# Patient Record
Sex: FEMALE | Race: ASIAN | NOT HISPANIC OR LATINO | Employment: OTHER | URBAN - METROPOLITAN AREA
[De-identification: names, ages, dates, MRNs, and addresses within clinical notes are randomized per-mention and may not be internally consistent; named-entity substitution may affect disease eponyms.]

---

## 2017-03-29 ENCOUNTER — OFFICE VISIT (OUTPATIENT)
Dept: PODIATRY | Facility: CLINIC | Age: 72
End: 2017-03-29
Payer: MEDICARE

## 2017-03-29 VITALS
HEIGHT: 62 IN | WEIGHT: 150 LBS | SYSTOLIC BLOOD PRESSURE: 125 MMHG | BODY MASS INDEX: 27.6 KG/M2 | HEART RATE: 71 BPM | DIASTOLIC BLOOD PRESSURE: 71 MMHG

## 2017-03-29 DIAGNOSIS — S90.821A BLISTER OF RIGHT FOOT WITHOUT INFECTION, INITIAL ENCOUNTER: Primary | ICD-10-CM

## 2017-03-29 DIAGNOSIS — M79.671 RIGHT FOOT PAIN: ICD-10-CM

## 2017-03-29 PROCEDURE — 1157F ADVNC CARE PLAN IN RCRD: CPT | Mod: S$GLB,,, | Performed by: PODIATRIST

## 2017-03-29 PROCEDURE — 1125F AMNT PAIN NOTED PAIN PRSNT: CPT | Mod: S$GLB,,, | Performed by: PODIATRIST

## 2017-03-29 PROCEDURE — 1160F RVW MEDS BY RX/DR IN RCRD: CPT | Mod: S$GLB,,, | Performed by: PODIATRIST

## 2017-03-29 PROCEDURE — 99999 PR PBB SHADOW E&M-EST. PATIENT-LVL III: CPT | Mod: PBBFAC,,, | Performed by: PODIATRIST

## 2017-03-29 PROCEDURE — 1159F MED LIST DOCD IN RCRD: CPT | Mod: S$GLB,,, | Performed by: PODIATRIST

## 2017-03-29 PROCEDURE — 99213 OFFICE O/P EST LOW 20 MIN: CPT | Mod: S$GLB,,, | Performed by: PODIATRIST

## 2017-03-29 RX ORDER — SILVER SULFADIAZINE 10 G/1000G
CREAM TOPICAL DAILY
Qty: 20 G | Refills: 1 | Status: SHIPPED | OUTPATIENT
Start: 2017-03-29 | End: 2019-09-20

## 2017-03-29 RX ORDER — MELOXICAM 15 MG/1
TABLET ORAL
Qty: 90 TABLET | Refills: 1 | Status: SHIPPED | OUTPATIENT
Start: 2017-03-29 | End: 2018-09-28

## 2017-03-29 RX ORDER — MELOXICAM 15 MG/1
15 TABLET ORAL DAILY PRN
Qty: 30 TABLET | Refills: 1 | Status: SHIPPED | OUTPATIENT
Start: 2017-03-29 | End: 2017-03-29 | Stop reason: SDUPTHER

## 2017-03-29 NOTE — PROGRESS NOTES
"  HPI:  Celestine Diallo is a 71 y.o. female who presents with complains of right hallux pain for at least two weeks, she had developed a blister in the area.  The blister opened last night, relieving some of her pain.  She has been treating the area by using heat from an incense for pain relief, as she had done on the left hallux in the past.  She is also applying silvadene to the area and requests a prescription refill.      History of ganglion cyst excision left hallux DOS:   12-   No new changes in shoes or activities as reported by the patient.         No past medical history on file.       Current Outpatient Prescriptions on File Prior to Visit   Medication Sig Dispense Refill    diclofenac sodium (VOLTAREN) 1 % Gel Apply 2 g topically 4 (four) times daily as needed. 100 g 1    FLUZONE HIGH-DOSE 2014-15, PF, 180 mcg/0.5 mL Syrg       hydrocodone-acetaminophen 5-325mg (NORCO) 5-325 mg per tablet Take 1 tablet by mouth every 6 (six) hours as needed for Pain. 20 tablet 0    levothyroxine (SYNTHROID) 25 MCG tablet Take 25 mcg by mouth once daily.       No current facility-administered medications on file prior to visit.          Review of patient's allergies indicates:  No Known Allergies        O:  Vitals:    03/29/17 0721   BP: 125/71   Pulse: 71   Weight: 68 kg (150 lb)   Height: 5' 2" (1.575 m)      Neurovascular status intact.   No gross foot deformity  right hallux epidermal wound at the lateral aspect with raw granular base, some clear exudate but no open wounds, approx 2cm x 3cm in area extending in to the plantar sulcus.       A:  1. Blister of right foot without infection, initial encounter  silver sulfADIAZINE 1% (SILVADENE) 1 % cream   2. Right foot pain          P:  I counseled the patient on her conditions, their implications and medical management.   Blister irrigated with sterile normal saline.  Topical antibiotics applied.  The toe was wrapped with gauze.  Patient will clean and change " daily.   Monitor for worsening signs and symptoms.    Patient states that she will call for a follow up if she needs it.

## 2018-09-28 ENCOUNTER — HOSPITAL ENCOUNTER (OUTPATIENT)
Dept: RADIOLOGY | Facility: HOSPITAL | Age: 73
Discharge: HOME | End: 2018-09-28
Attending: PHYSICIAN ASSISTANT
Payer: MEDICARE

## 2018-09-28 ENCOUNTER — OFFICE VISIT (OUTPATIENT)
Dept: ORTHOPEDICS | Facility: CLINIC | Age: 73
End: 2018-09-28
Payer: MEDICARE

## 2018-09-28 VITALS
SYSTOLIC BLOOD PRESSURE: 128 MMHG | DIASTOLIC BLOOD PRESSURE: 77 MMHG | WEIGHT: 149.94 LBS | HEART RATE: 69 BPM | BODY MASS INDEX: 27.59 KG/M2 | HEIGHT: 62 IN

## 2018-09-28 DIAGNOSIS — M25.561 PAIN IN BOTH KNEES, UNSPECIFIED CHRONICITY: ICD-10-CM

## 2018-09-28 DIAGNOSIS — M17.0 PRIMARY OSTEOARTHRITIS OF BOTH KNEES: Primary | ICD-10-CM

## 2018-09-28 DIAGNOSIS — M25.561 PAIN IN BOTH KNEES, UNSPECIFIED CHRONICITY: Primary | ICD-10-CM

## 2018-09-28 DIAGNOSIS — M25.562 PAIN IN BOTH KNEES, UNSPECIFIED CHRONICITY: ICD-10-CM

## 2018-09-28 DIAGNOSIS — M25.562 PAIN IN BOTH KNEES, UNSPECIFIED CHRONICITY: Primary | ICD-10-CM

## 2018-09-28 PROCEDURE — 99203 OFFICE O/P NEW LOW 30 MIN: CPT | Mod: 25,S$PBB,, | Performed by: PHYSICIAN ASSISTANT

## 2018-09-28 PROCEDURE — 99213 OFFICE O/P EST LOW 20 MIN: CPT | Mod: PBBFAC,25 | Performed by: PHYSICIAN ASSISTANT

## 2018-09-28 PROCEDURE — 20610 DRAIN/INJ JOINT/BURSA W/O US: CPT | Mod: PBBFAC | Performed by: PHYSICIAN ASSISTANT

## 2018-09-28 PROCEDURE — 99999 PR PBB SHADOW E&M-EST. PATIENT-LVL III: CPT | Mod: PBBFAC,,, | Performed by: PHYSICIAN ASSISTANT

## 2018-09-28 PROCEDURE — 20610 DRAIN/INJ JOINT/BURSA W/O US: CPT | Mod: S$PBB,50,, | Performed by: PHYSICIAN ASSISTANT

## 2018-09-28 PROCEDURE — 1101F PT FALLS ASSESS-DOCD LE1/YR: CPT | Mod: CPTII,,, | Performed by: PHYSICIAN ASSISTANT

## 2018-09-28 PROCEDURE — 73564 X-RAY EXAM KNEE 4 OR MORE: CPT | Mod: 26,50,, | Performed by: RADIOLOGY

## 2018-09-28 PROCEDURE — 73564 X-RAY EXAM KNEE 4 OR MORE: CPT | Mod: TC,50

## 2018-09-28 RX ORDER — TRIAMCINOLONE ACETONIDE 40 MG/ML
80 INJECTION, SUSPENSION INTRA-ARTICULAR; INTRAMUSCULAR
Status: COMPLETED | OUTPATIENT
Start: 2018-09-28 | End: 2018-09-28

## 2018-09-28 RX ORDER — DICLOFENAC SODIUM 10 MG/G
2 GEL TOPICAL 2 TIMES DAILY
Qty: 100 G | Refills: 1 | Status: ON HOLD | OUTPATIENT
Start: 2018-09-28 | End: 2019-09-30 | Stop reason: HOSPADM

## 2018-09-28 RX ADMIN — TRIAMCINOLONE ACETONIDE 80 MG: 40 INJECTION, SUSPENSION INTRA-ARTICULAR; INTRAMUSCULAR at 09:09

## 2018-09-28 NOTE — PROGRESS NOTES
"  SUBJECTIVE:     Chief Complaint : bilateral knee pain    History of Present Illness:  Celestine Diallo is a 72 y.o. female seen in clinic today with a chief complaint of chronic atraumatic bilateral knee pain. Pain started about 10 years ago but has increased significantly over the past month. No change in activity. Pt has fallen several times in her home because of her knees. She has stiffness after sitting. She has difficulty getting out of a chair. Left knee is worse than right. She has lateral pain. Denies swelling. Has chronic pain in feet and is treated by podiatry. She had knee injections 7 years ago in [place] that helped.  She takes glucosamine for her knees and uses topical treatment but does not take any pills. She does stretching and exercises in bed each day before getting up. This makes her feel better.    PMH:   S/p thyroidectomy    Review of Systems:  Constitutional: no fever or chills  ENT: no nasal congestion or sore throat  Respiratory: no cough or shortness of breath  Cardiovascular: no chest pain or palpitations  Gastrointestinal: no nausea or vomiting, tolerating diet  Genitourinary: no hematuria or dysuria  Integument/Breast: no rash or pruritis  Hematologic/Lymphatic: no easy bruising or lymphadenopathy  Musculoskeletal: see HPI  Neurological: no seizures or tremors  Behavioral/Psych: no auditory or visual hallucinations    OBJECTIVE:     PHYSICAL EXAM:  Blood pressure 128/77, pulse 69, height 5' 2" (1.575 m), weight 68 kg (149 lb 14.6 oz).   General Appearance: WDWN, NAD  Gait: antalgic  Neuro/Psych: Mood & affect appropriate  Lungs: Respirations equal and unlabored.   CV: 2+ bilateral upper and lower extremity pulses.   Skin: Intact throughout LE  Extremities: No LE edema    Right Knee Exam  Range of Motion:0-120 active   Effusion:none  Condition of skin:intact  Location of tenderness:Lateral joint line   Strength:4 of 5 quadriceps strength and 4 of 5 hamstring strength  Stability:stable to " testing  Brandin: negative/negative    Left Knee Exam  Range of Motion:5-115 active   Effusion:none  Condition of skin:intact  Location of tenderness:Lateral joint line   Strength:4 of 5 quadriceps strength and 4 of 5 hamstring strength  Stability:stable to testing  Brandin: negative/negative    Alignment: Moderate valgus    Right Hip Examination: no pain with PROM     Left Hip Examination: no pain with PROM     RADIOGRAPHS: AP, lateral and merchant knee x-rays ordered and images reviewed today by me reveal advanced degenerative changes of both knees. Complete loss of joint space in lateral compartments that is worse in flexion.    ASSESSMENT/PLAN:   Primary osteoarthritis of both knees  - Continue exercises, glucosamine  - Refilled topical diclofenac  - Encouraged use of cane to prevent falls   - Discussed that TKA may be the only way to get rid of pain. She does not want surgery.  - Pt would like to have bilateral CSI today.   - Gave Euflexxa brochure. She will call if she would like to try.  - F/u prn.    Knee Injection Procedure Note  Diagnosis: bilateral knee degenerative arthritis  Indications: bilateral knee pain  Procedure Details: Verbal consent was obtained for the procedure. The injection site was identified and the skin was prepared with alcohol. The bilateral knee was injected from an anterolateral approach with 1 ml of Kenalog and 2.5 ml Lidocaine under sterile technique using a 22 gauge needle. The needle was removed and the area cleansed and dressed.  Complications:  Patient tolerated the procedure well.    she was advised to rest the knee today, using ice and elevation as needed for comfort and swelling.Immediate relief of the knee pain may be short lived and secondary to the lidocaine. she may have an increase in discomfort tonight followed by steady improvement over the next several days. It may take 1-2 weeks following the injection to get the full benefit of the medication.

## 2019-07-02 ENCOUNTER — OFFICE VISIT (OUTPATIENT)
Dept: ORTHOPEDICS | Facility: CLINIC | Age: 74
End: 2019-07-02
Payer: MEDICARE

## 2019-07-02 ENCOUNTER — HOSPITAL ENCOUNTER (OUTPATIENT)
Dept: RADIOLOGY | Facility: HOSPITAL | Age: 74
Discharge: HOME | End: 2019-07-02
Attending: PHYSICIAN ASSISTANT
Payer: MEDICARE

## 2019-07-02 VITALS — HEIGHT: 62 IN | WEIGHT: 149.94 LBS | BODY MASS INDEX: 27.59 KG/M2

## 2019-07-02 DIAGNOSIS — M25.562 PAIN IN BOTH KNEES, UNSPECIFIED CHRONICITY: Primary | ICD-10-CM

## 2019-07-02 DIAGNOSIS — M25.561 PAIN IN BOTH KNEES, UNSPECIFIED CHRONICITY: Primary | ICD-10-CM

## 2019-07-02 DIAGNOSIS — M17.0 PRIMARY OSTEOARTHRITIS OF BOTH KNEES: Primary | ICD-10-CM

## 2019-07-02 DIAGNOSIS — M25.561 PAIN IN BOTH KNEES, UNSPECIFIED CHRONICITY: ICD-10-CM

## 2019-07-02 DIAGNOSIS — M25.562 PAIN IN BOTH KNEES, UNSPECIFIED CHRONICITY: ICD-10-CM

## 2019-07-02 PROCEDURE — 99999 PR PBB SHADOW E&M-EST. PATIENT-LVL III: ICD-10-PCS | Mod: PBBFAC,,, | Performed by: PHYSICIAN ASSISTANT

## 2019-07-02 PROCEDURE — 99499 UNLISTED E&M SERVICE: CPT | Mod: S$GLB,,, | Performed by: PHYSICIAN ASSISTANT

## 2019-07-02 PROCEDURE — 20610 PR DRAIN/INJECT LARGE JOINT/BURSA: ICD-10-PCS | Mod: 50,S$GLB,, | Performed by: PHYSICIAN ASSISTANT

## 2019-07-02 PROCEDURE — 20610 DRAIN/INJ JOINT/BURSA W/O US: CPT | Mod: 50,S$GLB,, | Performed by: PHYSICIAN ASSISTANT

## 2019-07-02 PROCEDURE — 73564 X-RAY EXAM KNEE 4 OR MORE: CPT | Mod: TC,50

## 2019-07-02 PROCEDURE — 1101F PR PT FALLS ASSESS DOC 0-1 FALLS W/OUT INJ PAST YR: ICD-10-PCS | Mod: CPTII,S$GLB,, | Performed by: PHYSICIAN ASSISTANT

## 2019-07-02 PROCEDURE — 99999 PR PBB SHADOW E&M-EST. PATIENT-LVL III: CPT | Mod: PBBFAC,,, | Performed by: PHYSICIAN ASSISTANT

## 2019-07-02 PROCEDURE — 1101F PT FALLS ASSESS-DOCD LE1/YR: CPT | Mod: CPTII,S$GLB,, | Performed by: PHYSICIAN ASSISTANT

## 2019-07-02 PROCEDURE — 73564 X-RAY EXAM KNEE 4 OR MORE: CPT | Mod: 26,50,, | Performed by: RADIOLOGY

## 2019-07-02 PROCEDURE — 99499 NO LOS: ICD-10-PCS | Mod: S$GLB,,, | Performed by: PHYSICIAN ASSISTANT

## 2019-07-02 PROCEDURE — 73564 XR KNEE ORTHO BILAT WITH FLEXION: ICD-10-PCS | Mod: 26,50,, | Performed by: RADIOLOGY

## 2019-07-02 RX ORDER — ALENDRONATE SODIUM 70 MG/1
TABLET ORAL
Refills: 3 | COMMUNITY
Start: 2019-04-12 | End: 2019-09-20

## 2019-07-02 NOTE — PROGRESS NOTES
"I spoke to the patient's daughter Esa on the phone today during the visit and answered all questions.     SUBJECTIVE:     Chief Complaint : bilateral knee pain    History of Present Illness:  Celestine Diallo is a 73 y.o. female seen in clinic today with a chief complaint of chronic atraumatic bilateral knee pain. Pain started about 10 years ago but has increased significantly over the past year. No change in activity. Pt has fallen several times in her home because of her knees. She has stiffness after sitting. She has difficulty getting out of a chair. Left knee is worse than right. She has lateral pain. Denies swelling. Has chronic pain in feet and is treated by podiatry. She had bilateral knee injections in September of 2018 by me that relieved pain for 2 months.  She takes glucosamine for her knees and uses topical treatment but does not take any pills. She does stretching and exercises in bed each day before getting up. This makes her feel better.      PMH:   S/p thyroidectomy    Review of Systems:  Constitutional: no fever or chills  ENT: no nasal congestion or sore throat  Respiratory: no cough or shortness of breath  Cardiovascular: no chest pain or palpitations  Gastrointestinal: no nausea or vomiting, tolerating diet  Genitourinary: no hematuria or dysuria  Integument/Breast: no rash or pruritis  Hematologic/Lymphatic: no easy bruising or lymphadenopathy  Musculoskeletal: see HPI  Neurological: no seizures or tremors  Behavioral/Psych: no auditory or visual hallucinations    OBJECTIVE:     PHYSICAL EXAM:  Height 5' 2" (1.575 m), weight 68 kg (149 lb 14.6 oz).   General Appearance: WDWN, NAD  Gait: antalgic  Neuro/Psych: Mood & affect appropriate  Lungs: Respirations equal and unlabored.   CV: 2+ bilateral upper and lower extremity pulses.   Skin: Intact throughout LE  Extremities: No LE edema    Right Knee Exam  Range of Motion:0-120 active   Effusion:none  Condition of skin:intact  Location of " tenderness:Lateral joint line   Strength:4 of 5 quadriceps strength and 4 of 5 hamstring strength  Stability:stable to testing  Brandin: negative/negative    Left Knee Exam  Range of Motion:5-115 active   Effusion:none  Condition of skin:intact  Location of tenderness:Lateral joint line   Strength:4 of 5 quadriceps strength and 4 of 5 hamstring strength  Stability:stable to testing  Brandin: negative/negative    Alignment: Moderate valgus    Right Hip Examination: no pain with PROM     Left Hip Examination: no pain with PROM     RADIOGRAPHS: AP, lateral and merchant knee x-rays ordered and images reviewed today by me reveal advanced degenerative changes of both knees. Complete loss of joint space in lateral compartments that is worse in flexion.    ASSESSMENT/PLAN:   Primary osteoarthritis of both knees  - Continue exercises, glucosamine, topical NSAID  - Encouraged use of cane to prevent falls. Pt declined.  - Discussed that TKA may be the only way to get rid of pain. She is now willing to discuss surgical options but would like to try Synvisc first.   - Bilateral Synvisc injections given today. She will f/u next week for second injection.    Procedure Note:   Exam demonstrates the no effusion in the bilateral knee, and the skin is intact.  Diagnosis: osteoarthritis knee  After time out was performed and patient ID, side, and site were verified, the right knee and the left knee were sterilly prepped in the standard fashion.  A 22-gauge needle was introduced into the right knee and the left knee joint from an jolanta-lateral site without complication. The right knee and the left knee were then injected with 2 ml of Synvisc each. Sterile dressing was applied.  The patient was instructed to resume activities as tolerated and to call with any problems.

## 2019-07-09 ENCOUNTER — OFFICE VISIT (OUTPATIENT)
Dept: ORTHOPEDICS | Facility: CLINIC | Age: 74
End: 2019-07-09
Payer: MEDICARE

## 2019-07-09 VITALS
DIASTOLIC BLOOD PRESSURE: 64 MMHG | HEIGHT: 62 IN | SYSTOLIC BLOOD PRESSURE: 116 MMHG | WEIGHT: 149.94 LBS | TEMPERATURE: 98 F | BODY MASS INDEX: 27.59 KG/M2 | HEART RATE: 68 BPM

## 2019-07-09 DIAGNOSIS — M17.0 PRIMARY OSTEOARTHRITIS OF BOTH KNEES: Primary | ICD-10-CM

## 2019-07-09 PROCEDURE — 99499 NO LOS: ICD-10-PCS | Mod: S$GLB,,, | Performed by: PHYSICIAN ASSISTANT

## 2019-07-09 PROCEDURE — 20610 PR DRAIN/INJECT LARGE JOINT/BURSA: ICD-10-PCS | Mod: 50,S$GLB,, | Performed by: PHYSICIAN ASSISTANT

## 2019-07-09 PROCEDURE — 99999 PR PBB SHADOW E&M-EST. PATIENT-LVL III: ICD-10-PCS | Mod: PBBFAC,,, | Performed by: PHYSICIAN ASSISTANT

## 2019-07-09 PROCEDURE — 20610 DRAIN/INJ JOINT/BURSA W/O US: CPT | Mod: 50,S$GLB,, | Performed by: PHYSICIAN ASSISTANT

## 2019-07-09 PROCEDURE — 99499 UNLISTED E&M SERVICE: CPT | Mod: S$GLB,,, | Performed by: PHYSICIAN ASSISTANT

## 2019-07-09 PROCEDURE — 99999 PR PBB SHADOW E&M-EST. PATIENT-LVL III: CPT | Mod: PBBFAC,,, | Performed by: PHYSICIAN ASSISTANT

## 2019-07-10 NOTE — PROGRESS NOTES
Celestine Diallo presents to clinic today for the second bilateral knee Synvisc injection.    Exam demonstrates the no effusion in the bilateral knee, and the skin is intact.    Diagnosis: osteoarthritis knee    After time out was performed and patient ID, side, and site were verified, the right knee and the left knee were sterilly prepped in the standard fashion.  A 22-gauge needle was introduced into the right knee and the left knee joint from an jolanta-lateral site without complication. The right knee and the left knee were then injected with 2 ml of Synvisc each. Sterile dressing was applied.  The patient was instructed to resume activities as tolerated and to call with any problems.     We will see Celestine Diallo back next week for the third injection.

## 2019-07-16 ENCOUNTER — OFFICE VISIT (OUTPATIENT)
Dept: ORTHOPEDICS | Facility: CLINIC | Age: 74
End: 2019-07-16
Payer: MEDICARE

## 2019-07-16 VITALS
WEIGHT: 149.94 LBS | DIASTOLIC BLOOD PRESSURE: 70 MMHG | BODY MASS INDEX: 27.59 KG/M2 | HEIGHT: 62 IN | SYSTOLIC BLOOD PRESSURE: 117 MMHG | HEART RATE: 64 BPM

## 2019-07-16 DIAGNOSIS — M17.0 PRIMARY OSTEOARTHRITIS OF BOTH KNEES: Primary | ICD-10-CM

## 2019-07-16 PROCEDURE — 99499 UNLISTED E&M SERVICE: CPT | Mod: S$GLB,,, | Performed by: PHYSICIAN ASSISTANT

## 2019-07-16 PROCEDURE — 99999 PR PBB SHADOW E&M-EST. PATIENT-LVL III: ICD-10-PCS | Mod: PBBFAC,,, | Performed by: PHYSICIAN ASSISTANT

## 2019-07-16 PROCEDURE — 20610 PR DRAIN/INJECT LARGE JOINT/BURSA: ICD-10-PCS | Mod: 50,S$GLB,, | Performed by: PHYSICIAN ASSISTANT

## 2019-07-16 PROCEDURE — 20610 DRAIN/INJ JOINT/BURSA W/O US: CPT | Mod: 50,S$GLB,, | Performed by: PHYSICIAN ASSISTANT

## 2019-07-16 PROCEDURE — 99499 NO LOS: ICD-10-PCS | Mod: S$GLB,,, | Performed by: PHYSICIAN ASSISTANT

## 2019-07-16 PROCEDURE — 99999 PR PBB SHADOW E&M-EST. PATIENT-LVL III: CPT | Mod: PBBFAC,,, | Performed by: PHYSICIAN ASSISTANT

## 2019-07-16 NOTE — PROGRESS NOTES
Celestine Diallo presents to clinic today for the third bilateral knee Synvisc injection.    Exam demonstrates the no effusion in the bilateral knee, and the skin is intact.    Diagnosis: osteoarthritis knee    After time out was performed and patient ID, side, and site were verified, the right knee and the left knee were sterilly prepped in the standard fashion.  A 22-gauge needle was introduced into the right knee and the left knee joint from an jolanta-lateral site without complication. The right knee and the left knee were then injected with 2 ml of Synvisc each. Sterile dressing was applied.  The patient was instructed to resume activities as tolerated and to call with any problems.     F/u prn.

## 2019-08-30 ENCOUNTER — TELEPHONE (OUTPATIENT)
Dept: ORTHOPEDICS | Facility: CLINIC | Age: 74
End: 2019-08-30

## 2019-08-30 ENCOUNTER — OFFICE VISIT (OUTPATIENT)
Dept: ORTHOPEDICS | Facility: CLINIC | Age: 74
End: 2019-08-30
Payer: MEDICARE

## 2019-08-30 VITALS — BODY MASS INDEX: 25.98 KG/M2 | WEIGHT: 141.19 LBS | HEIGHT: 62 IN

## 2019-08-30 DIAGNOSIS — M17.0 PRIMARY OSTEOARTHRITIS OF BOTH KNEES: Primary | ICD-10-CM

## 2019-08-30 PROCEDURE — 99999 PR PBB SHADOW E&M-EST. PATIENT-LVL III: ICD-10-PCS | Mod: PBBFAC,,, | Performed by: PHYSICIAN ASSISTANT

## 2019-08-30 PROCEDURE — 1101F PT FALLS ASSESS-DOCD LE1/YR: CPT | Mod: CPTII,S$GLB,, | Performed by: PHYSICIAN ASSISTANT

## 2019-08-30 PROCEDURE — 99212 OFFICE O/P EST SF 10 MIN: CPT | Mod: S$GLB,,, | Performed by: PHYSICIAN ASSISTANT

## 2019-08-30 PROCEDURE — 1101F PR PT FALLS ASSESS DOC 0-1 FALLS W/OUT INJ PAST YR: ICD-10-PCS | Mod: CPTII,S$GLB,, | Performed by: PHYSICIAN ASSISTANT

## 2019-08-30 PROCEDURE — 99999 PR PBB SHADOW E&M-EST. PATIENT-LVL III: CPT | Mod: PBBFAC,,, | Performed by: PHYSICIAN ASSISTANT

## 2019-08-30 PROCEDURE — 99212 PR OFFICE/OUTPT VISIT, EST, LEVL II, 10-19 MIN: ICD-10-PCS | Mod: S$GLB,,, | Performed by: PHYSICIAN ASSISTANT

## 2019-08-30 RX ORDER — NAPROXEN SODIUM 220 MG
1 CAPSULE ORAL
COMMUNITY
End: 2022-09-02

## 2019-08-30 NOTE — TELEPHONE ENCOUNTER
Pre Ms Theresa OZUNA I called Erasmo  Son of Mrs Diallo, with a Dr [facility] appt for Sept. 12 @ 9:40 am   He took it    appt slip sent

## 2019-08-30 NOTE — PROGRESS NOTES
"  SUBJECTIVE:     Chief Complaint : bilateral knee pain    History of Present Illness:  Celestine Diallo is a 73 y.o. female seen in clinic today with a chief complaint of chronic atraumatic bilateral knee pain. Pain started about 10 years ago but has increased significantly over the past several months. No change in activity. Pt has fallen several times in her home because of her knees, but is using a cane and has not fallen recently.  She has stiffness after sitting. She has difficulty getting out of a chair. Right knee is worse than left. She has lateral pain. Denies swelling. Has chronic pain in feet and is treated by podiatry. She has tried injections. I gave her bilateral CSI 9/28/2019 that helped minimally. She also completed bilateral Synvisc injections 7/2019 that also helped slightly. She takes glucosamine for her knees and uses topical treatment but does not take any oral pain medications. She does stretching and exercises in bed each day before getting up. This makes her feel better.  Her knee pain is starting to affect her mental health and she is unable to remain independent and perform ADL. She is ready to discuss surgery. She moved from [facility] to the  in [place] and has three children here that are very helpful.     PMH:   S/p thyroidectomy    Review of Systems:  Constitutional: no fever or chills  ENT: no nasal congestion or sore throat  Respiratory: no cough or shortness of breath  Cardiovascular: no chest pain or palpitations  Gastrointestinal: no nausea or vomiting, tolerating diet  Genitourinary: no hematuria or dysuria  Integument/Breast: no rash or pruritis  Hematologic/Lymphatic: no easy bruising or lymphadenopathy  Musculoskeletal: see HPI  Neurological: no seizures or tremors  Behavioral/Psych: no auditory or visual hallucinations    OBJECTIVE:     PHYSICAL EXAM:  Height 5' 2" (1.575 m), weight 64 kg (141 lb 3.3 oz).   General Appearance: WDWN, NAD  Gait: antalgic  Neuro/Psych: Mood & affect " appropriate  Lungs: Respirations equal and unlabored.   CV: 2+ bilateral upper and lower extremity pulses.   Skin: Intact throughout LE  Extremities: No LE edema    Right Knee Exam  Range of Motion:0-120 active   Effusion:none  Condition of skin:intact  Location of tenderness:Lateral joint line   Strength:4 of 5 quadriceps strength and 4 of 5 hamstring strength  Stability:stable to testing  Brandin: negative/negative    Left Knee Exam  Range of Motion:5-115 active   Effusion:none  Condition of skin:intact  Location of tenderness:Lateral joint line   Strength:4 of 5 quadriceps strength and 4 of 5 hamstring strength  Stability:stable to testing  Brandin: negative/negative    Alignment: Moderate valgus    Right Hip Examination: no pain with PROM     Left Hip Examination: no pain with PROM     RADIOGRAPHS: AP, lateral and merchant knee x-rays reviewed today by me reveal advanced degenerative changes of both knees. Complete loss of joint space in lateral compartments that is worse in flexion.    ASSESSMENT/PLAN:   Primary osteoarthritis of both knees  - Continue stretching  - Encouraged use of cane to prevent falls   - Discussed TKA at length with patient and her son Erasmo. She has decided to proceed and would like to do so ASAP. Will discuss with surgeons and get her an appt. If she had to choose one knee at this point it would be her right.

## 2019-09-12 ENCOUNTER — HOSPITAL ENCOUNTER (OUTPATIENT)
Dept: RADIOLOGY | Facility: HOSPITAL | Age: 74
Discharge: HOME | End: 2019-09-12
Attending: ORTHOPAEDIC SURGERY
Payer: MEDICARE

## 2019-09-12 ENCOUNTER — OFFICE VISIT (OUTPATIENT)
Dept: ORTHOPEDICS | Facility: CLINIC | Age: 74
End: 2019-09-12
Payer: MEDICARE

## 2019-09-12 VITALS — BODY MASS INDEX: 25.98 KG/M2 | WEIGHT: 141.19 LBS | HEIGHT: 62 IN

## 2019-09-12 DIAGNOSIS — M17.9 OSTEOARTHRITIS OF KNEE, UNSPECIFIED (CODE): Primary | ICD-10-CM

## 2019-09-12 DIAGNOSIS — M17.9 OSTEOARTHRITIS OF KNEE, UNSPECIFIED (CODE): ICD-10-CM

## 2019-09-12 PROCEDURE — 99214 OFFICE O/P EST MOD 30 MIN: CPT | Mod: S$GLB,,, | Performed by: ORTHOPAEDIC SURGERY

## 2019-09-12 PROCEDURE — 73560 X-RAY EXAM OF KNEE 1 OR 2: CPT | Mod: TC,RT

## 2019-09-12 PROCEDURE — 1101F PR PT FALLS ASSESS DOC 0-1 FALLS W/OUT INJ PAST YR: ICD-10-PCS | Mod: CPTII,S$GLB,, | Performed by: ORTHOPAEDIC SURGERY

## 2019-09-12 PROCEDURE — 73560 XR KNEE 1 OR 2 VIEW RIGHT: ICD-10-PCS | Mod: 26,RT,, | Performed by: RADIOLOGY

## 2019-09-12 PROCEDURE — 73560 X-RAY EXAM OF KNEE 1 OR 2: CPT | Mod: 26,RT,, | Performed by: RADIOLOGY

## 2019-09-12 PROCEDURE — 1101F PT FALLS ASSESS-DOCD LE1/YR: CPT | Mod: CPTII,S$GLB,, | Performed by: ORTHOPAEDIC SURGERY

## 2019-09-12 PROCEDURE — 99999 PR PBB SHADOW E&M-EST. PATIENT-LVL III: ICD-10-PCS | Mod: PBBFAC,,, | Performed by: ORTHOPAEDIC SURGERY

## 2019-09-12 PROCEDURE — 99214 PR OFFICE/OUTPT VISIT, EST, LEVL IV, 30-39 MIN: ICD-10-PCS | Mod: S$GLB,,, | Performed by: ORTHOPAEDIC SURGERY

## 2019-09-12 PROCEDURE — 99999 PR PBB SHADOW E&M-EST. PATIENT-LVL III: CPT | Mod: PBBFAC,,, | Performed by: ORTHOPAEDIC SURGERY

## 2019-09-12 NOTE — LETTER
September 12, 2019      Carolyn Cardenas PA-C  [address]  [facility] [address]           [facility]oped[facility]  [address]  [facility] [address]  Phone: [phone]          Patient: Celestine Diallo   MR Number: [id number]   YOB: 1945   Date of Visit: 9/12/2019       Dear Carolyn Cardenas:    Thank you for referring Celestine Diallo to me for evaluation. Attached you will find relevant portions of my assessment and plan of care.    If you have questions, please do not hesitate to call me. I look forward to following Celestine Diallo along with you.    Sincerely,    [facility] MD    Enclosure  CC:  No Recipients    If you would like to receive this communication electronically, please contact [email or web address] or [phone] to request more information on [employer] access.    For providers and/or their staff who would like to refer a patient to [facility], please contact us through our one-stop-shop provider referral line, [facility], at [phone].    If you feel you have received this communication in error or would no longer like to receive these types of communications, please e-mail [email or web address]

## 2019-09-12 NOTE — PROGRESS NOTES
HPI:    Celestine Diallo is a 73 y.o. female who is here today for evaluation of bilateral knee pain, right knee pain worse than the left. She reportedly has had pain in her right knee for about 10 years ago, it has dramatically worsened over the last few months. She had bilateral steroid injections recently, July 16. She has tried everything from ibuprofen to ice without improvement. The injections only brought relief for a few days. Patient is healthy, no previous medical problems.   Chief Complaint   Patient presents with    Left Knee - Pain    Right Knee - Pain   .     Duration: 10 years  Intensity: moderate  Character of pain: sharp  Location: She reports that the pain is predominately  lateral  Patient's pain increases with activity.  Pain is increased with weightbearing and interferes with activities of daily living.    She has tried conservative management including NSAIDS, injections, and activity modification without relief.    She has discussed options with her family and wishes to schedule TKA.     PMSFSH reviewed per clinic record       History reviewed. No pertinent past medical history.       Current Outpatient Medications:     alendronate (FOSAMAX) 70 MG tablet, , Disp: , Rfl: 3    multivitamin with minerals tablet, Take 1 tablet by mouth once daily., Disp: , Rfl:     diclofenac sodium (VOLTAREN) 1 % Gel, Apply 2 g topically 2 (two) times daily., Disp: 100 g, Rfl: 1    FLUZONE HIGH-DOSE 2014-15, PF, 180 mcg/0.5 mL Syrg, , Disp: , Rfl:     naproxen sodium (ALEVE) 220 mg Cap, Take 1 tablet by mouth., Disp: , Rfl:     silver sulfADIAZINE 1% (SILVADENE) 1 % cream, Apply topically once daily., Disp: 20 g, Rfl: 1     Review of patient's allergies indicates:  No Known Allergies     ROS  Constitutional: Negative for fever, Negative for weight loss  HENT Negative for congestion  Cardiovascular: Negative chest pain  Respiratory: Negative Shortness of breath  Heme: Negative excessive  "bleeding  Skin:NegativeItching, Negative breakdown  Musculoskeletal:Negative for back pain, Positive for joint pain, Negative muscle pain, Negative muscle weakness  Neurological: Negative for numbness and paresthesias   Psychiatric/Behavioral: Negative altered mental status, Negative for depression    Physical Exam:   Ht 5' 2" (1.575 m)   Wt 64 kg (141 lb 3.3 oz)   BMI 25.83 kg/m²   General appearance: This is a well-developed, Well nourished female No obvious acute distress.  Psychiatric: normal mood and affect;  pleasant and conversant; judgment and thought content normal  Gait is coordinated. Patient has antalgic gait to the right  Cardiovascular: There are no swelling or varicosities present.   Respiratory: normal respiratory effort   Lymphatic: no adenopathy   Neurologic: alert and oriented to person, place, and time   Deep Tendon Reflexes are normal;  Coordination and Balance: Normal   Musculoskeletal   Neck    ROM shows normal flexion and extension and lateral rotation    Palpation: Non-tender    Stability is normal    Strength is normal    Skin is normal without masses and lesions    Sensation is intact to light touch   Back    ROM showsnot examined flexion, extension and     rotation    Palpation shows no masses    Stability is normal    Strength to flexion and extension well maintained    Core strength is diminished    Skin shows no rashes or cafe au lait spots;     Sensation is intact to light touch  Right hip   Range of motion normal    Left Hip  Range of motionnormal    Right Knee  Swelling Mild  TendernessLateral joint line  Range of Motion: 3-110  Motion is painfulYes  Crepitance presentYes    Right Leg  Neurologic Intact  Pulses Intact    Left Knee: Swelling None  TendernessNone  Range of Motion: 0-110   Motion is painful Yes    Left Leg   Neurologic Intact  Pulses Intact    Radiograph: Show severe degenerative arthritis with subchondral sclerosis, periarticular osteophytes and narrowing of joint " space.  Angle: mild valgus    Physical therapy is contraindicated due to potential bone loss on this severe arthritic joint.    Assessment:  Knee arthritis right. will plan on Right TKA September 30. Patient is overall healthy. Discussed R/b/A of management and patient and son agree to proceed.      She will need to be cleared in our PreOp center.         .    She  has no past medical history on file. . We will have to take this into account. She  will be followed by the hospitalist service while in the hospital.       We have gone over the hospitalization and recovery with her as well.  This is typically around 2 weeks on a walker and transition to a cane after that.  She will have home health likely for 3-4 weeks and then transition to outpatient if necessary.  She is agreement with this plan of care and is ready to proceed.  I will see her back for clinical recheck at the 2-week postop shelly.  I will see her back for clinical recheck for any other questions or problems as needed and certainly for any other issues in the interim.    We have discussed risks of total knee replacement which include but are not limited to blood clots in the legs that can travel to the lungs (pulmonary embolism). Pulmonary embolism can cause shortness of breath, chest pain, and even shock. Other risks include urinary tract infection, nausea and vomiting (usually related to pain medication), chronic knee pain and stiffness, bleeding into the knee joint, nerve damage, blood vessel injury, and infection of the knee which can require re-operation. Furthermore, the risks of anesthesia include potential heart, lung, kidney, and liver damage.

## 2019-09-17 ENCOUNTER — TELEPHONE (OUTPATIENT)
Dept: PREADMISSION TESTING | Facility: HOSPITAL | Age: 74
End: 2019-09-17

## 2019-09-17 ENCOUNTER — ANESTHESIA EVENT (OUTPATIENT)
Dept: SURGERY | Facility: HOSPITAL | Age: 74
DRG: 470 | End: 2019-09-17
Payer: MEDICARE

## 2019-09-17 ENCOUNTER — LAB VISIT (OUTPATIENT)
Dept: LAB | Facility: HOSPITAL | Age: 74
End: 2019-09-17
Attending: ORTHOPAEDIC SURGERY
Payer: MEDICARE

## 2019-09-17 ENCOUNTER — OFFICE VISIT (OUTPATIENT)
Dept: ORTHOPEDICS | Facility: CLINIC | Age: 74
End: 2019-09-17
Payer: MEDICARE

## 2019-09-17 VITALS
SYSTOLIC BLOOD PRESSURE: 117 MMHG | DIASTOLIC BLOOD PRESSURE: 72 MMHG | BODY MASS INDEX: 26.33 KG/M2 | HEART RATE: 74 BPM | HEIGHT: 62 IN | WEIGHT: 143.06 LBS

## 2019-09-17 DIAGNOSIS — Z01.818 PRE-OP TESTING: ICD-10-CM

## 2019-09-17 DIAGNOSIS — M17.9 OSTEOARTHRITIS OF KNEE, UNSPECIFIED (CODE): ICD-10-CM

## 2019-09-17 DIAGNOSIS — M17.11 PRIMARY OSTEOARTHRITIS OF RIGHT KNEE: Primary | ICD-10-CM

## 2019-09-17 DIAGNOSIS — M79.606 PAIN OF LOWER EXTREMITY, UNSPECIFIED LATERALITY: Primary | ICD-10-CM

## 2019-09-17 LAB
CRP SERPL-MCNC: 0.7 MG/L (ref 0–8.2)
ERYTHROCYTE [SEDIMENTATION RATE] IN BLOOD BY WESTERGREN METHOD: 23 MM/HR (ref 0–36)

## 2019-09-17 PROCEDURE — 99499 UNLISTED E&M SERVICE: CPT | Mod: S$GLB,,, | Performed by: PHYSICIAN ASSISTANT

## 2019-09-17 PROCEDURE — 86140 C-REACTIVE PROTEIN: CPT

## 2019-09-17 PROCEDURE — 99499 NO LOS: ICD-10-PCS | Mod: S$GLB,,, | Performed by: PHYSICIAN ASSISTANT

## 2019-09-17 PROCEDURE — 99999 PR PBB SHADOW E&M-EST. PATIENT-LVL III: ICD-10-PCS | Mod: PBBFAC,,, | Performed by: PHYSICIAN ASSISTANT

## 2019-09-17 PROCEDURE — 85652 RBC SED RATE AUTOMATED: CPT

## 2019-09-17 PROCEDURE — 99999 PR PBB SHADOW E&M-EST. PATIENT-LVL III: CPT | Mod: PBBFAC,,, | Performed by: PHYSICIAN ASSISTANT

## 2019-09-17 PROCEDURE — 36415 COLL VENOUS BLD VENIPUNCTURE: CPT

## 2019-09-17 NOTE — TELEPHONE ENCOUNTER
----- Message from Irina Anthony LPN sent at 9/17/2019  2:17 PM CDT -----  Surgery-9/30/19  Pre-op-9/17/19    Patient needs cbc,bmp,pt/inr,poc and Opoc appt

## 2019-09-17 NOTE — PRE ADMISSION SCREENING
Anesthesia Assessment: Preoperative EQUATION    Planned Procedure: Procedure(s) (LRB):  ARTHROPLASTY, KNEE, TOTAL (Right)  Requested Anesthesia Type:Femoral Block  Surgeon: [facility]., MD  Service: Orthopedics  Known or anticipated Date of Surgery:9/30/2019    Surgeon notes: reviewed    Previous anesthesia records: EXCISION MASS LEFT FOOT 12/2014 MAC    Other important co-morbidities: (HYPOTHYROIDISM h/o THYROIDECTOMY)     Tests already available:  ECHO 2012, STRESS TEST 2012 IN CARE EVERYWHERE              Plan:    Testing:  Hematology Profile, BMP, PT/INR and EKG (age >65 yrs)   Pre-anesthesia  visit       Visit focus: possible regional anesthesia and/or nerve block      Consultation:IM Perioperative Hospitalist       Navigation: Tests Scheduled.              Consults scheduled.             Results will be tracked by Preop Clinic.

## 2019-09-17 NOTE — ANESTHESIA PREPROCEDURE EVALUATION
Anesthesia Assessment: Preoperative EQUATION     Planned Procedure: Procedure(s) (LRB):  ARTHROPLASTY, KNEE, TOTAL (Right)  Requested Anesthesia Type:Femoral Block  Surgeon: John L. [facility] Jr., MD  Service: Orthopedics  Known or anticipated Date of Surgery:9/30/2019     Surgeon notes: reviewed     Previous anesthesia records: EXCISION MASS LEFT FOOT 12/2014 MAC     Other important co-morbidities: (HYPOTHYROIDISM h/o THYROIDECTOMY)     Tests already available:  ECHO 2012, STRESS TEST 2012 IN CARE EVERYWHERE                                        Plan:    Testing:  Hematology Profile, BMP, PT/INR and EKG (age >65 yrs)   Pre-anesthesia  visit                                        Visit focus: possible regional anesthesia and/or nerve block                            Consultation:IM Perioperative Hospitalist                             Navigation: Tests Scheduled.                         Consults scheduled.                        Results will be tracked by Preop Clinic.                                 Electronically signed by Shanika Bynum RN at 9/17/2019  5:20 PM          Addendum 9/18/19 0928: phone contact-informed pt of EKG scheduled on 9/20/19. Questioned pt regarding h/o thyroidectomy-states 30 yrs ago, denies for reason of cancer and reports has been off of thyroid medication for last 2 yrs per PCP. (last TSH 2012 in Care Everywhere)/Shanika Bynum RN                                                                                                     09/17/2019  Celestine Diallo is a 73 y.o., female.    Anesthesia Evaluation    I have reviewed the Patient Summary Reports.     I have reviewed the Medications.     Review of Systems  Anesthesia Hx:  No problems with previous Anesthesia Denies Hx of Anesthetic complications  History of prior surgery of interest to airway management or planning: (thyroid) Denies Family Hx of Anesthesia complications.   Denies Personal Hx of Anesthesia complications.    Social:  No Alcohol Use, Non-Smoker    Hematology/Oncology:        Denies Current/Recent Cancer   EENT/Dental:   denies chronic allergic rhinitis  Denies Otitis Media   Cardiovascular:   Denies Hypertension.  Denies CAD.           Pulmonary:   Denies COPD.  Denies Asthma.  Denies Shortness of breath.    Renal/:   Denies Chronic Renal Disease.     Hepatic/GI:   Denies PUD. Denies GERD.    Musculoskeletal:   Arthritis (R knee)     Neurological:  Neurology Normal    Endocrine:  Endocrine Normal    Dermatological:  Skin Normal    Psych:  Psychiatric Normal           Physical Exam  General:  Well nourished    Airway/Jaw/Neck:  Airway Findings: Mouth Opening: Normal Tongue: Normal  General Airway Assessment: Adult  Mallampati: III  Improves to III with phonation.  TM Distance: Normal, at least 6 cm        Eyes/Ears/Nose:  EYES/EARS/NOSE FINDINGS: Normal   Dental:  Dental Findings: (partial dentures top L. )    Chest/Lungs:  Chest/Lungs Clear    Heart/Vascular:  Heart Findings: Normal      Skin:  Skin Findings: Normal    Mental Status:  Mental Status Findings:  Cooperative, Alert and Oriented       The patient was seen by Perioperative Internal Medicine physician Dr. Burnett on 9/20/19, please see recommendations.    Discharge Plan: To home with son (Erasmo: [phone])/    Anesthesia Plan  Type of Anesthesia, risks & benefits discussed:  Anesthesia Type:  general, regional  Patient's Preference: Neuraxial vs GA  Intra-op Monitoring Plan: standard ASA monitors  Intra-op Monitoring Plan Comments:   Post Op Pain Control Plan: per primary service following discharge from PACU, IV/PO Opioids PRN and multimodal analgesia  Post Op Pain Control Plan Comments:   Induction:   IV  Beta Blocker:  Patient is not currently on a Beta-Blocker (No further documentation required).       Informed Consent: Patient understands risks and agrees with Anesthesia plan.  Questions answered. Anesthesia consent signed with patient.  ASA  Score: 2     Day of Surgery Review of History & Physical:    H&P update referred to the surgeon.     Anesthesia Plan Notes: Discussed Risks/Benefits of anesthetic plan  PMH was reviewed and PE was performed  Will plan for neuraxial technique and convert to GA if needed        Ready For Surgery From Anesthesia Perspective.

## 2019-09-17 NOTE — PRE-PROCEDURE INSTRUCTIONS
Pt informed to hold multivitamins, diclofenac gel, and naproxen (Aleve) for one week prior to surgery. Pt verbalized understanding.  Chart review, triage plan initiated, appointments scheduled.

## 2019-09-18 ENCOUNTER — TELEPHONE (OUTPATIENT)
Dept: PREADMISSION TESTING | Facility: HOSPITAL | Age: 74
End: 2019-09-18

## 2019-09-18 DIAGNOSIS — M17.9 OSTEOARTHRITIS OF KNEE, UNSPECIFIED (CODE): Primary | ICD-10-CM

## 2019-09-18 DIAGNOSIS — Z01.818 PRE-OP TESTING: Primary | ICD-10-CM

## 2019-09-18 RX ORDER — OXYCODONE HYDROCHLORIDE 5 MG/1
15 TABLET ORAL
Status: CANCELLED | OUTPATIENT
Start: 2019-09-18

## 2019-09-18 RX ORDER — SODIUM CHLORIDE 9 MG/ML
INJECTION, SOLUTION INTRAVENOUS
Status: CANCELLED | OUTPATIENT
Start: 2019-09-18

## 2019-09-18 RX ORDER — MORPHINE SULFATE 10 MG/ML
2 INJECTION, SOLUTION INTRAMUSCULAR; INTRAVENOUS
Status: CANCELLED | OUTPATIENT
Start: 2019-09-18

## 2019-09-18 RX ORDER — LIDOCAINE HYDROCHLORIDE 10 MG/ML
1 INJECTION, SOLUTION EPIDURAL; INFILTRATION; INTRACAUDAL; PERINEURAL
Status: CANCELLED | OUTPATIENT
Start: 2019-09-18

## 2019-09-18 RX ORDER — ACETAMINOPHEN 10 MG/ML
1000 INJECTION, SOLUTION INTRAVENOUS ONCE
Status: CANCELLED | OUTPATIENT
Start: 2019-09-18 | End: 2019-09-18

## 2019-09-18 RX ORDER — PREGABALIN 25 MG/1
75 CAPSULE ORAL
Status: CANCELLED | OUTPATIENT
Start: 2019-09-18

## 2019-09-18 RX ORDER — OXYCODONE HYDROCHLORIDE 5 MG/1
5 TABLET ORAL
Status: CANCELLED | OUTPATIENT
Start: 2019-09-18

## 2019-09-18 RX ORDER — FAMOTIDINE 20 MG/1
20 TABLET, FILM COATED ORAL 2 TIMES DAILY
Status: CANCELLED | OUTPATIENT
Start: 2019-09-18

## 2019-09-18 RX ORDER — PREGABALIN 25 MG/1
75 CAPSULE ORAL NIGHTLY
Status: CANCELLED | OUTPATIENT
Start: 2019-09-18

## 2019-09-18 RX ORDER — ONDANSETRON 2 MG/ML
4 INJECTION INTRAMUSCULAR; INTRAVENOUS EVERY 8 HOURS PRN
Status: CANCELLED | OUTPATIENT
Start: 2019-09-18

## 2019-09-18 RX ORDER — CELECOXIB 100 MG/1
400 CAPSULE ORAL
Status: CANCELLED | OUTPATIENT
Start: 2019-09-18

## 2019-09-18 RX ORDER — FENTANYL CITRATE 50 UG/ML
25 INJECTION, SOLUTION INTRAMUSCULAR; INTRAVENOUS EVERY 5 MIN PRN
Status: CANCELLED | OUTPATIENT
Start: 2019-09-18

## 2019-09-18 RX ORDER — NALOXONE HCL 0.4 MG/ML
0.02 VIAL (ML) INJECTION
Status: CANCELLED | OUTPATIENT
Start: 2019-09-18

## 2019-09-18 RX ORDER — MIDAZOLAM HYDROCHLORIDE 1 MG/ML
1 INJECTION INTRAMUSCULAR; INTRAVENOUS EVERY 5 MIN PRN
Status: CANCELLED | OUTPATIENT
Start: 2019-09-18

## 2019-09-18 RX ORDER — ROPIVACAINE HYDROCHLORIDE 2 MG/ML
8 INJECTION, SOLUTION EPIDURAL; INFILTRATION; PERINEURAL CONTINUOUS
Status: CANCELLED | OUTPATIENT
Start: 2019-09-18

## 2019-09-18 RX ORDER — ACETAMINOPHEN 500 MG
1000 TABLET ORAL EVERY 6 HOURS
Status: CANCELLED | OUTPATIENT
Start: 2019-09-18 | End: 2019-09-20

## 2019-09-18 RX ORDER — SODIUM CHLORIDE 0.9 % (FLUSH) 0.9 %
10 SYRINGE (ML) INJECTION
Status: CANCELLED | OUTPATIENT
Start: 2019-09-18

## 2019-09-18 RX ORDER — RAMELTEON 8 MG/1
8 TABLET ORAL NIGHTLY PRN
Status: CANCELLED | OUTPATIENT
Start: 2019-09-18

## 2019-09-18 RX ORDER — MUPIROCIN 20 MG/G
1 OINTMENT TOPICAL 2 TIMES DAILY
Status: CANCELLED | OUTPATIENT
Start: 2019-09-18 | End: 2019-09-23

## 2019-09-18 RX ORDER — CELECOXIB 100 MG/1
200 CAPSULE ORAL DAILY
Status: CANCELLED | OUTPATIENT
Start: 2019-09-18

## 2019-09-18 RX ORDER — ASPIRIN 81 MG/1
81 TABLET ORAL 2 TIMES DAILY
Status: CANCELLED | OUTPATIENT
Start: 2019-09-18

## 2019-09-18 RX ORDER — POLYETHYLENE GLYCOL 3350 17 G/17G
17 POWDER, FOR SOLUTION ORAL DAILY
Status: CANCELLED | OUTPATIENT
Start: 2019-09-18

## 2019-09-18 RX ORDER — SODIUM CHLORIDE 9 MG/ML
INJECTION, SOLUTION INTRAVENOUS CONTINUOUS
Status: CANCELLED | OUTPATIENT
Start: 2019-09-18 | End: 2019-09-19

## 2019-09-18 RX ORDER — BISACODYL 10 MG
10 SUPPOSITORY, RECTAL RECTAL EVERY 12 HOURS PRN
Status: CANCELLED | OUTPATIENT
Start: 2019-09-18

## 2019-09-18 RX ORDER — AMOXICILLIN 250 MG
1 CAPSULE ORAL 2 TIMES DAILY
Status: CANCELLED | OUTPATIENT
Start: 2019-09-18

## 2019-09-18 RX ORDER — MUPIROCIN 20 MG/G
1 OINTMENT TOPICAL
Status: CANCELLED | OUTPATIENT
Start: 2019-09-18

## 2019-09-18 RX ORDER — OXYCODONE HYDROCHLORIDE 5 MG/1
10 TABLET ORAL
Status: CANCELLED | OUTPATIENT
Start: 2019-09-18

## 2019-09-18 NOTE — H&P
CC: Right knee pain    Celestine Diallo is a 73 y.o. female with 5 year history of Bilateral knee pain. Right is currently worse than left. Pain is worse with activity and weight bearing.  Patient has experienced interference of activities of daily living due to decreased range of motion and an increase in joint pain and swelling.  Patient has failed non-operative treatment including NSAIDs, corticosteroid injections, viscosupplement injections, and activity modification.  Celestine Diallo currently ambulates using a cane.    Relevant medical conditions of significance in perioperative period:  S/p partial thyroidectomy    History reviewed. No pertinent past medical history.    Past Surgical History:   Procedure Laterality Date    CYST REMOVAL Left 2012    foot    EXCISION-MASS LEFT FOOT Left 12/19/2014    [facility] F[facility]    THYROIDECTOMY  2005    THYROIDECTOMY, PARTIAL  1980       History reviewed. No pertinent family history.    Review of patient's allergies indicates:  No Known Allergies      Current Outpatient Medications:     multivitamin with minerals tablet, Take 1 tablet by mouth once daily., Disp: , Rfl:     naproxen sodium (ALEVE) 220 mg Cap, Take 1 tablet by mouth., Disp: , Rfl:     alendronate (FOSAMAX) 70 MG tablet, , Disp: , Rfl: 3    diclofenac sodium (VOLTAREN) 1 % Gel, Apply 2 g topically 2 (two) times daily., Disp: 100 g, Rfl: 1    FLUZONE HIGH-DOSE 2014-15, PF, 180 mcg/0.5 mL Syrg, , Disp: , Rfl:     silver sulfADIAZINE 1% (SILVADENE) 1 % cream, Apply topically once daily., Disp: 20 g, Rfl: 1    Review of Systems:  Constitutional: no fever or chills  Eyes: no visual changes  ENT: no nasal congestion or sore throat  Respiratory: no cough or shortness of breath  Cardiovascular: no chest pain or palpitations  Gastrointestinal: no nausea or vomiting, tolerating diet  Genitourinary: no hematuria or dysuria  Integument/Breast: no rash or  "pruritis  Hematologic/Lymphatic: no easy bruising or lymphadenopathy  Musculoskeletal: positive for knee pain  Neurological: no seizures or tremors  Behavioral/Psych: no auditory or visual hallucinations  Endocrine: no heat or cold intolerance    PE:  /72 (BP Location: Left arm, Patient Position: Sitting, BP Method: Medium (Automatic))   Pulse 74   Ht 5' 2" (1.575 m)   Wt 64.9 kg (143 lb 1.3 oz)   BMI 26.17 kg/m²   General: Pleasant, cooperative, NAD   Gait: antalgic  HEENT: NCAT, sclera nonicteric   Lungs: Respirations clear bilaterally; equal and unlabored.   CV: S1S2; 2+ bilateral upper and lower extremity pulses.   Skin: Intact throughout with no rashes, erythema, or lesions  Extremities: No LE edema,  no erythema or warmth of the skin in either lower extremity.    Right knee exam:  Knee Range of Motion:3-110 active, pain with passive range of motion  Effusion:none  Condition of skin:intact  Location of tenderness:Medial joint line and Lateral joint line   Strength:4 of 5 quadriceps strength and 5 of 5 hamstring strength  Stability: stable to testing    Hip Examination:full painless range of motion, without tenderness    Radiographs: Radiographs reveal advanced degenerative changes including subchondral cyst formation, subchondral sclerosis, osteophyte formation, joint space narrowing.     Knee Alignment:  Mild valgus     Diagnosis: osteoarthritis Right knee    Plan: Right total knee arthroplasty    Due to the serious nature of total joint infection and the high prevalence of community acquired MRSA, vancomycin will be used perioperatively.            "

## 2019-09-18 NOTE — PROGRESS NOTES
Celestine Diallo is a 73 y.o. year old here today for a pre-operative visit in preparation for a Right total knee arthroplasty to be performed by  Dr. [facility] on 9/30/2019.  she was last seen and treated in the clinic on 9/12/2019. she will be medically optimized by the pre op center. There has been no significant change in medical status since last visit. No fever, chills, malaise, or unexplained weight change.      Allergies, Medications, past medical and surgical history reviewed.    Focused examination performed.    Patient declined to see Dr. [facility] today in clinic. All questions answered. Patient encouraged to call with questions. Contact information given.     Pre, tripp, and post operative procedures and expectations discussed. Questions were answered. Celestine Diallo has been educated and is ready to proceed with surgery. Approximately 30 minutes was spent discussing surgical outcomes, plans, procedures pre, tripp, and post operative expections and care.  Surgical consent signed.    Celestine Diallo will contact us if there are any questions, concerns, or changes in medical status prior to surgery.

## 2019-09-18 NOTE — H&P (VIEW-ONLY)
CC: Right knee pain    Celestine Diallo is a 73 y.o. female with 5 year history of Bilateral knee pain. Right is currently worse than left. Pain is worse with activity and weight bearing.  Patient has experienced interference of activities of daily living due to decreased range of motion and an increase in joint pain and swelling.  Patient has failed non-operative treatment including NSAIDs, corticosteroid injections, viscosupplement injections, and activity modification.  Celestine Diallo currently ambulates using a cane.    Relevant medical conditions of significance in perioperative period:  S/p partial thyroidectomy    History reviewed. No pertinent past medical history.    Past Surgical History:   Procedure Laterality Date    CYST REMOVAL Left 2012    foot    EXCISION-MASS LEFT FOOT Left 12/19/2014    [facility]formed [facility] at [facility]ST FLR    THYROIDECTOMY  2005    THYROIDECTOMY, PARTIAL  1980       History reviewed. No pertinent family history.    Review of patient's allergies indicates:  No Known Allergies      Current Outpatient Medications:     multivitamin with minerals tablet, Take 1 tablet by mouth once daily., Disp: , Rfl:     naproxen sodium (ALEVE) 220 mg Cap, Take 1 tablet by mouth., Disp: , Rfl:     alendronate (FOSAMAX) 70 MG tablet, , Disp: , Rfl: 3    diclofenac sodium (VOLTAREN) 1 % Gel, Apply 2 g topically 2 (two) times daily., Disp: 100 g, Rfl: 1    FLUZONE HIGH-DOSE 2014-15, PF, 180 mcg/0.5 mL Syrg, , Disp: , Rfl:     silver sulfADIAZINE 1% (SILVADENE) 1 % cream, Apply topically once daily., Disp: 20 g, Rfl: 1    Review of Systems:  Constitutional: no fever or chills  Eyes: no visual changes  ENT: no nasal congestion or sore throat  Respiratory: no cough or shortness of breath  Cardiovascular: no chest pain or palpitations  Gastrointestinal: no nausea or vomiting, tolerating diet  Genitourinary: no hematuria or dysuria  Integument/Breast: no rash or  "pruritis  Hematologic/Lymphatic: no easy bruising or lymphadenopathy  Musculoskeletal: positive for knee pain  Neurological: no seizures or tremors  Behavioral/Psych: no auditory or visual hallucinations  Endocrine: no heat or cold intolerance    PE:  /72 (BP Location: Left arm, Patient Position: Sitting, BP Method: Medium (Automatic))   Pulse 74   Ht 5' 2" (1.575 m)   Wt 64.9 kg (143 lb 1.3 oz)   BMI 26.17 kg/m²   General: Pleasant, cooperative, NAD   Gait: antalgic  HEENT: NCAT, sclera nonicteric   Lungs: Respirations clear bilaterally; equal and unlabored.   CV: S1S2; 2+ bilateral upper and lower extremity pulses.   Skin: Intact throughout with no rashes, erythema, or lesions  Extremities: No LE edema,  no erythema or warmth of the skin in either lower extremity.    Right knee exam:  Knee Range of Motion:3-110 active, pain with passive range of motion  Effusion:none  Condition of skin:intact  Location of tenderness:Medial joint line and Lateral joint line   Strength:4 of 5 quadriceps strength and 5 of 5 hamstring strength  Stability: stable to testing    Hip Examination:full painless range of motion, without tenderness    Radiographs: Radiographs reveal advanced degenerative changes including subchondral cyst formation, subchondral sclerosis, osteophyte formation, joint space narrowing.     Knee Alignment:  Mild valgus     Diagnosis: osteoarthritis Right knee    Plan: Right total knee arthroplasty    Due to the serious nature of total joint infection and the high prevalence of community acquired MRSA, vancomycin will be used perioperatively.            "

## 2019-09-19 DIAGNOSIS — E03.9 HYPOTHYROIDISM (ACQUIRED): Primary | ICD-10-CM

## 2019-09-19 PROBLEM — Z90.89 H/O THYROIDECTOMY: Status: ACTIVE | Noted: 2019-09-19

## 2019-09-19 PROBLEM — E89.0 H/O THYROIDECTOMY: Status: ACTIVE | Noted: 2019-09-19

## 2019-09-19 PROBLEM — Z98.890 H/O THYROIDECTOMY: Status: ACTIVE | Noted: 2019-09-19

## 2019-09-19 NOTE — TELEPHONE ENCOUNTER
Spoke to patient to instruct her to come straight to the [facility] to see Dr. Burnett.  Labs can be done after.   Donell

## 2019-09-20 ENCOUNTER — HOSPITAL ENCOUNTER (OUTPATIENT)
Dept: CARDIOLOGY | Facility: CLINIC | Age: 74
Discharge: HOME | End: 2019-09-20
Payer: MEDICARE

## 2019-09-20 ENCOUNTER — HOSPITAL ENCOUNTER (OUTPATIENT)
Dept: PREADMISSION TESTING | Facility: HOSPITAL | Age: 74
Discharge: HOME | End: 2019-09-20
Attending: ANESTHESIOLOGY
Payer: MEDICARE

## 2019-09-20 ENCOUNTER — INITIAL CONSULT (OUTPATIENT)
Dept: INTERNAL MEDICINE | Facility: CLINIC | Age: 74
End: 2019-09-20
Payer: MEDICARE

## 2019-09-20 VITALS
RESPIRATION RATE: 16 BRPM | OXYGEN SATURATION: 98 % | WEIGHT: 140 LBS | BODY MASS INDEX: 25.61 KG/M2 | TEMPERATURE: 98 F | DIASTOLIC BLOOD PRESSURE: 57 MMHG | SYSTOLIC BLOOD PRESSURE: 109 MMHG | HEART RATE: 68 BPM

## 2019-09-20 DIAGNOSIS — E89.0 H/O THYROIDECTOMY: ICD-10-CM

## 2019-09-20 DIAGNOSIS — Z01.818 PREOP EXAMINATION: Primary | ICD-10-CM

## 2019-09-20 DIAGNOSIS — Z01.818 PRE-OP TESTING: ICD-10-CM

## 2019-09-20 DIAGNOSIS — I83.891 VARICOSE VEINS OF LEG WITH SWELLING, RIGHT: ICD-10-CM

## 2019-09-20 PROCEDURE — 1101F PT FALLS ASSESS-DOCD LE1/YR: CPT | Mod: CPTII,S$GLB,, | Performed by: HOSPITALIST

## 2019-09-20 PROCEDURE — 1101F PR PT FALLS ASSESS DOC 0-1 FALLS W/OUT INJ PAST YR: ICD-10-PCS | Mod: CPTII,S$GLB,, | Performed by: HOSPITALIST

## 2019-09-20 PROCEDURE — 99204 PR OFFICE/OUTPT VISIT, NEW, LEVL IV, 45-59 MIN: ICD-10-PCS | Mod: S$GLB,,, | Performed by: HOSPITALIST

## 2019-09-20 PROCEDURE — 93010 EKG 12-LEAD: ICD-10-PCS | Mod: S$GLB,,, | Performed by: INTERNAL MEDICINE

## 2019-09-20 PROCEDURE — 99204 OFFICE O/P NEW MOD 45 MIN: CPT | Mod: S$GLB,,, | Performed by: HOSPITALIST

## 2019-09-20 PROCEDURE — 93005 ELECTROCARDIOGRAM TRACING: CPT | Mod: S$GLB,,, | Performed by: ANESTHESIOLOGY

## 2019-09-20 PROCEDURE — 99999 PR PBB SHADOW E&M-EST. PATIENT-LVL III: CPT | Mod: PBBFAC,,, | Performed by: HOSPITALIST

## 2019-09-20 PROCEDURE — 99999 PR PBB SHADOW E&M-EST. PATIENT-LVL III: ICD-10-PCS | Mod: PBBFAC,,, | Performed by: HOSPITALIST

## 2019-09-20 PROCEDURE — 93005 EKG 12-LEAD: ICD-10-PCS | Mod: S$GLB,,, | Performed by: ANESTHESIOLOGY

## 2019-09-20 PROCEDURE — 93010 ELECTROCARDIOGRAM REPORT: CPT | Mod: S$GLB,,, | Performed by: INTERNAL MEDICINE

## 2019-09-20 NOTE — LETTER
September 20, 2019      Son EDDY Correa MD  [address]  [place] [address]           [address] - Pre Op Consult  [address] [facility] [address]  Phone: [phone]          Patient: Celestine Diallo   MR Number: [id number]   YOB: 1945   Date of Visit: 9/20/2019       Dear Elizabeth Consultation:    Thank you for referring Celestine Diallo to me for evaluation. Attached you will find relevant portions of my assessment and plan of care.    If you have questions, please do not hesitate to call me. I look forward to following Celestine Diallo along with you.    Sincerely,    Bella Burnett MD    Enclosure  CC:  [facility]., MD    If you would like to receive this communication electronically, please contact [email or web address] or [phone] to request more information on [employer] Link access.    For providers and/or their staff who would like to refer a patient to [facility], please contact us through our one-stop-shop provider referral line, [facility], at [phone].    If you feel you have received this communication in error or would no longer like to receive these types of communications, please e-mail [email or web address]

## 2019-09-20 NOTE — DISCHARGE INSTRUCTIONS
Your surgery has been scheduled for:__________________________________________    You should report to:  ____[facility], located on the [place] side of the first floor of the           [facility] ([phone])  ____[facility], located on the [facility] side of the            Second floor of the [facility] ([phone])  ____3rd Floor [facility] located on the [facility] side of the [facility] [phone]  Please Note   - Tell your doctor if you take Aspirin, products containing Aspirin, herbal medications  or blood thinners, such as Coumadin, Ticlid, or Plavix.  (Consult your provider regarding holding or stopping before surgery).  - Arrange for someone to drive you home following surgery.  You will not be allowed to leave the surgical facility alone or drive yourself home following sedation and anesthesia.  Before Surgery  - Stop taking all herbal medications 14days prior to surgery  - No Motrin/Advil (Ibuprofen) 7 days before surgery  - No Aleve (Naproxen) 7 days before surgery  - Stop Taking Asprin, products containing Asprin _____days before surgery  - Stop taking blood thinners_______days before surgery  - No Goody's/BC  Powder 7 days before surgery  - Refrain from drinking alcoholic beverages for 24hours before and after surgery  - Stop or limit smoking _________days before surgery  - You may take Tylenol for pain  Night before Surgery  - Do not eat or drink after midnight  - Take a shower or bath (shower is recommended).  Bathe with Hibiclens soap or an antibacterial soap from the neck down.  If not supplied by your surgeon, hibiclens soap will need to be purchased over the counter in pharmacy.  Rinse soap off thoroughly.  - Shampoo your hair with your regular shampoo  The Day of Surgery  - Take another bath or shower with hibiclens or any antibacterial soap, to reduce the chance of infection.  - Take heart  and blood pressure medications with a small sip of water, as advised by the perioperative team.  - Do not take fluid pills  - You may brush your teeth and rinse your mouth, but do not swall any additional water.   - Do not apply perfumes, powder, body lotions or deodorant on the day of surgery.  - Nail polish should be removed.  - Do not wear makeup or moisturizer  - Wear comfortable clothes, such as a button front shirt and loose fitting pants.  - Leave all jewelry, including body piercings, and valuables at home.    - Bring any devices you will neeed after surgery such as crutches or canes.  - If you have sleep apnea, please bring your CPAP machine  In the event that your physical condition changes including the onset of a cold or respiratory illness, or if you have to delay or cancel your surgery, please notify your surgeon.   Anesthesia: Regional Anesthesia    Youre scheduled for surgery. During surgery, youll receive medicine called anesthesia to keep you comfortable and pain-free. Your surgeon has decided that youll receive regional anesthesia. This sheet tells you what to expect with this type of anesthesia.  What is regional anesthesia?  Regional anesthesia numbs one region of your body. The anesthesia may be given around nerves or into veins in your arms, neck, or legs (nerve block or Missy block). Or it may be sent into the spinal fluid (spinal anesthesia) or into the space just outside the spinal fluid (epidural anesthesia). You may also be given sedatives to help you relax.  Nerve block or Lluveras block  A small area of the body, such as an arm or leg, can be numbed using a nerve block or Lluveras block.  · Nerve block. During a nerve block, your skin is numbed. A needle is then inserted near nerves that serve the area to be numbed. Anesthetic is sent through the needle.  · IV regional or Lluveras block. For this type of block, an IV line is put into a vein. The blood flow to the area to be numbed is blocked for  a short time. Anesthetic is sent through the IV.  Spinal anesthesia  Spinal anesthesia numbs your body from about the waist down.  · Anesthetic is injected into the spinal fluid. This is a substance that surrounds the spinal cord in your spinal column. The anesthetic blocks pain traveling from the body to the brain.  · To receive the anesthetic, your skin is numbed at the injection site on your back.  · A needle is then inserted into the spinal space. Anesthetic is sent into the spinal fluid through the needle.  Epidural anesthesia  Epidural anesthesia is most commonly used during childbirth and may also be used after surgical procedures of the chest, belly, and legs.  · Anesthetic is injected into the epidural space. This is just outside the dural sac which contains the spinal fluid.  · To receive the anesthetic, your skin is numbed at the injection site on your back.  · A needle is then inserted into the epidural space. Anesthetic is sent into the epidural space through the needle.  · A small flexible catheter may be attached to the needle and left in place. This allows for continuous injections or infusions of anesthetic.  Anesthesia tools and medicines that might be near you during your procedure  · Local anesthetic. This medicine is given through a needle numbs one region of your body.  · Electrocardiography leads (electrodes). These are used to record your heart rate and rhythm.  · Blood pressure cuff. A cuff is placed on your arm to keep track of your blood pressure.  · Pulse oximeter. This small clip is placed on the end of the finger. It measures your blood oxygen level.  · Sedatives. These medicines may be given through an IV. They help to relax you and keep you comfortable. You may stay awake or sleep lightly.  · Oxygen. You may be given oxygen through a facemask.  Risks and possible complications  Regional anesthesia carries some risks. These include:  · Nausea and  vomiting  · Headache  · Backache  · Decreased blood pressure  · Allergic reaction to the anesthetic  · Ongoing numbness (rare)  · Irregular heartbeat (rare)  · Cardiac arrest (rare)   Date Last Reviewed: 12/1/2016  © 8869-7671 [employer]. [address] [place] [address]. All rights reserved. This information is not intended as a substitute for professional medical care. Always follow your healthcare professional's instructions.

## 2019-09-20 NOTE — ASSESSMENT & PLAN NOTE
Had Thyroid surgery twice   Early 80's  Part of thyroid removed in [place]- Had goiter and was having problem with speaking , swallowing   Around 2004/2005 had second thyroid surgery -More of thyroid removal - for burping , for dysphagia , difficulty speaking   No longer having  difficulty speaking , swallowing   Seems to have had been on Thyroid replacement until 2 years ago   No longer taking Thyroid replacement   Under lab monitoring every 6 months  No overt hypo/ hyper thyroid symptoms     Older records indicate- Sub sternal Thyroid   -  9/20- 18 59     TSH mildly abnormal at 0.387   Free T4 - N  No suggestion of Hypothyroidism   Sub clinical hyperthyroidism

## 2019-09-20 NOTE — HPI
History of present illness- I had the pleasure of meeting this pleasant 73 y.o. lady in the pre op clinic prior to her elective Orthopedic surgery. The patient is new to me . Celestine was accompanied by son Erasmo .    I have obtained the history by speaking to the patient and by reviewing the electronic health records.    Events leading up to surgery / History of presenting illness -    She has been troubled with moderate-severe  Rt kne3 pain for 1 year  . Pain increases with Bending  and activity and decreases with resting.     Relevant health conditions of significance for the perioperative period/ History of presenting illness -    Subjectively describes health as good    Lives alone , independent   Son lives next door  Single level house- 2 steps to get in   Drives  Son going to help post op    Not known to have heart disease , Diabetes Mellitus, HTN, Lung disease

## 2019-09-20 NOTE — OUTPATIENT SUBJECTIVE & OBJECTIVE
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       Review of Systems   Constitutional: Negative for chills and fever.        No unusual weight changes       HENT:        STOPBANG score  1/ 8        Age over 50        Eyes:        No new visual changes   Respiratory:        No cough , phlegm    No Hemoptysis   Cardiovascular:        As noted   Gastrointestinal:        No overt GI/ blood losses  Bowel movements- Regular    Endocrine:        Prednisone use > 20 mg daily for 3 weeks- none    Genitourinary: Negative for dysuria.        No urinary hesitancy    Musculoskeletal:        As above   Occasional Rt great toe pain- None recently    Skin: Negative for rash.   Neurological: Negative for syncope.        No unilateral weakness   Hematological:        Current use of Anticoagulants  None    Psychiatric/Behavioral:        No Depression,Anxiety       No vascular stenting     No past medical history pertinent negatives.  No family history on file.      No anesthesia, bleeding, cardiac problems. PONV with previous surgeries/procedures.  Medications and Allergies reviewed in epic.   FH- No anesthesia,bleeding / venous thrombosis , early onset heart disease in family     Physical Exam  Blood pressure (!) 109/57, pulse 68, temperature 97.9 °F (36.6 °C), temperature source Oral, resp. rate 16, weight 63.5 kg (140 lb), SpO2 98 %.      Physical Exam  Constitutional- Vitals - Body mass index  is 25.61 kg/m².,   Vitals:    09/20/19 0747   BP: (!) 109/57   Pulse: 68   Resp:    Temp:      General appearance-Conscious,Coherent  Eyes- No conjunctival icterus,pupils  round , reactive to light  and  bilateral intra ocular lenses  ENT-Oral cavity- moist  , Hearing grossly normal   Neck- No thyromegaly ,Trachea -central, No jugular venous distension,   No Carotid Bruit   Cardiovascular -Heart Sounds- Normal  and  no murmur   , No gallop rhythm   Respiratory - Normal Respiratory Effort, Normal breath sounds,  no wheeze  and  no forced expiratory wheeze    Peripheral pitting pedal edema-- none  and  varicose veins right lower extremity , no calf pain   Gastrointestinal -Soft abdomen, No palpable masses, Non Tender,Liver,Spleen not palpable. No-- free fluid and shifting dullness  Musculoskeletal- No finger Clubbing. Strength grossly normal   Lymphatic-No Palpable cervical, axillary,Inguinal lymphadenopathy   Psychiatric - normal effect,Orientation  Rt Dorsalis pedis pulses-palpable    Lt Dorsalis pedis pulses- palpable   Rt Posterior tibial pulses -palpable   Left posterior tibial pulses -palpable   Miscellaneous -  no renal bruit  Investigations  Lab and Imaging have been reviewed in epic.    Review of Medicine tests    2012    GOOD LV FUNCTION      Review of old records- Was done and information gathered regards to events leading to surgery and health conditions of significance in the perioperative period.    Outpatient Subjective & Objective

## 2019-09-20 NOTE — PROGRESS NOTES
[facility] - Pre Op Consult  Progress Note    Patient Name: Celestine Diallo  MRN: [id number]  Date of Evaluation- 09/20/2019  PCP- Son SHAR Correa MD    Future cases for Celestine Diallo [[id number]]     Case ID Status Date Time Darrin Procedure Provider Location    [id number] [facility] 9/30/2019  1:00  ARTHROPLASTY, KNEE, TOTAL [facility] [[id number]] [facility] OR      Rt     HPI:  History of present illness- I had the pleasure of meeting this pleasant 73 y.o. lady in the pre op clinic prior to her elective Orthopedic surgery. The patient is new to me . Celestine was accompanied by reed Zimmerman .    I have obtained the history by speaking to the patient and by reviewing the electronic health records.    Events leading up to surgery / History of presenting illness -    She has been troubled with moderate-severe  Rt kne3 pain for 1 year  . Pain increases with Bending  and activity and decreases with resting.     Relevant health conditions of significance for the perioperative period/ History of presenting illness -    Subjectively describes health as good    Lives alone , independent   Son lives next door  Single level house- 2 steps to get in   Drives  Son going to help post op    Not known to have heart disease , Diabetes Mellitus, HTN, Lung disease         Subjective/ Objective:          Chief complaint-Preoperative evaluation, Perioperative Medical management, complication reduction plan     Active cardiac conditions- none    Revised cardiac risk index predictors- none    Functional capacity -Examples of physical activity , active person,goes to [employer], grocery,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       Review of Systems   Constitutional: Negative for chills and fever.        No unusual weight changes        HENT:        STOPBANG score  1/ 8        Age over 50        Eyes:        No new visual changes   Respiratory:        No cough , phlegm    No Hemoptysis   Cardiovascular:        As noted   Gastrointestinal:        No overt GI/ blood losses  Bowel movements- Regular    Endocrine:        Prednisone use > 20 mg daily for 3 weeks- none    Genitourinary: Negative for dysuria.        No urinary hesitancy    Musculoskeletal:        As above   Occasional Rt great toe pain- None recently    Skin: Negative for rash.   Neurological: Negative for syncope.        No unilateral weakness   Hematological:        Current use of Anticoagulants  None    Psychiatric/Behavioral:        No Depression,Anxiety       No vascular stenting     No past medical history pertinent negatives.  No family history on file.      No anesthesia, bleeding, cardiac problems. PONV with previous surgeries/procedures.  Medications and Allergies reviewed in epic.   FH- No anesthesia,bleeding / venous thrombosis , early onset heart disease in family     Physical Exam  Blood pressure (!) 109/57, pulse 68, temperature 97.9 °F (36.6 °C), temperature source Oral, resp. rate 16, weight 63.5 kg (140 lb), SpO2 98 %.      Physical Exam  Constitutional- Vitals - Body mass index is 25.61 kg/m².,   Vitals:    09/20/19 0747   BP: (!) 109/57   Pulse: 68   Resp:    Temp:      General appearance-Conscious,Coherent  Eyes- No conjunctival icterus,pupils  round , reactive to light  and  bilateral intra ocular lenses  ENT-Oral cavity- moist  , Hearing grossly normal   Neck- No thyromegaly ,Trachea -central, No jugular venous distension,   No Carotid Bruit   Cardiovascular -Heart Sounds- Normal  and  no murmur   , No gallop rhythm   Respiratory - Normal Respiratory Effort, Normal breath sounds,  no wheeze  and  no forced expiratory wheeze    Peripheral pitting pedal edema-- none  and  varicose veins right lower extremity , no calf pain   Gastrointestinal -Soft abdomen, No  palpable masses, Non Tender,Liver,Spleen not palpable. No-- free fluid and shifting dullness  Musculoskeletal- No finger Clubbing. Strength grossly normal   Lymphatic-No Palpable cervical, axillary,Inguinal lymphadenopathy   Psychiatric - normal effect,Orientation  Rt Dorsalis pedis pulses-palpable    Lt Dorsalis pedis pulses- palpable   Rt Posterior tibial pulses -palpable   Left posterior tibial pulses -palpable   Miscellaneous -  no renal bruit  Investigations  Lab and Imaging have been reviewed in [facility].    Review of Medicine tests    2012    GOOD LV FUNCTION      Review of old records- Was done and information gathered regards to events leading to surgery and health conditions of significance in the perioperative period.        Preoperative cardiac risk assessment-  The patient does not have any active cardiac conditions . Revised cardiac risk index predictors- -0--.Functional capacity is more than 4 Mets. She will be undergoing a Orthopedic procedure that carries a intermediate risk     Risk of a major Cardiac event ( Defined as death, myocardial infarction, or cardiac arrest at 30 days after noncardiac surgery), based on RCRI score     3.9%       No further cardiac work up is indicated prior to proceeding with the surgery          American Society of Anesthesiologists Physical status classification ( ASA ) class: 2     Postoperative pulmonary complication risk assessment:      ARISCAT ( [place]et) risk index- risk class -  Low, if duration of surgery is under 3 hours, intermediate, if duration of surgery is over 3 hours          Assessment/Plan:     H/O thyroidectomy  Had Thyroid surgery twice   Early 80's  Part of thyroid removed in [place]- Had goiter and was having problem with speaking , swallowing   Around 2004/2005 had second thyroid surgery -More of thyroid removal - for burping , for dysphagia , difficulty speaking   No longer having  difficulty speaking , swallowing   Seems to have had been on Thyroid  replacement until 2 years ago   No longer taking Thyroid replacement   Under lab monitoring every 6 months  No overt hypo/ hyper thyroid symptoms     Older records indicate- Sub sternal Thyroid   -  9/20- 18 57     TSH mildly abnormal at 0.387   Free T4 - N  No suggestion of Hypothyroidism   Sub clinical hyperthyroidism     Varicose veins of leg with swelling, right  Increased risk of thrombosis in the tripp operative period , compression stocking use discussed        Preventive perioperative care    Thromboembolic prophylaxis:  Her risk factors for thrombosis include surgical procedure and age.I suggest  thromboembolic prophylaxis ( mechanical/pharmacological, weighing the risk benefits of pharmacological agent use considering tripp procedural bleeding )  during the perioperative period.I suggested being active in the post operative period.   The patient is a candidate for extended DVT prophylaxis     Postoperative pulmonary complication prophylaxis-Risk factors for post operative pulmonary complications include age over 65 years and ASA class >2- I suggest incentive spirometry use, early ambulation and end tidal carbon dioxide monitoring  , oaall care, head end of bed elevation     Renal complication prophylaxis- I suggest keeping her well hydrated  in the perioperative period      Surgical site Infection Prophylaxis-I  suggest appropriate antibiotic for Prophylaxis against Surgical site infections      In view of Regional anesthesia the patient  is at risk of postoperative urinary retention.  I suggest avoidance / minimizing the of  Benzodiazepines,Anticholinergic medication,antihistamines ( Benadryl) , if possible in the perioperative period. I suggest using the minimum possible use of opioids for the minimum period of time in the perioperative period. Benadryl avoidance suggested      This visit was focused on Preoperative evaluation, Perioperative Medical management, complication reduction plans. I suggest that  the patient follows up with primary care or relevant sub specialists for ongoing health care.    I appreciate the opportunity to be involved in this patients care. Please feel free to contact me if there were any questions about this consultation.    Patient is optimized     Patient was instructed to call and update me about any changes to health,  medication, office visits ,testing out side of the tripp operative care center , hospitalizations between now and surgery     Bella Burnett MD  Perioperative Medicine  [facility]   Pager [phone]    [place]  --  9/20- 18 53    TSH mildly abnormal at 0.387   Free T4 - N  No suggestion of Hypothyroidism   Sub clinical hyperthyroidism       BMP,INR, Hb,HCT, PLT - N  EKG from 9/30/2019 - Personally reviewed reportedly showed   Normal sinus rhythm  Nonspecific T wave abnormality  Abnormal ECG    Suggest follow up in the Primary care setting regarding Sub clinical hyperthyroidism   Called to discuss Thyroid function - suggested follow up   Spoke to son - suggested follow up   No dysphagia, Breathing , speaking     No previous ECGs available  -  9/27- 2104     Called to follow up , spoke to son Erasmo to address any concerns with the up coming surgery or any questions on Medication instructions -  Doing well ,No changes to Medication, Health -  suggested follow up with PCP regarding thyroid function

## 2019-09-27 DIAGNOSIS — Z96.651 STATUS POST TOTAL RIGHT KNEE REPLACEMENT: Primary | ICD-10-CM

## 2019-09-27 RX ORDER — DEXTROMETHORPHAN HYDROBROMIDE, GUAIFENESIN 5; 100 MG/5ML; MG/5ML
650 LIQUID ORAL EVERY 8 HOURS PRN
Qty: 120 TABLET | Refills: 0 | Status: SHIPPED | OUTPATIENT
Start: 2019-09-27 | End: 2020-02-28

## 2019-09-27 RX ORDER — DOCUSATE SODIUM 100 MG/1
100 CAPSULE, LIQUID FILLED ORAL 2 TIMES DAILY PRN
Qty: 60 CAPSULE | Refills: 0 | Status: SHIPPED | OUTPATIENT
Start: 2019-09-27 | End: 2020-02-28

## 2019-09-27 RX ORDER — OXYCODONE HYDROCHLORIDE 5 MG/1
5 TABLET ORAL EVERY 6 HOURS PRN
Qty: 30 TABLET | Refills: 0 | Status: SHIPPED | OUTPATIENT
Start: 2019-09-27 | End: 2019-10-15 | Stop reason: SDUPTHER

## 2019-09-27 RX ORDER — ASPIRIN 81 MG/1
81 TABLET ORAL 2 TIMES DAILY
Qty: 60 TABLET | Refills: 0 | Status: SHIPPED | OUTPATIENT
Start: 2019-09-27 | End: 2020-02-28

## 2019-09-30 ENCOUNTER — ANESTHESIA (OUTPATIENT)
Dept: SURGERY | Facility: HOSPITAL | Age: 74
DRG: 470 | End: 2019-09-30
Payer: MEDICARE

## 2019-09-30 ENCOUNTER — HOSPITAL ENCOUNTER (OUTPATIENT)
Facility: HOSPITAL | Age: 74
Discharge: HOME HEALTH | DRG: 470 | End: 2019-10-01
Attending: ORTHOPAEDIC SURGERY | Admitting: ORTHOPAEDIC SURGERY
Payer: MEDICARE

## 2019-09-30 DIAGNOSIS — M17.11 PRIMARY OSTEOARTHRITIS OF RIGHT KNEE: ICD-10-CM

## 2019-09-30 PROCEDURE — 64448 NJX AA&/STRD FEM NRV NFS IMG: CPT | Mod: 59,RT,, | Performed by: ANESTHESIOLOGY

## 2019-09-30 PROCEDURE — 27447 TOTAL KNEE ARTHROPLASTY: CPT | Mod: RT,,, | Performed by: ORTHOPAEDIC SURGERY

## 2019-09-30 PROCEDURE — 25000003 PHARM REV CODE 250: Performed by: NURSE ANESTHETIST, CERTIFIED REGISTERED

## 2019-09-30 PROCEDURE — C1713 ANCHOR/SCREW BN/BN,TIS/BN: HCPCS | Performed by: ORTHOPAEDIC SURGERY

## 2019-09-30 PROCEDURE — 25000003 PHARM REV CODE 250: Performed by: ANESTHESIOLOGY

## 2019-09-30 PROCEDURE — D9220A PRA ANESTHESIA: ICD-10-PCS | Mod: CRNA,,, | Performed by: NURSE ANESTHETIST, CERTIFIED REGISTERED

## 2019-09-30 PROCEDURE — 99900035 HC TECH TIME PER 15 MIN (STAT)

## 2019-09-30 PROCEDURE — D9220A PRA ANESTHESIA: Mod: CRNA,,, | Performed by: NURSE ANESTHETIST, CERTIFIED REGISTERED

## 2019-09-30 PROCEDURE — 63600175 PHARM REV CODE 636 W HCPCS: Performed by: PHYSICIAN ASSISTANT

## 2019-09-30 PROCEDURE — 94770 HC EXHALED C02 TEST: CPT

## 2019-09-30 PROCEDURE — C1751 CATH, INF, PER/CENT/MIDLINE: HCPCS | Performed by: ANESTHESIOLOGY

## 2019-09-30 PROCEDURE — 37000009 HC ANESTHESIA EA ADD 15 MINS: Performed by: ORTHOPAEDIC SURGERY

## 2019-09-30 PROCEDURE — 97161 PT EVAL LOW COMPLEX 20 MIN: CPT

## 2019-09-30 PROCEDURE — 63600175 PHARM REV CODE 636 W HCPCS: Performed by: NURSE ANESTHETIST, CERTIFIED REGISTERED

## 2019-09-30 PROCEDURE — 97530 THERAPEUTIC ACTIVITIES: CPT

## 2019-09-30 PROCEDURE — 11000001 HC ACUTE MED/SURG PRIVATE ROOM

## 2019-09-30 PROCEDURE — 88305 TISSUE EXAM BY PATHOLOGIST: CPT | Mod: 26,,, | Performed by: PATHOLOGY

## 2019-09-30 PROCEDURE — D9220A PRA ANESTHESIA: Mod: ANES,,, | Performed by: ANESTHESIOLOGY

## 2019-09-30 PROCEDURE — 27201423 OPTIME MED/SURG SUP & DEVICES STERILE SUPPLY: Performed by: ORTHOPAEDIC SURGERY

## 2019-09-30 PROCEDURE — 37000008 HC ANESTHESIA 1ST 15 MINUTES: Performed by: ORTHOPAEDIC SURGERY

## 2019-09-30 PROCEDURE — 71000015 HC POSTOP RECOV 1ST HR: Performed by: ORTHOPAEDIC SURGERY

## 2019-09-30 PROCEDURE — 27447 PR TOTAL KNEE ARTHROPLASTY: ICD-10-PCS | Mod: RT,,, | Performed by: ORTHOPAEDIC SURGERY

## 2019-09-30 PROCEDURE — 27200688 HC TRAY, SPINAL-HYPER/ ISOBARIC: Performed by: NURSE ANESTHETIST, CERTIFIED REGISTERED

## 2019-09-30 PROCEDURE — 76942 ADDUCTOR CANAL CATHETER: ICD-10-PCS | Mod: 26,,, | Performed by: ANESTHESIOLOGY

## 2019-09-30 PROCEDURE — 88311 TISSUE SPECIMEN TO PATHOLOGY - SURGERY: ICD-10-PCS | Mod: 26,,, | Performed by: PATHOLOGY

## 2019-09-30 PROCEDURE — 97165 OT EVAL LOW COMPLEX 30 MIN: CPT

## 2019-09-30 PROCEDURE — 25000003 PHARM REV CODE 250: Performed by: ORTHOPAEDIC SURGERY

## 2019-09-30 PROCEDURE — 36000710: Performed by: ORTHOPAEDIC SURGERY

## 2019-09-30 PROCEDURE — 88305 TISSUE EXAM BY PATHOLOGIST: CPT | Performed by: PATHOLOGY

## 2019-09-30 PROCEDURE — 94799 UNLISTED PULMONARY SVC/PX: CPT

## 2019-09-30 PROCEDURE — D9220A PRA ANESTHESIA: ICD-10-PCS | Mod: ANES,,, | Performed by: ANESTHESIOLOGY

## 2019-09-30 PROCEDURE — 97116 GAIT TRAINING THERAPY: CPT | Mod: 59

## 2019-09-30 PROCEDURE — 25000003 PHARM REV CODE 250: Performed by: PHYSICIAN ASSISTANT

## 2019-09-30 PROCEDURE — C1776 JOINT DEVICE (IMPLANTABLE): HCPCS | Performed by: ORTHOPAEDIC SURGERY

## 2019-09-30 PROCEDURE — 36000711: Performed by: ORTHOPAEDIC SURGERY

## 2019-09-30 PROCEDURE — 64448 ADDUCTOR CANAL CATHETER: ICD-10-PCS | Mod: 59,RT,, | Performed by: ANESTHESIOLOGY

## 2019-09-30 PROCEDURE — 01402 ANES OPN/ARTH TOT KNE ARTHRP: CPT | Performed by: ORTHOPAEDIC SURGERY

## 2019-09-30 PROCEDURE — 76942 ECHO GUIDE FOR BIOPSY: CPT | Performed by: ANESTHESIOLOGY

## 2019-09-30 PROCEDURE — 94761 N-INVAS EAR/PLS OXIMETRY MLT: CPT

## 2019-09-30 PROCEDURE — 63600175 PHARM REV CODE 636 W HCPCS: Performed by: ANESTHESIOLOGY

## 2019-09-30 PROCEDURE — 63600175 PHARM REV CODE 636 W HCPCS: Performed by: ORTHOPAEDIC SURGERY

## 2019-09-30 PROCEDURE — 88305 TISSUE SPECIMEN TO PATHOLOGY - SURGERY: ICD-10-PCS | Mod: 26,,, | Performed by: PATHOLOGY

## 2019-09-30 PROCEDURE — 71000033 HC RECOVERY, INTIAL HOUR: Performed by: ORTHOPAEDIC SURGERY

## 2019-09-30 PROCEDURE — 88311 DECALCIFY TISSUE: CPT | Mod: 26,,, | Performed by: PATHOLOGY

## 2019-09-30 DEVICE — PLUG BONE #5: Type: IMPLANTABLE DEVICE | Site: KNEE | Status: FUNCTIONAL

## 2019-09-30 DEVICE — PSN ALL POLY PAT 32MM: Type: IMPLANTABLE DEVICE | Site: KNEE | Status: FUNCTIONAL

## 2019-09-30 DEVICE — IMPLANTABLE DEVICE: Type: IMPLANTABLE DEVICE | Site: KNEE | Status: FUNCTIONAL

## 2019-09-30 DEVICE — INSERT TIBIAL PSN 3.5CD 10MM R: Type: IMPLANTABLE DEVICE | Site: KNEE | Status: FUNCTIONAL

## 2019-09-30 DEVICE — CEMENT BONE WHOLE BATCH: Type: IMPLANTABLE DEVICE | Site: KNEE | Status: FUNCTIONAL

## 2019-09-30 DEVICE — BASEPLATE TIB PSN CEM SZ C 5 R: Type: IMPLANTABLE DEVICE | Site: KNEE | Status: FUNCTIONAL

## 2019-09-30 RX ORDER — LIDOCAINE HYDROCHLORIDE 10 MG/ML
1 INJECTION, SOLUTION EPIDURAL; INFILTRATION; INTRACAUDAL; PERINEURAL
Status: DISCONTINUED | OUTPATIENT
Start: 2019-09-30 | End: 2019-09-30 | Stop reason: HOSPADM

## 2019-09-30 RX ORDER — OXYCODONE HYDROCHLORIDE 10 MG/1
10 TABLET ORAL
Status: DISCONTINUED | OUTPATIENT
Start: 2019-09-30 | End: 2019-10-01 | Stop reason: HOSPADM

## 2019-09-30 RX ORDER — OXYCODONE HYDROCHLORIDE 5 MG/1
10 TABLET ORAL
Status: DISCONTINUED | OUTPATIENT
Start: 2019-09-30 | End: 2019-09-30

## 2019-09-30 RX ORDER — VANCOMYCIN HCL IN 5 % DEXTROSE 1G/250ML
1000 PLASTIC BAG, INJECTION (ML) INTRAVENOUS
Status: COMPLETED | OUTPATIENT
Start: 2019-09-30 | End: 2019-09-30

## 2019-09-30 RX ORDER — ROPIVACAINE HYDROCHLORIDE 2 MG/ML
8 INJECTION, SOLUTION EPIDURAL; INFILTRATION; PERINEURAL CONTINUOUS
Status: DISCONTINUED | OUTPATIENT
Start: 2019-09-30 | End: 2019-10-01 | Stop reason: HOSPADM

## 2019-09-30 RX ORDER — DEXMEDETOMIDINE HYDROCHLORIDE 100 UG/ML
INJECTION, SOLUTION INTRAVENOUS
Status: DISCONTINUED | OUTPATIENT
Start: 2019-09-30 | End: 2019-09-30

## 2019-09-30 RX ORDER — ONDANSETRON 2 MG/ML
4 INJECTION INTRAMUSCULAR; INTRAVENOUS EVERY 8 HOURS PRN
Status: DISCONTINUED | OUTPATIENT
Start: 2019-09-30 | End: 2019-10-01 | Stop reason: HOSPADM

## 2019-09-30 RX ORDER — PROPOFOL 10 MG/ML
VIAL (ML) INTRAVENOUS CONTINUOUS PRN
Status: DISCONTINUED | OUTPATIENT
Start: 2019-09-30 | End: 2019-09-30

## 2019-09-30 RX ORDER — FENTANYL CITRATE 50 UG/ML
25 INJECTION, SOLUTION INTRAMUSCULAR; INTRAVENOUS EVERY 5 MIN PRN
Status: DISCONTINUED | OUTPATIENT
Start: 2019-09-30 | End: 2019-09-30 | Stop reason: HOSPADM

## 2019-09-30 RX ORDER — BUPIVACAINE HYDROCHLORIDE AND EPINEPHRINE 2.5; 5 MG/ML; UG/ML
INJECTION, SOLUTION EPIDURAL; INFILTRATION; INTRACAUDAL; PERINEURAL
Status: COMPLETED | OUTPATIENT
Start: 2019-09-30 | End: 2019-09-30

## 2019-09-30 RX ORDER — SODIUM CHLORIDE 9 MG/ML
INJECTION, SOLUTION INTRAVENOUS CONTINUOUS
Status: DISCONTINUED | OUTPATIENT
Start: 2019-09-30 | End: 2019-10-01 | Stop reason: HOSPADM

## 2019-09-30 RX ORDER — MIDAZOLAM HYDROCHLORIDE 1 MG/ML
1 INJECTION INTRAMUSCULAR; INTRAVENOUS EVERY 5 MIN PRN
Status: DISCONTINUED | OUTPATIENT
Start: 2019-09-30 | End: 2019-09-30 | Stop reason: HOSPADM

## 2019-09-30 RX ORDER — ACETAMINOPHEN 500 MG
1000 TABLET ORAL EVERY 6 HOURS
Status: DISCONTINUED | OUTPATIENT
Start: 2019-09-30 | End: 2019-09-30 | Stop reason: HOSPADM

## 2019-09-30 RX ORDER — MORPHINE SULFATE 2 MG/ML
2 INJECTION, SOLUTION INTRAMUSCULAR; INTRAVENOUS
Status: DISCONTINUED | OUTPATIENT
Start: 2019-09-30 | End: 2019-10-01 | Stop reason: HOSPADM

## 2019-09-30 RX ORDER — MORPHINE SULFATE 2 MG/ML
2 INJECTION, SOLUTION INTRAMUSCULAR; INTRAVENOUS
Status: DISCONTINUED | OUTPATIENT
Start: 2019-09-30 | End: 2019-09-30

## 2019-09-30 RX ORDER — CEFAZOLIN SODIUM 1 G/3ML
2 INJECTION, POWDER, FOR SOLUTION INTRAMUSCULAR; INTRAVENOUS
Status: COMPLETED | OUTPATIENT
Start: 2019-09-30 | End: 2019-09-30

## 2019-09-30 RX ORDER — PREGABALIN 75 MG/1
75 CAPSULE ORAL NIGHTLY
Status: DISCONTINUED | OUTPATIENT
Start: 2019-09-30 | End: 2019-10-01 | Stop reason: HOSPADM

## 2019-09-30 RX ORDER — OXYCODONE HYDROCHLORIDE 5 MG/1
15 TABLET ORAL
Status: DISCONTINUED | OUTPATIENT
Start: 2019-09-30 | End: 2019-09-30

## 2019-09-30 RX ORDER — POLYETHYLENE GLYCOL 3350 17 G/17G
17 POWDER, FOR SOLUTION ORAL DAILY
Status: DISCONTINUED | OUTPATIENT
Start: 2019-10-01 | End: 2019-10-01 | Stop reason: HOSPADM

## 2019-09-30 RX ORDER — ACETAMINOPHEN 10 MG/ML
1000 INJECTION, SOLUTION INTRAVENOUS ONCE
Status: DISCONTINUED | OUTPATIENT
Start: 2019-09-30 | End: 2019-09-30

## 2019-09-30 RX ORDER — MUPIROCIN 20 MG/G
1 OINTMENT TOPICAL
Status: COMPLETED | OUTPATIENT
Start: 2019-09-30 | End: 2019-09-30

## 2019-09-30 RX ORDER — OXYCODONE HYDROCHLORIDE 5 MG/1
5 TABLET ORAL
Status: DISCONTINUED | OUTPATIENT
Start: 2019-09-30 | End: 2019-09-30

## 2019-09-30 RX ORDER — CEFAZOLIN SODIUM 1 G/3ML
2 INJECTION, POWDER, FOR SOLUTION INTRAMUSCULAR; INTRAVENOUS
Status: COMPLETED | OUTPATIENT
Start: 2019-09-30 | End: 2019-10-01

## 2019-09-30 RX ORDER — KETAMINE HYDROCHLORIDE 10 MG/ML
INJECTION, SOLUTION INTRAMUSCULAR; INTRAVENOUS
Status: DISCONTINUED | OUTPATIENT
Start: 2019-09-30 | End: 2019-09-30

## 2019-09-30 RX ORDER — PREGABALIN 75 MG/1
75 CAPSULE ORAL
Status: COMPLETED | OUTPATIENT
Start: 2019-09-30 | End: 2019-09-30

## 2019-09-30 RX ORDER — ACETAMINOPHEN 500 MG
1000 TABLET ORAL EVERY 6 HOURS
Status: DISCONTINUED | OUTPATIENT
Start: 2019-09-30 | End: 2019-09-30

## 2019-09-30 RX ORDER — PROPOFOL 10 MG/ML
VIAL (ML) INTRAVENOUS
Status: DISCONTINUED | OUTPATIENT
Start: 2019-09-30 | End: 2019-09-30

## 2019-09-30 RX ORDER — MUPIROCIN 20 MG/G
1 OINTMENT TOPICAL 2 TIMES DAILY
Status: DISCONTINUED | OUTPATIENT
Start: 2019-09-30 | End: 2019-10-01 | Stop reason: HOSPADM

## 2019-09-30 RX ORDER — DEXAMETHASONE SODIUM PHOSPHATE 4 MG/ML
INJECTION, SOLUTION INTRA-ARTICULAR; INTRALESIONAL; INTRAMUSCULAR; INTRAVENOUS; SOFT TISSUE
Status: DISCONTINUED | OUTPATIENT
Start: 2019-09-30 | End: 2019-09-30

## 2019-09-30 RX ORDER — ASPIRIN 81 MG/1
81 TABLET ORAL 2 TIMES DAILY
Status: DISCONTINUED | OUTPATIENT
Start: 2019-09-30 | End: 2019-10-01 | Stop reason: HOSPADM

## 2019-09-30 RX ORDER — AMOXICILLIN 250 MG
1 CAPSULE ORAL 2 TIMES DAILY
Status: DISCONTINUED | OUTPATIENT
Start: 2019-09-30 | End: 2019-10-01 | Stop reason: HOSPADM

## 2019-09-30 RX ORDER — ACETAMINOPHEN 500 MG
1000 TABLET ORAL ONCE
Status: COMPLETED | OUTPATIENT
Start: 2019-09-30 | End: 2019-09-30

## 2019-09-30 RX ORDER — FAMOTIDINE 20 MG/1
20 TABLET, FILM COATED ORAL 2 TIMES DAILY
Status: DISCONTINUED | OUTPATIENT
Start: 2019-09-30 | End: 2019-10-01 | Stop reason: HOSPADM

## 2019-09-30 RX ORDER — SODIUM CHLORIDE 9 MG/ML
INJECTION, SOLUTION INTRAVENOUS
Status: COMPLETED | OUTPATIENT
Start: 2019-09-30 | End: 2019-09-30

## 2019-09-30 RX ORDER — VANCOMYCIN HCL IN 5 % DEXTROSE 1G/250ML
1000 PLASTIC BAG, INJECTION (ML) INTRAVENOUS
Status: COMPLETED | OUTPATIENT
Start: 2019-09-30 | End: 2019-10-01

## 2019-09-30 RX ORDER — CELECOXIB 200 MG/1
400 CAPSULE ORAL
Status: COMPLETED | OUTPATIENT
Start: 2019-09-30 | End: 2019-09-30

## 2019-09-30 RX ORDER — GENTAMICIN SULFATE 40 MG/ML
INJECTION, SOLUTION INTRAMUSCULAR; INTRAVENOUS
Status: DISCONTINUED | OUTPATIENT
Start: 2019-09-30 | End: 2019-09-30 | Stop reason: HOSPADM

## 2019-09-30 RX ORDER — SODIUM CHLORIDE 0.9 % (FLUSH) 0.9 %
10 SYRINGE (ML) INJECTION
Status: DISCONTINUED | OUTPATIENT
Start: 2019-09-30 | End: 2019-09-30 | Stop reason: HOSPADM

## 2019-09-30 RX ORDER — CELECOXIB 100 MG/1
200 CAPSULE ORAL DAILY
Status: DISCONTINUED | OUTPATIENT
Start: 2019-10-01 | End: 2019-10-01 | Stop reason: HOSPADM

## 2019-09-30 RX ORDER — OXYCODONE HYDROCHLORIDE 5 MG/1
5 TABLET ORAL
Status: DISCONTINUED | OUTPATIENT
Start: 2019-09-30 | End: 2019-10-01 | Stop reason: HOSPADM

## 2019-09-30 RX ORDER — SODIUM CHLORIDE 9 MG/ML
INJECTION, SOLUTION INTRAVENOUS CONTINUOUS PRN
Status: DISCONTINUED | OUTPATIENT
Start: 2019-09-30 | End: 2019-09-30

## 2019-09-30 RX ORDER — NALOXONE HCL 0.4 MG/ML
0.02 VIAL (ML) INJECTION
Status: DISCONTINUED | OUTPATIENT
Start: 2019-09-30 | End: 2019-10-01 | Stop reason: HOSPADM

## 2019-09-30 RX ORDER — BISACODYL 10 MG
10 SUPPOSITORY, RECTAL RECTAL EVERY 12 HOURS PRN
Status: DISCONTINUED | OUTPATIENT
Start: 2019-09-30 | End: 2019-10-01 | Stop reason: HOSPADM

## 2019-09-30 RX ORDER — RAMELTEON 8 MG/1
8 TABLET ORAL NIGHTLY PRN
Status: DISCONTINUED | OUTPATIENT
Start: 2019-09-30 | End: 2019-10-01 | Stop reason: HOSPADM

## 2019-09-30 RX ADMIN — MUPIROCIN 1 G: 20 OINTMENT TOPICAL at 09:09

## 2019-09-30 RX ADMIN — VANCOMYCIN HYDROCHLORIDE 1000 MG: 1 INJECTION, POWDER, FOR SOLUTION INTRAVENOUS at 11:09

## 2019-09-30 RX ADMIN — PREGABALIN 75 MG: 75 CAPSULE ORAL at 09:09

## 2019-09-30 RX ADMIN — OXYCODONE HYDROCHLORIDE 5 MG: 5 TABLET ORAL at 04:09

## 2019-09-30 RX ADMIN — KETAMINE HYDROCHLORIDE 10 MG: 10 INJECTION, SOLUTION INTRAMUSCULAR; INTRAVENOUS at 01:09

## 2019-09-30 RX ADMIN — OXYCODONE HYDROCHLORIDE 10 MG: 10 TABLET ORAL at 09:09

## 2019-09-30 RX ADMIN — ASPIRIN 81 MG: 81 TABLET, COATED ORAL at 09:09

## 2019-09-30 RX ADMIN — DEXAMETHASONE SODIUM PHOSPHATE 8 MG: 4 INJECTION, SOLUTION INTRAMUSCULAR; INTRAVENOUS at 01:09

## 2019-09-30 RX ADMIN — CEFAZOLIN 2 G: 330 INJECTION, POWDER, FOR SOLUTION INTRAMUSCULAR; INTRAVENOUS at 01:09

## 2019-09-30 RX ADMIN — PROPOFOL 20 MG: 10 INJECTION, EMULSION INTRAVENOUS at 01:09

## 2019-09-30 RX ADMIN — PROPOFOL 50 MCG/KG/MIN: 10 INJECTION, EMULSION INTRAVENOUS at 01:09

## 2019-09-30 RX ADMIN — KETAMINE HYDROCHLORIDE 20 MG: 10 INJECTION, SOLUTION INTRAMUSCULAR; INTRAVENOUS at 01:09

## 2019-09-30 RX ADMIN — ONDANSETRON 4 MG: 2 INJECTION INTRAMUSCULAR; INTRAVENOUS at 03:09

## 2019-09-30 RX ADMIN — MIDAZOLAM HYDROCHLORIDE 2 MG: 1 INJECTION, SOLUTION INTRAMUSCULAR; INTRAVENOUS at 10:09

## 2019-09-30 RX ADMIN — VANCOMYCIN HYDROCHLORIDE 1000 MG: 1 INJECTION, POWDER, FOR SOLUTION INTRAVENOUS at 09:09

## 2019-09-30 RX ADMIN — DEXMEDETOMIDINE HYDROCHLORIDE 4 MCG: 100 INJECTION, SOLUTION, CONCENTRATE INTRAVENOUS at 01:09

## 2019-09-30 RX ADMIN — SODIUM CHLORIDE: 0.9 INJECTION, SOLUTION INTRAVENOUS at 03:09

## 2019-09-30 RX ADMIN — SENNOSIDES AND DOCUSATE SODIUM 1 TABLET: 8.6; 5 TABLET ORAL at 09:09

## 2019-09-30 RX ADMIN — ACETAMINOPHEN 1000 MG: 500 TABLET ORAL at 11:09

## 2019-09-30 RX ADMIN — CELECOXIB 400 MG: 200 CAPSULE ORAL at 09:09

## 2019-09-30 RX ADMIN — SODIUM CHLORIDE: 0.9 INJECTION, SOLUTION INTRAVENOUS at 12:09

## 2019-09-30 RX ADMIN — SODIUM CHLORIDE, SODIUM GLUCONATE, SODIUM ACETATE, POTASSIUM CHLORIDE, MAGNESIUM CHLORIDE, SODIUM PHOSPHATE, DIBASIC, AND POTASSIUM PHOSPHATE: .53; .5; .37; .037; .03; .012; .00082 INJECTION, SOLUTION INTRAVENOUS at 12:09

## 2019-09-30 RX ADMIN — BUPIVACAINE HYDROCHLORIDE AND EPINEPHRINE 20 ML: 2.5; 5 INJECTION, SOLUTION EPIDURAL; INFILTRATION; INTRACAUDAL; PERINEURAL at 10:09

## 2019-09-30 RX ADMIN — FAMOTIDINE 20 MG: 20 TABLET ORAL at 09:09

## 2019-09-30 RX ADMIN — MEPIVACAINE HYDROCHLORIDE 3 ML: 15 INJECTION, SOLUTION EPIDURAL; INFILTRATION at 01:09

## 2019-09-30 RX ADMIN — SODIUM CHLORIDE: 0.9 INJECTION, SOLUTION INTRAVENOUS at 09:09

## 2019-09-30 RX ADMIN — CEFAZOLIN 2 G: 1 INJECTION, POWDER, FOR SOLUTION INTRAMUSCULAR; INTRAVENOUS at 09:09

## 2019-09-30 RX ADMIN — ROPIVACAINE HYDROCHLORIDE 8 ML/HR: 2 INJECTION, SOLUTION EPIDURAL; INFILTRATION at 03:09

## 2019-09-30 NOTE — BRIEF OP NOTE
[facility]  Surgery Department  Operative Note    SUMMARY     Date of Procedure: 9/30/2019     Procedure: Procedure(s) (LRB):  ARTHROPLASTY, KNEE, TOTAL (Right)     Surgeon(s) and Role:     * [facility] MD - Primary    Assisting Surgeon: None    Pre-Operative Diagnosis: Osteoarthritis of knee, unspecified (CODE) [M17.9]    Post-Operative Diagnosis: Post-Op Diagnosis Codes:     * Osteoarthritis of knee, unspecified (CODE) [M17.9]    Anesthesia: Femoral Block    Technical Procedures Used:  PS       Description of the Findings of the Procedure: Valgus    Significant Surgical Tasks Conducted by the Assistant(s), if Applicable: closure    Complications: No    Estimated Blood Loss (EBL): 100cc             Implants:   Implant Name Type Inv. Item Serial No.  Lot No. LRB No. Used   CEMENT BONE WHOLE BATCH - [id number]  CEMENT BONE WHOLE BATCH  [employer] [id number] Right 1   CEMENT BONE WHOLE BATCH - [id number]  CEMENT BONE WHOLE BATCH  [employer] [id number] Right 1   PLUG BONE #5 - [id number]  PLUG BONE #5  [employer] DOMO. [id number] Right 1   SCREW HEX HEAD - [id number]  SCREW HEX HEAD  [employer],INC [id number] Right 1   BIT DRILL PIN TROCAR 3.2MM - [id number]  BIT DRILL PIN TROCAR 3.2MM  [employer],INC [id number] Right 1   SCREW HEX HEAD 3.5X38MM - [id number]  SCREW HEX HEAD 3.5X38MM  [employer],INC [id number] Right 1   PSN ALL POLY PAT 32MM - [id number]  PSN ALL POLY PAT 32MM  [employer],INC [id number] Right 1   COMP FEM POST PSN SZ 5 RT - [id number]  COMP FEM POST PSN SZ 5 RT  JACQU[employer]INE,INC [id number] Right 1   BASEPLATE TIB PSN DOUGLAS SZ C 5 R - [id number]  BASEPLATE TIB PSN DOUGLAS SZ C 5 R  [employer],INC [id number] Right 1   INSERT TIBIAL PSN 3.5CD 10MM R - [id number]  INSERT TIBIAL PSN 3.5CD 10MM R  [employer]AC[employer],INC [id number] Right 1       Specimens:   Specimen (12h ago, onward)     Start     Ordered    09/30/19 1513  Specimen to Pathology - Surgery  Once     Comments:  Bone and soft tissue of right  Emmanuelle      09/30/19 1513                        Condition: Good    Disposition: PACU - hemodynamically stable.    Attestation: I was present and scrubbed for the entire procedure.

## 2019-09-30 NOTE — PLAN OF CARE
Patient tolerated PT session fairly well. She ambulated 12ft with RW and CGA. She was limited in ambulation distance due to nausea. She is okay to ambulate with nursing staff assistance and RW.       Problem: Physical Therapy Goal  Goal: Physical Therapy Goal  Description  Goals to be met by: 10/7/19    Patient will increase functional independence with mobility by performin. Supine to sit with supervision  2. Sit to supine with supervision  3. Sit to stand transfer with Supervision  4. Gait x200 feet with Supervision using Rolling Walker  5. Ascend/Descend 2 steps with Contact Guard Assistance using bilateral handrails   6. Lower extremity exercise program x30 reps per handout, with supervision         Outcome: Ongoing, Progressing

## 2019-09-30 NOTE — ANESTHESIA PROCEDURE NOTES
Adductor Canal Catheter    Patient location during procedure: pre-op   Block not for primary anesthetic.  Reason for block: at surgeon's request and post-op pain management   Post-op Pain Location: Right knee pain  Start time: 9/30/2019 10:35 AM  Timeout: 9/30/2019 10:33 AM   End time: 9/30/2019 10:45 AM    Staffing  Authorizing Provider: Shmuel Barrera MD  Performing Provider: Shmuel Barrera MD    Preanesthetic Checklist  Completed: patient identified, site marked, surgical consent, pre-op evaluation, timeout performed, IV checked, risks and benefits discussed and monitors and equipment checked  Peripheral Block  Patient position: supine  Prep: ChloraPrep and site prepped and draped  Patient monitoring: heart rate, cardiac monitor, continuous pulse ox, continuous capnometry and frequent blood pressure checks  Block type: adductor canal  Laterality: right  Injection technique: continuous  Needle  Needle type: Tuohy   Needle gauge: 17 G  Needle length: 3.5 in  Needle localization: anatomical landmarks and ultrasound guidance  Catheter type: spring wound  Catheter size: 19 G  Catheter at skin depth: 15 cm  Test dose: lidocaine 1.5% with Epi 1-to-200,000 and negative   -ultrasound image captured on disc.  Assessment  Injection assessment: negative aspiration, negative parasthesia and local visualized surrounding nerve  Paresthesia pain: none  Heart rate change: no  Slow fractionated injection: yes  Additional Notes  VSS.  DOS[facility] RN monitoring vitals throughout procedure.  Patient tolerated procedure well.

## 2019-09-30 NOTE — NURSING
Pt oriented to room and call bell, instructed not to get out of bed without assistance. Nad. Will continue to monitor

## 2019-09-30 NOTE — TRANSFER OF CARE
"Anesthesia Transfer of Care Note    Patient: Celestine Diallo    Procedure(s) Performed: Procedure(s) (LRB):  ARTHROPLASTY, KNEE, TOTAL (Right)    Patient location: PACU    Anesthesia Type: general    Transport from OR: Transported from OR on 100% O2 by closed face mask with adequate spontaneous ventilation    Post pain: adequate analgesia    Post assessment: no apparent anesthetic complications and tolerated procedure well    Post vital signs: stable    Level of consciousness: awake, alert and oriented    Nausea/Vomiting: no nausea/vomiting    Complications: none    Transfer of care protocol was followed      Last vitals:   Visit Vitals  BP (!) 119/59 (BP Location: Right arm, Patient Position: Lying)   Pulse 68   Temp 36.7 °C (98.1 °F) (Oral)   Resp 15   Ht 5' 2" (1.575 m)   Wt 64.9 kg (143 lb)   SpO2 100%   Breastfeeding? No   BMI 26.16 kg/m²     "

## 2019-09-30 NOTE — BRIEF OP NOTE
[facility]  Brief Operative Note    SUMMARY     Surgery Date: 9/30/2019     Surgeon(s) and Role:     * [facility] MD - Primary    Assisting Surgeon: Andria Jean MD    Pre-op Diagnosis:  Osteoarthritis of knee, unspecified (CODE) [M17.9]    Post-op Diagnosis:  Post-Op Diagnosis Codes:     * Osteoarthritis of knee, unspecified (CODE) [M17.9]    Procedure(s) (LRB):  ARTHROPLASTY, KNEE, TOTAL (Right)    Anesthesia: Femoral Block    Description of Procedure: see op note    Description of the findings of the procedure: see op ntoe    Estimated Blood Loss: minimal         Specimens:   Specimen (12h ago, onward)     Start     Ordered    09/30/19 1513  Specimen to Pathology - Surgery  Once     Comments:  Bone and soft tissue of right kneeGross      09/30/19 1514

## 2019-09-30 NOTE — ANESTHESIA POSTPROCEDURE EVALUATION
Anesthesia Post Evaluation    Patient: Celestine Diallo    Procedure(s) Performed: Procedure(s) (LRB):  ARTHROPLASTY, KNEE, TOTAL (Right)    Final Anesthesia Type: general  Patient location during evaluation: PACU  Patient participation: Yes- Able to Participate  Level of consciousness: awake and alert  Post-procedure vital signs: reviewed and stable  Pain management: adequate  Airway patency: patent  PONV status at discharge: No PONV  Anesthetic complications: no      Cardiovascular status: blood pressure returned to baseline  Respiratory status: unassisted  Hydration status: euvolemic  Follow-up not needed.          Vitals Value Taken Time   /61 9/30/2019  5:08 PM   Temp 35.9 °C (96.6 °F) 9/30/2019  5:08 PM   Pulse 76 9/30/2019  5:08 PM   Resp 18 9/30/2019  5:08 PM   SpO2 100 % 9/30/2019  5:08 PM         Event Time     Out of Recovery 16:15:00          Pain/Rosa Maria Score: Pain Rating Prior to Med Admin: 4 (9/30/2019  4:22 PM)  Rosa Maria Score: 9 (9/30/2019  4:30 PM)

## 2019-09-30 NOTE — ANESTHESIA PROCEDURE NOTES
Spinal    Diagnosis: right knee pain   Patient location during procedure: OR  Start time: 9/30/2019 1:08 PM  Timeout: 9/30/2019 1:03 PM  End time: 9/30/2019 1:12 PM    Staffing  Authorizing Provider: Bethanie Sykes MD  Performing Provider: Bethanie Sykes MD    Preanesthetic Checklist  Completed: patient identified, site marked, surgical consent, pre-op evaluation, timeout performed, IV checked, risks and benefits discussed and monitors and equipment checked  Spinal Block  Patient position: sitting  Patient monitoring: frequent blood pressure checks, continuous capnometry, continuous pulse ox, heart rate and cardiac monitor  Approach: midline  Location: L3-4  Injection technique: single shot  CSF Fluid: clear free-flowing CSF  Needle  Needle type: Luis Fernando   Needle gauge: 25 G  Needle length: 3.5 in  Additional Documentation: no paresthesia on injection  Needle localization: anatomical landmarks  Assessment  Sensory level: T9   Dermatomal levels determined by pinch or prick  Ease of block: easy  Patient's tolerance of the procedure: comfortable throughout block and no complaints

## 2019-09-30 NOTE — NURSING TRANSFER
Nursing Transfer Note      9/30/2019     Transfer To: 306    Transfer via bed    Transported by RN X 2    Medicines sent: MIVF  , PNC @ 8     Chart send with patient: Yes    Notified: son    Patient reassessed at: 09/30, 1630     Report given to Meeta PASTRANA

## 2019-10-01 VITALS
TEMPERATURE: 98 F | SYSTOLIC BLOOD PRESSURE: 110 MMHG | RESPIRATION RATE: 18 BRPM | BODY MASS INDEX: 26.31 KG/M2 | HEART RATE: 81 BPM | HEIGHT: 62 IN | WEIGHT: 143 LBS | OXYGEN SATURATION: 98 % | DIASTOLIC BLOOD PRESSURE: 56 MMHG

## 2019-10-01 PROCEDURE — 97530 THERAPEUTIC ACTIVITIES: CPT

## 2019-10-01 PROCEDURE — 97110 THERAPEUTIC EXERCISES: CPT

## 2019-10-01 PROCEDURE — 94799 UNLISTED PULMONARY SVC/PX: CPT

## 2019-10-01 PROCEDURE — 25000003 PHARM REV CODE 250: Performed by: PHYSICIAN ASSISTANT

## 2019-10-01 PROCEDURE — 99232 PR SUBSEQUENT HOSPITAL CARE,LEVL II: ICD-10-PCS | Mod: ,,, | Performed by: ANESTHESIOLOGY

## 2019-10-01 PROCEDURE — 94761 N-INVAS EAR/PLS OXIMETRY MLT: CPT

## 2019-10-01 PROCEDURE — 99232 SBSQ HOSP IP/OBS MODERATE 35: CPT | Mod: ,,, | Performed by: ANESTHESIOLOGY

## 2019-10-01 PROCEDURE — 97116 GAIT TRAINING THERAPY: CPT

## 2019-10-01 PROCEDURE — 25000003 PHARM REV CODE 250: Performed by: ANESTHESIOLOGY

## 2019-10-01 PROCEDURE — 63600175 PHARM REV CODE 636 W HCPCS: Performed by: PHYSICIAN ASSISTANT

## 2019-10-01 PROCEDURE — 97535 SELF CARE MNGMENT TRAINING: CPT | Mod: 59

## 2019-10-01 RX ADMIN — FAMOTIDINE 20 MG: 20 TABLET ORAL at 08:10

## 2019-10-01 RX ADMIN — CEFAZOLIN 2 G: 1 INJECTION, POWDER, FOR SOLUTION INTRAMUSCULAR; INTRAVENOUS at 05:10

## 2019-10-01 RX ADMIN — MUPIROCIN 1 G: 20 OINTMENT TOPICAL at 08:10

## 2019-10-01 RX ADMIN — ROPIVACAINE HYDROCHLORIDE 8 ML/HR: 2 INJECTION, SOLUTION EPIDURAL; INFILTRATION at 03:10

## 2019-10-01 RX ADMIN — POLYETHYLENE GLYCOL 3350 17 G: 17 POWDER, FOR SOLUTION ORAL at 09:10

## 2019-10-01 RX ADMIN — ASPIRIN 81 MG: 81 TABLET, COATED ORAL at 08:10

## 2019-10-01 RX ADMIN — ONDANSETRON 4 MG: 2 INJECTION INTRAMUSCULAR; INTRAVENOUS at 03:10

## 2019-10-01 RX ADMIN — SENNOSIDES AND DOCUSATE SODIUM 1 TABLET: 8.6; 5 TABLET ORAL at 08:10

## 2019-10-01 RX ADMIN — CELECOXIB 200 MG: 100 CAPSULE ORAL at 08:10

## 2019-10-01 RX ADMIN — OXYCODONE HYDROCHLORIDE 5 MG: 5 TABLET ORAL at 09:10

## 2019-10-01 RX ADMIN — OXYCODONE HYDROCHLORIDE 10 MG: 10 TABLET ORAL at 05:10

## 2019-10-01 NOTE — PROGRESS NOTES
[facility] Progress Note    HPI  Celestine Diallo is a 73 y.o., female    Interval history      Surgery:  Procedure(s) (LRB):  ARTHROPLASTY, KNEE, TOTAL (Right)    Post Op Day #: 1    Catheter type: Perineural Adductor Canal    Infusion type: Ropivacaine 0.2%  8 ml/hr basal    Problem List:    Active Hospital Problems    Diagnosis  POA    *Primary osteoarthritis of right knee [M17.11]  Yes      Resolved Hospital Problems   No resolved problems to display.       Subjective:       General appearance of alert, oriented, no complaints   Pain with rest: 1    Numbers   Pain with movement: 1    Numbers   Side Effects    1. Pruritis No    2. Nausea No    3. Motor Blockade Yes, 0=Ability to raise lower extremities off bed    4. Sedation No, 1=awake and alert    Schedule Medications:    aspirin  81 mg Oral BID    ceFAZolin (ANCEF) IVPB  2 g Intravenous Q8H    celecoxib  200 mg Oral Daily    famotidine  20 mg Oral BID    mupirocin  1 g Nasal BID    polyethylene glycol  17 g Oral Daily    pregabalin  75 mg Oral QHS    senna-docusate 8.6-50 mg  1 tablet Oral BID        Continuous Infusions:   sodium chloride 0.9% 150 mL/hr at 09/30/19 1545    ropivacaine (PF) 2 mg/ml (0.2%) 8 mL/hr (10/01/19 0320)    ropivacaine          PRN Medications:  bisacodyl, morphine, morphine, naloxone, ondansetron, oxyCODONE, oxyCODONE, promethazine (PHENERGAN) IVPB, ramelteon, ropivacaine       Antibiotics:  Antibiotics (From admission, onward)    Start     Stop Route Frequency Ordered    09/30/19 2115  ceFAZolin injection 2 g      10/01 1314 IV Every 8 hours (non-standard times) 09/30/19 1510    09/30/19 2100  mupirocin 2 % ointment 1 g      10/05 2059 Nasl 2 times daily 09/30/19 1746             Objective:     Catheter site clean, dry, intact          Vital Signs (Most Recent):  Temp: 97.6 °F (36.4 °C) (10/01/19 0400)  Pulse: 88 (10/01/19 0400)  Resp: 18 (10/01/19 0400)  BP: (!) 110/54 (10/01/19  0016)  SpO2: 98 % (10/01/19 0400) Vital Signs Range (Last 24H):  Temp:  [96.3 °F (35.7 °C)-98.4 °F (36.9 °C)]   Pulse:  [63-88]   Resp:  [14-20]   BP: (104-156)/(54-96)   SpO2:  [95 %-100 %]          I & O (Last 24H):    Intake/Output Summary (Last 24 hours) at 10/1/2019 0527  Last data filed at 9/30/2019 1504  Gross per 24 hour   Intake 1900 ml   Output --   Net 1900 ml       Physical Exam:    GA: Alert, comfortable, no acute distress.   Pulmonary: Clear to auscultation A/P/L. No wheezing, crackles, or rhonchi.  Cardiac: RRR S1 & S2 w/o rubs/murmurs/gallops.   Abdominal:Bowel sounds present. No tenderness to palpation or distension. No appreciable hepatosplenomegaly.   Skin: No jaundice, rashes, or visible lesions.      Anticoagulant dose 81mg ASA at 2156.    Assessment:         Pain control adequate    Plan:     1) Pain:    Adductor canal perineural catheter in place and infusing. Good level. Multimodal pain regimen with acetaminophen, Celebrex, Lyrica, and prn oxycodone given  Will continue to monitor. Plan to D/C perineural catheter in AM or home with On-Q if patient discharged today.   2) FEN/GI: Tolerating liquids, advance diet as tolerated. (+) flatus. (-) BM.   3) Dispo: Pt working well with PT/OT. Case management and SW for placement. Plan for D/C  today if patient works well with PT and meets goals.      Evaluator Sonal Gordon NP   I have seen the patient, reviewed the Nurse Practitioner's assessment and plan. I have personally interviewed and examined the patient at bedside and: agree with the findings.   Pain well controlled with multimodals; d/c home today with ONQ  Tolerating po well  Meeting PT/OT goals  D/c home today

## 2019-10-01 NOTE — DISCHARGE SUMMARY
[facility]  Orthopedics  Discharge Summary      Patient Name: Celestine Diallo  MRN: [id number]  Admission Date: 9/30/2019  Hospital Length of Stay: 1 days  Discharge Date and Time:  10/01/2019 7:59 AM  Attending Physician: [facility] Jr., MD   Discharging Provider: Andria Jean MD  Primary Care Provider: Son SHAR MD Madeline    HPI:   Celestine Diallo is a 73 y.o. female with right knee osteoarthritis.    Procedure(s) (LRB):  ARTHROPLASTY, KNEE, TOTAL (Right)      Hospital Course:  Patient presented for above procedure.  Tolerated it well and was discharged home POD1 after voiding, tolerating diet, ambulating, pain controlled.  Discharge instructions, follow-up appointment, and med rec are below.      Consults (From admission, onward)        Status Ordering Provider     Inpatient consult to Pain Management  Once     Provider:  (Not yet assigned)    Acknowledged GEOVANY ISAAC     Inpatient consult to Respiratory Care  Once     Provider:  (Not yet assigned)    Acknowledged GEOVANY ISAAC     Inpatient consult to Social Work  Once     Provider:  (Not yet assigned)    Acknowledged GEOVANY ISAAC          Significant Diagnostic Studies: No pertinent studies.    Pending Diagnostic Studies:     Procedure Component Value Units Date/Time    X-Ray Knee 1 or 2 View Right [[id number]] Resulted:  09/30/19 1536    Order Status:  Sent Lab Status:  In process Updated:  09/30/19 1552        Final Active Diagnoses:    Diagnosis Date Noted POA    PRINCIPAL PROBLEM:  Primary osteoarthritis of right knee [M17.11] 09/30/2019 Yes      Problems Resolved During this Admission:      Discharged Condition: good    Disposition: [facility] [place]    Follow Up:  Follow-up Information     Radha Ruano NP On 10/15/2019.    Specialty:  Orthopedic Surgery  Why:  For wound re-check  Contact information:  Marianne HICKEY YOSELIN  [facility] [id number] [phone]                 Patient Instructions:      Activity as  tolerated     Call MD for:  difficulty breathing, headache or visual disturbances     Call MD for:  extreme fatigue     Call MD for:  hives     Call MD for:  persistent dizziness or light-headedness     Call MD for:  persistent nausea and vomiting     Call MD for:  redness, tenderness, or signs of infection (pain, swelling, redness, odor or green/yellow discharge around incision site)     Call MD for:  severe uncontrolled pain     Call MD for:  temperature >100.4     Keep surgical extremity elevated     Leave dressing on - Keep it clean, dry, and intact until clinic visit   Order Comments: Do not remove surgical dressing for 2 weeks post-op. This will be done only by MD at initial post-op visit. If dressing is completely saturated, replace with identical dressing - silver-impregnated hydrocolloid dressing.     Do not get dressings wet. Do not shower.     If dressing continues to be saturated or there are signs of infection, please call [facility] [phone] for further instructions and to make appt to be seen.     Lifting restrictions   Order Comments: No strenuous exercise or lifting of > 10 lbs     No driving, operating heavy equipment or signing legal documents while taking pain medication     Sponge bath only until clinic visit     Weight bearing as tolerated     Medications:  Reconciled Home Medications:      Medication List      CONTINUE taking these medications    acetaminophen 650 MG Tbsr  Commonly known as:  TYLENOL  Take 1 tablet (650 mg total) by mouth every 8 (eight) hours as needed.     ALEVE 220 mg Cap  Generic drug:  naproxen sodium  Take 1 tablet by mouth.     aspirin 81 MG EC tablet  Commonly known as:  ECOTRIN  Take 1 tablet (81 mg total) by mouth 2 (two) times daily.     FLUZONE HIGH-DOSE 2014-15 (PF) 180 mcg/0.5 mL Syrg  Generic drug:  flu vac ts2014-15(65yr,up)(PF)     multivitamin with minerals tablet  Take 1 tablet by mouth once daily.     oxyCODONE 5 MG immediate release tablet  Commonly  known as:  ROXICODONE  Take 1 tablet (5 mg total) by mouth every 6 (six) hours as needed for Pain.     STOOL SOFTENER 100 MG capsule  Generic drug:  docusate sodium  Take 1 capsule (100 mg total) by mouth 2 (two) times daily as needed for Constipation.        STOP taking these medications    diclofenac sodium 1 % Gel  Commonly known as:  LAUREL Jean MD  Orthopedics  [facility]

## 2019-10-01 NOTE — PT/OT/SLP EVAL
Occupational Therapy   Evaluation    Name: Celestine Diallo  MRN: [id number]  Admitting Diagnosis:  Primary osteoarthritis of right knee Day of Surgery    Recommendations:     Discharge Recommendations: home health OT  Discharge Equipment Recommendations:  walker, rolling, commode  Barriers to discharge:  Decreased caregiver support    Assessment:     Celestine Diallo is a 73 y.o. female with a medical diagnosis of Primary osteoarthritis of right knee.  She was able to perform supine/sit, sit/stand, and walk to door c CGA and RW.  B UE are WFL.  Able to perform UB dressing c total assist.  Pt c c/o nausea throughout assessment.  Is progressing well.  Performance deficits affecting function: impaired self care skills, impaired functional mobilty, orthopedic precautions.      Rehab Prognosis: Good; patient would benefit from acute skilled OT services to address these deficits and reach maximum level of function.       Plan:     Patient to be seen daily to address the above listed problems via self-care/home management, therapeutic activities, therapeutic exercises  · Plan of Care Expires: 10/05/19  · Plan of Care Reviewed with: patient    Subjective     Chief Complaint: Pt is s/p R TKA and is WBAT R LE  Patient/Family Comments/goals: To get better.    Occupational Profile:  Living Environment: Pt lives in 2 story house and has first floor access to bedroom and bathroom.  Has 2 COURTNEY and no rails on steps.  Has a walk-in shower.    Previous level of function: I PTA  Equipment Used at Home:  cane, straight, crutches, shower chair  Assistance upon Discharge: Pt lives alone but has a son that can check in on her at times.    Pain/Comfort:  · Pain Rating 1: (Unable to rate)    Patients cultural, spiritual, [employer]ian conflicts given the current situation: no    Objective:     Communicated with: RN prior to session.  Patient found supine with cryotherapy, perineural catheter, peripheral IV upon OT entry to room.    General Precautions:  Standard, fall   Orthopedic Precautions:RLE weight bearing as tolerated   Braces: N/A     Occupational Performance:    Bed Mobility:    · Patient completed Supine to Sit with contact guard assistance  · Patient completed Sit to Supine with contact guard assistance    Functional Mobility/Transfers:  · Patient completed Sit <> Stand Transfer with contact guard assistance  with  rolling walker   · Functional Mobility: Pt was able to walk to door c CGA and RW.  Pt c c/o nausea during assessment.    Activities of Daily Living:  · Upper Body Dressing: total assistance to don hospital gown.    Cognitive/Visual Perceptual:  Cognitive/Psychosocial Skills:     -       Oriented to: Person, Place, Time and Situation   -       Follows Commands/attention:Follows multistep  commands    Physical Exam:  Upper Extremity Range of Motion:     -       Right Upper Extremity: WFL  -       Left Upper Extremity: WFL  Upper Extremity Strength:    -       Right Upper Extremity: WFL  -       Left Upper Extremity: WFL    AMPAC 6 Click ADL:  AMPAC Total Score: 14      Education:    Patient left supine with all lines intact, call button in reach and RN notified    GOALS:   Multidisciplinary Problems     Occupational Therapy Goals        Problem: Occupational Therapy Goal    Goal Priority Disciplines Outcome Interventions   Occupational Therapy Goal     OT, PT/OT Ongoing, Progressing    Description:  Goals to be met by: 10/5/19     Patient will increase functional independence with ADLs by performing:    UE Dressing with [place].  LE Dressing with [place].  Grooming while standing at sink with Modified [place].  Toileting from bedside commode with Modified [place] for hygiene and clothing management.   Bathing from  shower chair/bench with Modified [place].  Toilet transfer to bedside commode with Modified [place].  Increased functional strength to WFL for B UE.  Upper extremity exercise program x15 reps per handout,  with independence.                      History:     Past Medical History:   Diagnosis Date    Arthritis        Past Surgical History:   Procedure Laterality Date    CYST REMOVAL Left 2012    foot    THYROIDECTOMY  2005    THYROIDECTOMY, PARTIAL  1980       Time Tracking:     OT Date of Treatment: 09/30/19  OT Start Time: 1727  OT Stop Time: 1747  OT Total Time (min): 20 min    Billable Minutes:Evaluation 10  Therapeutic Activity 10    LOYDA Paulino  9/30/2019

## 2019-10-01 NOTE — PLAN OF CARE
Patient tolerated PT session fairly well today. She ambulated with RW and SBA for 30ft, 30ft, 40ft, and 90ft with seated rest break between trials due to B UE fatigue and nausea. She ascended/descended 1 curb step with RW and CGA. She performed R LE therex x10 reps. RN notified that patient's son concerned about discharge because she looks pale.     Problem: Physical Therapy Goal  Goal: Physical Therapy Goal  Description  Goals to be met by: 10/7/19    Patient will increase functional independence with mobility by performin. Supine to sit with supervision  2. Sit to supine with supervision  3. Sit to stand transfer with Supervision  4. Gait x200 feet with Supervision using Rolling Walker  5. Ascend/Descend 2 steps with Contact Guard Assistance using bilateral handrails   6. Lower extremity exercise program x30 reps per handout, with supervision         Outcome: Ongoing, Progressing

## 2019-10-01 NOTE — PT/OT/SLP PROGRESS
Physical Therapy Treatment    Patient Name:  Celestine Diallo   MRN:  [id number]    Recommendations:     Discharge Recommendations:  home health PT   Discharge Equipment Recommendations: commode, walker, rolling   Barriers to discharge: son will assist at discharge    Assessment:     Celestine Diallo is a 73 y.o. female admitted with a medical diagnosis of Primary osteoarthritis of right knee.  She presents with the following impairments/functional limitations:  weakness, impaired endurance, gait instability, impaired balance, impaired functional mobilty, edema, pain, impaired skin, orthopedic precautions, impaired joint extensibility, decreased ROM. Patient tolerated PT session fairly well today. She ambulated with RW and SBA for 30ft, 30ft, 40ft, and 90ft with seated rest break between trials due to B UE fatigue and nausea. She ascended/descended 1 curb step with RW and CGA. She performed R LE therex x10 reps. RN notified that patient's son concerned about discharge because she looks pale.     Rehab Prognosis: Good; patient would benefit from acute skilled PT services to address these deficits and reach maximum level of function.    Recent Surgery: Procedure(s) (LRB):  ARTHROPLASTY, KNEE, TOTAL (Right) 1 Day Post-Op    Plan:     During this hospitalization, patient to be seen daily to address the identified rehab impairments via gait training, therapeutic activities, therapeutic exercises and progress toward the following goals:    · Plan of Care Expires:  10/07/19    Subjective     Chief Complaint: Nausea.   Patient/Family Comments/goals: To walk without pain.   Pain/Comfort:  · Pain Rating 1: (did not wait but stated it was tolerable )  · Location - Side 1: Right  · Location 1: knee  · Pain Addressed 1: Pre-medicate for activity, Reposition, Distraction, Cessation of Activity, Nurse notified      Objective:     Communicated with RN prior to session.  Patient found up in chair with cryotherapy, perineural catheter upon PT  "entry to room. Son and daughter present throughout PT session.     General Precautions: Standard, fall   Orthopedic Precautions:RLE weight bearing as tolerated   Braces: N/A     Functional Mobility:  · Mat Mobility:     · Supine to Sit: stand by assistance  · Sit to Supine: stand by assistance  · Transfers:     · Sit to Stand:  stand by assistance with rolling walker x1 from bedside chair, x1 from mat, and x3 from wheelchair with verbal/tactile cues for technique and hand placement   · Chair to Mat: stand by assistance with rolling walker for patient to take ~5 steps with verbal cues for technique and RW management   · Gait: She ambulated with RW and SBA for 30ft, 30ft, 40ft, and 90ft with seated rest break between trials due to B UE fatigue and nausea. Patient demonstrated decreased erasmo, decreased velocity of limb motion, decreased heel strike on R, and decreased step length during gait due to impaired balance, decreased flexibility, pain and decreased ROM.  · Stairs:  Pt ascended/descended 4" curb step with Rolling Walker with Contact Guard Assistance.       AM-PAC 6 CLICK MOBILITY  Turning over in bed (including adjusting bedclothes, sheets and blankets)?: 4  Sitting down on and standing up from a chair with arms (e.g., wheelchair, bedside commode, etc.): 4  Moving from lying on back to sitting on the side of the bed?: 4  Moving to and from a bed to a chair (including a wheelchair)?: 4  Need to walk in hospital room?: 4  Climbing 3-5 steps with a railing?: 3  Basic Mobility Total Score: 23       Therapeutic Activities and Exercises:  Patient educated in and performed R LE exercises x10 reps for ankle pumps, quad set, glute set, SAQ over bolster, heel slides, hip abd/add, SLR, and LAQ.    Patient educated in:  -PT role and POC  -safety with transfers including hand placement  -gait sequencing and RW management  -OOB activity to maximize recovery including ambulating every hour to hour and a half at home   -car " transfer  -stair training with RW (son and daughter present to observe)  -HEP for therex at home with handout provided       Patient left up in chair with all lines intact, call button in reach, RN notified and daughter and son present.    GOALS:   Multidisciplinary Problems     Physical Therapy Goals        Problem: Physical Therapy Goal    Goal Priority Disciplines Outcome Goal Variances Interventions   Physical Therapy Goal     PT, PT/OT Ongoing, Progressing     Description:  Goals to be met by: 10/7/19    Patient will increase functional independence with mobility by performin. Supine to sit with supervision  2. Sit to supine with supervision  3. Sit to stand transfer with Supervision  4. Gait x200 feet with Supervision using Rolling Walker  5. Ascend/Descend 2 steps with Contact Guard Assistance using bilateral handrails   6. Lower extremity exercise program x30 reps per handout, with supervision                          Time Tracking:     PT Received On: 10/01/19  PT Start Time: 1054     PT Stop Time: 1147  PT Total Time (min): 53 min     Billable Minutes: Gait Training 38 and Therapeutic Exercise 15    Treatment Type: Treatment  PT/PTA: PT       Yamilet Strickland, PT  10/01/2019

## 2019-10-01 NOTE — PROGRESS NOTES
"[facility]  Orthopedics  Progress Note    Patient Name: Celestine Diallo  MRN: [id number]  Admission Date: 9/30/2019  Hospital Length of Stay: 1 days  Attending Provider: [facility] L[facility] Jr., MD  Primary Care Provider: Alex Correa MD  Follow-up For: Procedure(s) (LRB):  ARTHROPLASTY, KNEE, TOTAL (Right)    Post-Operative Day: 1 Day Post-Op  Subjective:     Principal Problem:Primary osteoarthritis of right knee    Principal Orthopedic Problem: same    Interval History: AFVSS. Walked in room with PT. Complains of some anterior knee pain.     Review of patient's allergies indicates:  No Known Allergies    Current Facility-Administered Medications   Medication    0.9%  NaCl infusion    aspirin EC tablet 81 mg    bisacodyl suppository 10 mg    celecoxib capsule 200 mg    famotidine tablet 20 mg    morphine injection 2 mg    morphine injection 2 mg    mupirocin 2 % ointment 1 g    naloxone 0.4 mg/mL injection 0.02 mg    ondansetron injection 4 mg    oxyCODONE immediate release tablet 10 mg    oxyCODONE immediate release tablet 5 mg    polyethylene glycol packet 17 g    pregabalin capsule 75 mg    promethazine (PHENERGAN) 6.25 mg in dextrose 5 % 50 mL IVPB    ramelteon tablet 8 mg    ropivacaine (PF) 2 mg/ml (0.2%) infusion    ropivacaine 0.2% ON-Q C-BLOC 400 ML (SELECT A FLOW)    senna-docusate 8.6-50 mg per tablet 1 tablet     Objective:     Vital Signs (Most Recent):  Temp: 97.6 °F (36.4 °C) (10/01/19 0400)  Pulse: 88 (10/01/19 0400)  Resp: 18 (10/01/19 0400)  BP: (!) 110/54 (10/01/19 0016)  SpO2: 98 % (10/01/19 0400) Vital Signs (24h Range):  Temp:  [96.3 °F (35.7 °C)-98.4 °F (36.9 °C)] 97.6 °F (36.4 °C)  Pulse:  [63-88] 88  Resp:  [14-20] 18  SpO2:  [95 %-100 %] 98 %  BP: (104-156)/(54-96) 110/54     Weight: 64.9 kg (143 lb)  Height: 5' 2" (157.5 cm)  Body mass index is 26.16 kg/m².      Intake/Output Summary (Last 24 hours) at 10/1/2019 0619  Last data filed at 10/1/2019 " 0500  Gross per 24 hour   Intake 3550 ml   Output --   Net 3550 ml       Ortho/SPM Exam   HEENT: normocephalic, atraumatic  Resp: no increased work of breathing  CV: regular rate and rhythm  MSK: moves b/l upper extremities well    right lower extremity:  Aquacel c/d/i  Sensation intact SP/DP/T/Sural/Saphenous nerves  Motor intact EHL/FHL/TA/gastroc  Palpable DP/PT pulses      Significant Labs: All pertinent labs within the past 24 hours have been reviewed.    Significant Imaging: I have reviewed all pertinent imaging results/findings.    Assessment/Plan:     * Primary osteoarthritis of right knee  Celestine Diallo is a 73 y.o. female s/p R TKA  - Antibiotics: post-op Ancef  - Weight bearing status: wbat  - Labs: reviewed  - DVT Prophylaxis: mechanical, ASA 81 mg BID  - Lines/Drains: PIV, PNC  - Pain control: multimodal per anesthesia    Dispo: home with             Andria Jean MD  Orthopedics  [facility]

## 2019-10-01 NOTE — SUBJECTIVE & OBJECTIVE
"Principal Problem:Primary osteoarthritis of right knee    Principal Orthopedic Problem: same    Interval History: AFVSS. Walked in room with PT. Complains of some anterior knee pain.     Review of patient's allergies indicates:  No Known Allergies    Current Facility-Administered Medications   Medication    0.9%  NaCl infusion    aspirin EC tablet 81 mg    bisacodyl suppository 10 mg    celecoxib capsule 200 mg    famotidine tablet 20 mg    morphine injection 2 mg    morphine injection 2 mg    mupirocin 2 % ointment 1 g    naloxone 0.4 mg/mL injection 0.02 mg    ondansetron injection 4 mg    oxyCODONE immediate release tablet 10 mg    oxyCODONE immediate release tablet 5 mg    polyethylene glycol packet 17 g    pregabalin capsule 75 mg    promethazine (PHENERGAN) 6.25 mg in dextrose 5 % 50 mL IVPB    ramelteon tablet 8 mg    ropivacaine (PF) 2 mg/ml (0.2%) infusion    ropivacaine 0.2% ON-Q C-BLOC 400 ML (SELECT A FLOW)    senna-docusate 8.6-50 mg per tablet 1 tablet     Objective:     Vital Signs (Most Recent):  Temp: 97.6 °F (36.4 °C) (10/01/19 0400)  Pulse: 88 (10/01/19 0400)  Resp: 18 (10/01/19 0400)  BP: (!) 110/54 (10/01/19 0016)  SpO2: 98 % (10/01/19 0400) Vital Signs (24h Range):  Temp:  [96.3 °F (35.7 °C)-98.4 °F (36.9 °C)] 97.6 °F (36.4 °C)  Pulse:  [63-88] 88  Resp:  [14-20] 18  SpO2:  [95 %-100 %] 98 %  BP: (104-156)/(54-96) 110/54     Weight: 64.9 kg (143 lb)  Height: 5' 2" (157.5 cm)  Body mass index is 26.16 kg/m².      Intake/Output Summary (Last 24 hours) at 10/1/2019 0619  Last data filed at 10/1/2019 0500  Gross per 24 hour   Intake 3550 ml   Output --   Net 3550 ml       Ortho/SPM Exam   HEENT: normocephalic, atraumatic  Resp: no increased work of breathing  CV: regular rate and rhythm  MSK: moves b/l upper extremities well    right lower extremity:  Aquacel c/d/i  Sensation intact SP/DP/T/Sural/Saphenous nerves  Motor intact EHL/FHL/TA/gastroc  Palpable DP/PT " pulses      Significant Labs: All pertinent labs within the past 24 hours have been reviewed.    Significant Imaging: I have reviewed all pertinent imaging results/findings.

## 2019-10-01 NOTE — PLAN OF CARE
[facility]    HOME HEALTH ORDERS  FACE TO FACE ENCOUNTER    Patient Name: Celestine Diallo  YOB: 1945    PCP: Son SHAR MD Madeline   PCP Address: Kassie GAMEZ / [address]  PCP Phone Number: [phone]  PCP Fax: [phone]    Encounter Date: 10/01/2019    Admit to Home Health    Diagnoses:  Active Hospital Problems    Diagnosis  POA    *Primary osteoarthritis of right knee [M17.11]  Yes      Resolved Hospital Problems   No resolved problems to display.       Future Appointments   Date Time Provider Department Center   10/15/2019  2:00 PM Radha Ruano NP [facility]           I have seen and examined this patient face to face today. My clinical findings that support the need for the home health skilled services and home bound status are the following:  Weakness/numbness causing balance and gait disturbance due to Joint Replacement making it taxing to leave home.    Allergies:Review of patient's allergies indicates:  No Known Allergies    Diet: regular diet    Activities: activity as tolerated    Nursing:   SN to complete comprehensive assessment including routine vital signs. Instruct on disease process and s/s of complications to report to MD. Follow specific home health arthoplasty protocol. Review/verify medication list sent home with the patient at time of discharge  and instruct patient/caregiver as needed. If coumadin ordered, coumadin clinic to manage INR with INR draws 2x per week with a goal to maintain INR between 1.8 and 2.2. Frequency may be adjusted depending on start of care date.    Notify MD if SBP > 160 or < 90; DBP > 90 or < 50; HR > 120 or < 50; Temp > 101    Home Medical Equipment:  Walker, 3-1 bedside commode, transfer tub bench    CONSULTS:    Physical Therapy to evaluate and treat. Evaluate for home safety and equipment needs; Establish/upgrade home exercise program. Perform / instruct on therapeutic exercises, gait training, transfer  training, and Range of Motion.    OTHER:  Occupational Therapy to evaluate and treat. Evaluate home environment for safety and equipment needs. Perform/Instruct on transfers, ADL training, ROM, and therapeutic exercises.    MISCELLANEOUS CARE:  Routine Skin for Bedridden Patients: Instruct patient/caregiver to apply moisture barrier cream to all skin folds and wet areas in perineal area daily and after baths and all bowel movements.    WOUND CARE ORDERS  Do not remove surgical dressing for 2 weeks post-op. This will be done only by MD at initial post-op visit. If dressing is completely saturated, replace with identical dressing - silver-impregnated hydrocolloid dressing.     Do not get dressings wet. Do not shower.     If dressing continues to be saturated or there are signs of infection, please call [facility] [phone] for further instructions and to make appt to be seen.         Medications: Review discharge medications with patient and family and provide education.      Current Discharge Medication List      CONTINUE these medications which have NOT CHANGED    Details   acetaminophen (TYLENOL) 650 MG TbSR Take 1 tablet (650 mg total) by mouth every 8 (eight) hours as needed.  Qty: 120 tablet, Refills: 0    Associated Diagnoses: Status post total right knee replacement      aspirin (ECOTRIN) 81 MG EC tablet Take 1 tablet (81 mg total) by mouth 2 (two) times daily.  Qty: 60 tablet, Refills: 0    Associated Diagnoses: Status post total right knee replacement      docusate sodium (COLACE) 100 MG capsule Take 1 capsule (100 mg total) by mouth 2 (two) times daily as needed for Constipation.  Qty: 60 capsule, Refills: 0    Associated Diagnoses: Status post total right knee replacement      FLUZONE HIGH-DOSE 2014-15, PF, 180 mcg/0.5 mL Syrg       multivitamin with minerals tablet Take 1 tablet by mouth once daily.      naproxen sodium (ALEVE) 220 mg Cap Take 1 tablet by mouth.      oxyCODONE (ROXICODONE) 5 MG  immediate release tablet Take 1 tablet (5 mg total) by mouth every 6 (six) hours as needed for Pain.  Qty: 30 tablet, Refills: 0    Comments: Quantity prescribed more than 7 day supply? Yes, quantity medically necessary [facility]/30/2019  Associated Diagnoses: Status post total right knee replacement         STOP taking these medications       diclofenac sodium (VOLTAREN) 1 % Gel Comments:   Reason for Stopping:               I certify that this patient is confined to her home and needs physical therapy and occupational therapy.

## 2019-10-01 NOTE — PLAN OF CARE
Problem: Occupational Therapy Goal  Goal: Occupational Therapy Goal  Description  Goals to be met by: 10/5/19     Patient will increase functional independence with ADLs by performing:    UE Dressing with [place].  LE Dressing with [place].  Grooming while standing at sink with Modified [place].  Toileting from bedside commode with Modified [place] for hygiene and clothing management.   Bathing from  shower chair/bench with Modified [place].  Toilet transfer to bedside commode with Modified [place].  Increased functional strength to WFL for B UE.  Upper extremity exercise program x15 reps per handout, with independence.     Outcome: Ongoing, Progressing

## 2019-10-01 NOTE — PT/OT/SLP PROGRESS
Occupational Therapy   Treatment/Discharge Note    Name: Celestine Diallo  MRN: [id number]  Admitting Diagnosis:  Primary osteoarthritis of right knee  1 Day Post-Op    Recommendations:     Discharge Recommendations: home health OT  Discharge Equipment Recommendations:  commode, walker, rolling  Barriers to discharge:  Decreased caregiver support    Assessment:     Celestine Diallo is a 73 y.o. female with a medical diagnosis of Primary osteoarthritis of right knee.  She was able to perform sit/stand T/F and walk from bathroom to chair c SBA and RW. Able to perform UB dressing c mod I and LB dressing c min A.  Educated pt on bathing, car T/F's, and polar therapy.  Pt is progressing well.  Performance deficits affecting function are impaired self care skills, impaired functional mobilty, orthopedic precautions.     Rehab Prognosis:  Good; patient would benefit from acute skilled OT services to address these deficits and reach maximum level of function.       Plan:     Patient to be seen daily to address the above listed problems via self-care/home management, therapeutic activities, therapeutic exercises  · Plan of Care Expires: 10/05/19  · Plan of Care Reviewed with: patient, family    Subjective     Pain/Comfort:  · Pain Rating 1: (Pt did not rate)    Objective:     Communicated with: RN prior to session.  Patient found RN walking from bathroom to chair. with cryotherapy, perineural catheter upon OT entry to room.    General Precautions: Standard, fall   Orthopedic Precautions:RLE weight bearing as tolerated   Braces: N/A     Occupational Performance:         Functional Mobility/Transfers:  · Patient completed Sit <> Stand Transfer with stand by assistance  with  rolling walker   · Patient completed Toilet Transfer Stand Pivot technique with stand by assistance with  rolling walker per pt report.  · Functional Mobility: Pt was able to walk from bathroom to chair c SBA and RW.    Activities of Daily Living:  · Upper Body  Dressing: modified independence to don shirt.  · Lower Body Dressing: modified independence to don pants and slip on shoes.  Pt required min A to doff socks.  · Toileting: modified independence for toilet hygiene per pt report.      [facility] 6 Click ADL: 23    Treatment & Education:  Educated pt on bathing and car T/F's.    Patient left up in chair with all lines intact, call button in reach, RN notified and family presentEducation:      GOALS:   Multidisciplinary Problems     Occupational Therapy Goals     Not on file          Multidisciplinary Problems (Resolved)        Problem: Occupational Therapy Goal    Goal Priority Disciplines Outcome Interventions   Occupational Therapy Goal   (Resolved)     OT, PT/OT Met    Description:  Goals to be met by: 10/5/19     Patient will increase functional independence with ADLs by performing:    UE Dressing with [place].  LE Dressing with [place].  Grooming while standing at sink with Modified [place].  Toileting from bedside commode with Modified [place] for hygiene and clothing management.   Bathing from  shower chair/bench with Modified [place].  Toilet transfer to bedside commode with Modified [place].  Increased functional strength to WFL for B UE.  Upper extremity exercise program x15 reps per handout, with independence.                      Time Tracking:     OT Date of Treatment: 10/01/19  OT Start Time: 0930  OT Stop Time: 1005  OT Total Time (min): 35 min    Billable Minutes:Self Care/Home Management 20  Therapeutic Activity 15    LOYDA Paulino  10/1/2019

## 2019-10-01 NOTE — PLAN OF CARE
This CM Manager and Unit Celestine Emerald discussed home health with the patient and patient's son. Family requested [facility].  Per Lindsay with [facility], they would not be able to service the patient due to the patient not having an [facility] doctor and also due to the payor.  Information explained to patient and son.  Facesheet, home health orders, pt/ot notes, and d/c summary [facility]ed to [facility]. [facility] set up per Rubio.  Family updated on home health agency acceptance.

## 2019-10-01 NOTE — PLAN OF CARE
Problem: Occupational Therapy Goal  Goal: Occupational Therapy Goal  Description  Goals to be met by: 10/5/19     Patient will increase functional independence with ADLs by performing:    UE Dressing with [place].  LE Dressing with [place].  Grooming while standing at sink with Modified [place].  Toileting from bedside commode with Modified [place] for hygiene and clothing management.   Bathing from  shower chair/bench with Modified [place].  Toilet transfer to bedside commode with Modified [place].  Increased functional strength to WFL for B UE.  Upper extremity exercise program x15 reps per handout, with independence.     Outcome: Met

## 2019-10-01 NOTE — PT/OT/SLP EVAL
Physical Therapy Evaluation and Treatment    Patient Name:  Celestine Diallo   MRN:  [id number]    Recommendations:     Discharge Recommendations:  home health PT   Discharge Equipment Recommendations: commode, walker, rolling   Barriers to discharge: none    Assessment:     Celestine iDallo is a 73 y.o. female admitted with a medical diagnosis of Primary osteoarthritis of right knee.  She presents with the following impairments/functional limitations:  weakness, impaired functional mobilty, gait instability, impaired balance, decreased lower extremity function, decreased ROM, impaired joint extensibility, pain, impaired skin, orthopedic precautions, edema. Patient tolerated PT session fairly well. She ambulated 12ft with RW and CGA. She was limited in ambulation distance due to nausea. She is okay to ambulate with nursing staff assistance and RW.     Rehab Prognosis: Good; patient would benefit from acute skilled PT services to address these deficits and reach maximum level of function.    Recent Surgery: Procedure(s) (LRB):  ARTHROPLASTY, KNEE, TOTAL (Right) 1 Day Post-Op    Plan:     During this hospitalization, patient to be seen daily to address the identified rehab impairments via gait training, therapeutic activities, therapeutic exercises and progress toward the following goals:    · Plan of Care Expires:  10/07/19    Subjective     Chief Complaint: Nausea.   Patient/Family Comments/goals: To get back to PLOF.   Pain/Comfort:  ·   did not rate      Living Environment:  Patient lives alone in a 2SH but has access to a first floor bedroom/bathroom. She has 2 steps with no rail to get into the house. Prior to admission, patients level of function was independent for functional mobility. Equipment at home: cane, straight, crutches, shower chair. Upon discharge, patient will have assistance from son.    Objective:     Communicated with RN prior to session.  Patient found supine with cryotherapy, perineural catheter,  peripheral IV, FCD  upon PT entry to room.    General Precautions: Standard, fall   Orthopedic Precautions:RLE weight bearing as tolerated   Braces:   n/a    Exams:  · Cognitive Exam:  Patient is oriented to Person, Place, Time and Situation  · Sensation:    · -       Intact  · RLE ROM: WFL except limited at knee due to recent surgery   · RLE Strength: appears WFL but unable to formally assess due to pain   · LLE ROM: WFL  · LLE Strength: WFL    Functional Mobility:  · Bed Mobility:     · Supine to Sit: contact guard assistance  · Sit to Supine: contact guard assistance  · Transfers:     · Sit to Stand:  contact guard assistance with rolling walker  · Chair to Bed: contact guard assistance with  rolling walker  · Gait: She ambulated 12ft with RW and CGA. No LOB or SOB noted. She was limited in ambulation distance due to nausea.       Therapeutic Activities and Exercises:  Patient educated in:  -PT role and POC  -safety with transfers including hand placement  -gait sequencing and RW management  -OOB activity to maximize recovery       Patient left supine with all lines intact, call button in reach and RN notified.    GOALS:   Multidisciplinary Problems     Physical Therapy Goals        Problem: Physical Therapy Goal    Goal Priority Disciplines Outcome Goal Variances Interventions   Physical Therapy Goal     PT, PT/OT Ongoing, Progressing     Description:  Goals to be met by: 10/7/19    Patient will increase functional independence with mobility by performin. Supine to sit with supervision  2. Sit to supine with supervision  3. Sit to stand transfer with Supervision  4. Gait x200 feet with Supervision using Rolling Walker  5. Ascend/Descend 2 steps with Contact Guard Assistance using bilateral handrails   6. Lower extremity exercise program x30 reps per handout, with supervision                          History:     Past Medical History:   Diagnosis Date    Arthritis        Past Surgical History:   Procedure  Laterality Date    CYST REMOVAL Left 2012    foot    THYROIDECTOMY  2005    THYROIDECTOMY, PARTIAL  1980       Time Tracking:     PT Received On: 09/30/19  PT Start Time: 1730     PT Stop Time: 1750  PT Total Time (min): 20 min     Billable Minutes: Evaluation 12 and Gait Training 8      Yamilet Strickland, PT  10/01/2019

## 2019-10-01 NOTE — NURSING
Discharge instructions given to pt. Ok to discharge home per anesthesia. Pt son at bedside, pt awake, alert oriented. Rolling walker and bedside commode given to pt and pt son. On q ball pain teaching completed with pt son

## 2019-10-01 NOTE — ASSESSMENT & PLAN NOTE
Celestine Diallo is a 73 y.o. female s/p R TKA  - Antibiotics: post-op Ancef  - Weight bearing status: wbat  - Labs: reviewed  - DVT Prophylaxis: mechanical, ASA 81 mg BID  - Lines/Drains: PIV, PNC  - Pain control: multimodal per anesthesia    Dispo: home with

## 2019-10-01 NOTE — PLAN OF CARE
Patient A/Ox4.  Patient instructed to call for assistance. Call light in reach. Patient in bed resting at this time. Bed locked and in the lowest positionQ2 hour rounds made. Noted 20 g to left hand infusing NS at 150, site CDI. Polar Ice in place, place on the left knee. Ropivacaine infusing 8ml/hr. Site CDI. Dressing to the left knee is CDI.   Pain managed with prn pain medication. Safety maintained, no fall noted. Walker and commode at the bedside. Patient up to the restroom as tolerated.Will continue to observe and follow the current plan of care.

## 2019-10-01 NOTE — OP NOTE
DATE OF PROCEDURE:  09/30/2019    SURGEON:  John [facility]wood [facility], Jr., M.D.    ASSISTANT:  Andria Jean M.D. (RES)    OPERATION PERFORMED:  Right total knee arthroplasty.    PREOPERATIVE DIAGNOSIS:  Osteoarthritis, right knee.    POSTOPERATIVE DIAGNOSIS:  Osteoarthritis, right knee.    COMPONENTS USED:  Max Persona knee system.  We used a size 5 narrow femur,   size C tibia, a 10 mm posterior stabilized, vitamin E, highly cross-linked poly   insert and a 32 mm patella.    ESTIMATED BLOOD LOSS:  100 mL.    SPECIMEN:  Resected bone and tissue sent to Pathology for routine examination.    OPERATIVE TECHNIQUE:  The patient was placed supine on the operating table.    Spinal anesthesia had been introduced.  The right leg was prepped and draped in   sterile fashion.  The patient was given preoperative antibiotics and the OR team   wore the sterile exhaust suits in order to minimize risk for infection.  A foot   pump was placed on the left foot to help prevent DVT.  The right leg was   exsanguinated and the tourniquet was inflated to 300 pounds of pressure.  A   straight anterior incision was made.  Sharp dissection was carried down to the   gluteus calvin fibers.  Gluteus calvin fibers were split in line with the   orientation.  The patient had a valgus knee, so we did not release the medial   collateral.  We did release the iliotibial band and posterolateral capsule to   balance the knee.  The intramedullary guide was placed in the femur.  Distal cut   was made at 5 degrees of valgus on a +3 setting.  The proximal tibial cut was   performed removing about 4 to 5 from the medial side and about 6 to 7 from the   lateral side.  The femur measured for a 5 and we cut it posteriorly for a 5.    The flexion-extension gaps were balanced.  I went ahead and did the notch and   chamfer-plasty on the femur, sized the tibia to a C and drilled and prepared it   with the same rotation as the femur.  We did a trial reduction  with the   components.  It was a little bit tight and I took another 2 off the tibia.  We   then put the trial components in, cut the patella.  The patella was 24 mm and we   cut it about 16.  I then Pulsavac'd, dried the surfaces, cemented the tibial   baseplate, then the femoral component, removed the excess cement, put the 10 mm   insert in, put the knee out in extension and cemented the patella.  We then   bathed the knee in the Betadine solution.  When the cement was hard, I put the   actual 12 mm insert in.  Then, I closed the extensor mechanism with 1 Vicryl   over tranexamic acid and vancomycin powder, closed the subcutaneous tissues with   0 and 3-0 Vicryl and closed the skin with a running subcuticular 3-0 Monocryl   and skin adhesive.  Sterile surgical dressing was applied.  There were no   complications to the procedure.      KASANDRA  dd: 09/30/2019 16:39:51 (CDT)  td: 09/30/2019 19:58:47 (CDT)  Doc ID   #[id number]  Job ID #[id number]    CC:

## 2019-10-02 NOTE — PROGRESS NOTES
Spoke with patient's family member for On-Q assessment. Family member reports patient is sleeping comfortably and her pain is well controlled and she has no issues with the catheter at this time. Denies pt complaining of ringing in ears, leakage, redness, or displacement.  Reports the site is C/D/I. Family member has no questions at this time.

## 2019-10-03 ENCOUNTER — TELEPHONE (OUTPATIENT)
Dept: HOME HEALTH SERVICES | Facility: HOSPITAL | Age: 74
End: 2019-10-03

## 2019-10-03 PROCEDURE — G0180 MD CERTIFICATION HHA PATIENT: HCPCS | Mod: ,,, | Performed by: ORTHOPAEDIC SURGERY

## 2019-10-03 PROCEDURE — G0180 PR HOME HEALTH MD CERTIFICATION: ICD-10-PCS | Mod: ,,, | Performed by: ORTHOPAEDIC SURGERY

## 2019-10-03 NOTE — PROGRESS NOTES
10/3/19  Patient called at home.  Pain controlled with OnQ.  Patienact.  Discussed discontinuation of perineural catheter with patient and caretaker.  Patient instructed to take something for pain before removing catheter.  Catheter removed and blue tip in tact.  Site clean and dry per patient.  All questions answered.

## 2019-10-03 NOTE — PROGRESS NOTES
10/2/19--Patient/caretaker called at home.  Pain controlled with OnQ.  Patient denies signs of local anesthetic toxicity.  Dressing clean , dry, and intact.ed at home.  All questions answered.  Encouraged to call if any issues arise

## 2019-10-15 ENCOUNTER — OFFICE VISIT (OUTPATIENT)
Dept: ORTHOPEDICS | Facility: CLINIC | Age: 74
End: 2019-10-15
Payer: MEDICARE

## 2019-10-15 VITALS
HEIGHT: 62 IN | SYSTOLIC BLOOD PRESSURE: 120 MMHG | BODY MASS INDEX: 26.33 KG/M2 | DIASTOLIC BLOOD PRESSURE: 69 MMHG | WEIGHT: 143.06 LBS | HEART RATE: 81 BPM

## 2019-10-15 DIAGNOSIS — Z96.651 STATUS POST TOTAL RIGHT KNEE REPLACEMENT: ICD-10-CM

## 2019-10-15 PROCEDURE — 99999 PR PBB SHADOW E&M-EST. PATIENT-LVL III: CPT | Mod: PBBFAC,,, | Performed by: NURSE PRACTITIONER

## 2019-10-15 PROCEDURE — 99024 POSTOP FOLLOW-UP VISIT: CPT | Mod: S$GLB,,, | Performed by: NURSE PRACTITIONER

## 2019-10-15 PROCEDURE — 99999 PR PBB SHADOW E&M-EST. PATIENT-LVL III: ICD-10-PCS | Mod: PBBFAC,,, | Performed by: NURSE PRACTITIONER

## 2019-10-15 PROCEDURE — 99024 PR POST-OP FOLLOW-UP VISIT: ICD-10-PCS | Mod: S$GLB,,, | Performed by: NURSE PRACTITIONER

## 2019-10-15 RX ORDER — OXYCODONE HYDROCHLORIDE 5 MG/1
5 TABLET ORAL EVERY 6 HOURS PRN
Qty: 28 TABLET | Refills: 0 | Status: SHIPPED | OUTPATIENT
Start: 2019-10-15 | End: 2020-02-28

## 2019-10-15 NOTE — PROGRESS NOTES
"Celestine Diallo presents for initial post-operative visit following a right total knee arthroplasty performed by [facility]. [facility] on 9/30/2019. Tolerating pain medication well.      Exam:   Blood pressure 120/69, pulse 81, height 5' 2" (1.575 m), weight 64.9 kg (143 lb 1.3 oz).   Ambulating well with assistive device.  Incision is clean and dry without drainage or erythema. ROM:-5-90    Initial post-operative radiographs reviewed today revealing a well fixed and aligned prosthesis.    A/P:  2 weeks s/p right total knee replacement  Dr. [facility] interviewed and examined patient today and agrees with plan.   - The patient was advised to keep the incision clean and dry for the next 24 hours after which she may wash the area with antibacterial soap in the shower. Will not submerge until the incision is completely healed.   - Outpatient PT: orders entered. Pt requests [facility], but understands that she may have to go to another location   - Continue aspirin for 1 month from surgery.  - Pain medication refilled  - Follow up in 4 weeks with [facility]. [facility]. Pt will call clinic with problems/concerns.     "

## 2019-10-22 ENCOUNTER — EXTERNAL HOME HEALTH (OUTPATIENT)
Dept: HOME HEALTH SERVICES | Facility: HOSPITAL | Age: 74
End: 2019-10-22
Payer: MEDICARE

## 2019-10-23 ENCOUNTER — TELEPHONE (OUTPATIENT)
Dept: ORTHOPEDICS | Facility: CLINIC | Age: 74
End: 2019-10-23

## 2019-10-23 DIAGNOSIS — Z96.651 S/P TKR (TOTAL KNEE REPLACEMENT), RIGHT: Primary | ICD-10-CM

## 2019-10-23 NOTE — TELEPHONE ENCOUNTER
----- Message from Mary Upton MA sent at 10/23/2019 10:05 AM CDT -----  Contact: pt son/Erasmo  In your notes it says [facility], but I did not see the orders.   ----- Message -----  From: Gregory Delcid  Sent: 10/23/2019   9:40 AM CDT  To: [facility] Staff    Please call pt son at [phone]    Patient is waiting for the physical therapy orders to be sent    Thank you

## 2019-10-24 ENCOUNTER — CLINICAL SUPPORT (OUTPATIENT)
Dept: REHABILITATION | Facility: HOSPITAL | Age: 74
End: 2019-10-24
Payer: MEDICARE

## 2019-10-24 DIAGNOSIS — M25.561 CHRONIC PAIN OF RIGHT KNEE: ICD-10-CM

## 2019-10-24 DIAGNOSIS — R53.1 DECREASED STRENGTH: ICD-10-CM

## 2019-10-24 DIAGNOSIS — R26.89 IMPAIRED GAIT AND MOBILITY: ICD-10-CM

## 2019-10-24 DIAGNOSIS — G89.29 CHRONIC PAIN OF RIGHT KNEE: ICD-10-CM

## 2019-10-24 DIAGNOSIS — M25.661 DECREASED RANGE OF MOTION OF RIGHT KNEE: ICD-10-CM

## 2019-10-24 PROCEDURE — 97110 THERAPEUTIC EXERCISES: CPT

## 2019-10-24 PROCEDURE — 97161 PT EVAL LOW COMPLEX 20 MIN: CPT

## 2019-10-24 PROCEDURE — G8978 MOBILITY CURRENT STATUS: HCPCS | Mod: CK

## 2019-10-24 PROCEDURE — G8979 MOBILITY GOAL STATUS: HCPCS | Mod: CJ

## 2019-10-24 NOTE — PLAN OF CARE
[facility]  Physical Therapy Initial Evaluation    Name: Celestine Diallo  Clinic Number: [id number]    Therapy Diagnosis:   Encounter Diagnoses   Name Primary?    Chronic pain of right knee     Decreased range of motion of right knee     Decreased strength     Impaired gait and mobility      Physician: Radha Ruano NP    Physician Orders: PT Eval and Treat: S/p right knee replacement 9/30 by Dr. [facility]. Just finished home health and ready for outpatient PT  Medical Diagnosis from Referral: Z96.651 (ICD-10-CM) - S/P TKR (total knee replacement), right  Evaluation Date: 10/24/2019  Authorization Period Expiration: 12/31/19  Plan of Care Expiration: 12/20/19  Visit # / Visits authorized: 1/ 20    Time In: 1:05 pm   Time Out: 1:58 pm   Total Billable Time: 53 minutes    Precautions: Standard    Subjective   Date of onset: s/p (R) TKA on 9/30/19  History of current condition - [facility] reports: that she had (R) TKA on 9/30/19. Pt that she had home health PT which ended last week. Pt stated that she is still doing exercises from home health.      Medical History:   Past Medical History:   Diagnosis Date    Arthritis        Surgical History:   Celestine Diallo  has a past surgical history that includes Cyst Removal (Left, 2012); Thyroidectomy (2005); Thyroidectomy, partial (1980); Total knee arthroplasty (Right, 9/30/2019); and Joint replacement.    Medications:   Nhrajinder has a current medication list which includes the following prescription(s): acetaminophen, aspirin, docusate sodium, fluzone high-dose 2014-15 (pf), fluzone high-dose 2019-20 (pf), multivitamin with minerals, naproxen sodium, and oxycodone.    Allergies:   Review of patient's allergies indicates:  No Known Allergies     Imaging: see imaging section in pt chart review     Prior Therapy: home health PT until last week  Social History: Pt stated that she lives by herself. Pt stated that her son lives next door, so son comes over to  "help when needed.  Pt stated that she has two small steps to enter her bedroom. Pt stated that she does not have any steps to enter her home.   Occupation: Pt stated that she is retired.   Prior Level of Function: independent   Current Level of Function: ambulating with RW     Pain:  Current /10, worst 4/10, best 10   Location: right knee   Description: Sharp  Aggravating Factors: Standing, Walking, Getting out of bed/chair/toilet  Easing Factors: pain medication and ice    Pts goals: be able to be independent     Objective       Knee  Right  Left Pain/Dysfunction with Movement    AROM PROM AROM PROM NT = not tested   Flexion 110 114 140 NT    Extension 6 lacking 2 lacking 0 NT        L/E Strength w/ MicroFET Muscle Caitlin Dynamometer Right Left Pain/Dysfunction with Movement   (approx 4 sec hold w/ max contraction)   Hip Flexion 11.5 kg  19.5 kg     Hip Abduction 17.5 kg  19.4 kg     Quadriceps 13.5 kg  22.7 kg     Hamstrings 16.8 kg  25.9 kg       Incision: no sign of infection noted    Gait: pt ambulated with RW, decreased (R) TKE, decreased (B) step length    TU seconds w/RW    30 Second sit to stand test: 5 w/o UE support       CMS Impairment/Limitation/Restriction for FOTO knee Survey    Therapist reviewed FOTO scores for Celestine Diallo on 10/24/2019.   FOTO documents entered into [facility] - see Media section.    Limitation Score: 56%  Category: Mobility    Current : CK = at least 40% but < 60% impaired, limited or restricted  Goal: CJ = at least 20% but < 40% impaired, limited or restricted         TREATMENT   Treatment Time In: 1:37 pm   Treatment Time Out: 1:58 pm   Total Treatment time separate from Evaluation: 21 minutes    Celestine received therapeutic exercises to develop strength, ROM and flexibility for 21 minutes including:  Seated hamstring stretch 2' w/foot propped   gastroc stretch w/strap  Heel slides 5"x10 w/strap  Quad sets towel under ankle 5"x20        Home Exercises and Patient Education " Provided    Education provided:   - Pt was educated on icing (R) knee and elevating (R) LE. Pt verbalized understanding.   - HEP  Written Home Exercises Provided: yes.  Exercises were reviewed and Celestine was able to demonstrate them prior to the end of the session.  Celestine demonstrated good  understanding of the education provided.     See EMR under Patient Instructions for exercises provided 10/24/2019.    Assessment   Celestine is a 73 y.o. female referred to outpatient Physical Therapy with a medical diagnosis of S/P TKR (total knee replacement), right . Pt presents with c/o (R) knee pain, decreased (R) knee AROM, decreased (R) LE strength, impaired gait/balance, and decreased functional mobility. Pt will benefit from skilled PT to address the limitations listed above in order to return pt to her highest level of function with decreased pain and limitation.     Pt prognosis is Good.   Pt will benefit from skilled outpatient Physical Therapy to address the deficits stated above and in the chart below, provide pt/family education, and to maximize pt's level of independence.     Plan of care discussed with patient: Yes  Pt's spiritual, cultural and educational needs considered and patient is agreeable to the plan of care and goals as stated below:     Anticipated Barriers for therapy: pain     Medical Necessity is demonstrated by the following  History  Co-morbidities and personal factors that may impact the plan of care Co-morbidities:   n/a    Personal Factors:   no deficits     low   Examination  Body Structures and Functions, activity limitations and participation restrictions that may impact the plan of care Body Regions:   lower extremities    Body Systems:    ROM  strength  gait  transfers    Participation Restrictions:   None mentioned    Activity limitations:   Learning and applying knowledge  no deficits    General Tasks and Commands  no deficits    Communication  no deficits    Mobility  walking  driving (bike,  car, motorcycle)    Self care  no deficits    Domestic Life  shopping  doing house work (cleaning house, washing dishes, laundry)  assisting others    Interactions/Relationships  no deficits    Life Areas  no deficits    Community and Social Life  no deficits         high   Clinical Presentation stable and uncomplicated low   Decision Making/ Complexity Score: low     Goals:    Short Term Goals: 4 weeks    1. Pt will be independent with HEP supplement PT in improving functional mobility.  2. Pt will improve (R) LE strength to at least 75% of (L) LE strength as measured via MicroFet handheld dynamometer in order to improve functional mobility      Long Term Goals:  1. Pt will improve (R) LE strength to at least 90% of (L) LE strength as measured via MicroFet handheld dynamometer in order to improve functional mobility  2. Pt will improve (R) knee AROM to at least 0-120 degrees in order to improve gait and ability to perform ADLs  3. Pt will improve FOTO knee survey score to </= 35% limited in order to demo improved functional mobility  4. Pt will perform TUG in < 10 seconds without AD in order to demo improved gait speed  5. Pt will perform at least 14 sit to stands without UE support on 30 second sit to stand test in order to demo improved ability to perform transfers      Plan   Plan of care Certification: 10/24/2019 to 12/20/19    Outpatient Physical Therapy 2 times weekly for 8 weeks to include the following interventions: Electrical Stimulation Russian/NMES, Gait Training, Manual Therapy, Moist Heat/ Ice, Neuromuscular Re-ed, Patient Education, Self Care, Therapeutic Activites, Therapeutic Exercise and ASTYM, FDN, and modalities prn.     Farhana Duque, PT

## 2019-10-28 ENCOUNTER — CLINICAL SUPPORT (OUTPATIENT)
Dept: REHABILITATION | Facility: HOSPITAL | Age: 74
End: 2019-10-28
Payer: MEDICARE

## 2019-10-28 DIAGNOSIS — R53.1 DECREASED STRENGTH: ICD-10-CM

## 2019-10-28 DIAGNOSIS — R26.89 IMPAIRED GAIT AND MOBILITY: ICD-10-CM

## 2019-10-28 DIAGNOSIS — M25.561 CHRONIC PAIN OF RIGHT KNEE: ICD-10-CM

## 2019-10-28 DIAGNOSIS — M25.661 DECREASED RANGE OF MOTION OF RIGHT KNEE: ICD-10-CM

## 2019-10-28 DIAGNOSIS — G89.29 CHRONIC PAIN OF RIGHT KNEE: ICD-10-CM

## 2019-10-28 PROCEDURE — 97116 GAIT TRAINING THERAPY: CPT

## 2019-10-28 PROCEDURE — 97140 MANUAL THERAPY 1/> REGIONS: CPT

## 2019-10-28 PROCEDURE — 97110 THERAPEUTIC EXERCISES: CPT

## 2019-10-28 NOTE — PROGRESS NOTES
"  Physical Therapy Daily Treatment Note     Name: Celestine Diallo  Clinic Number: [id number]    Therapy Diagnosis:   Encounter Diagnoses   Name Primary?    Chronic pain of right knee     Decreased range of motion of right knee     Decreased strength     Impaired gait and mobility      Physician: Radha Ruano NP    Visit Date: 10/28/2019    Physician Orders: PT Eval and Treat: S/p right knee replacement 9/30 by Dr. [facility]. Just finished home health and ready for outpatient PT  Medical Diagnosis from Referral: Z96.651 (ICD-10-CM) - S/P TKR (total knee replacement), right  Evaluation Date: 10/24/2019  Authorization Period Expiration: 12/31/19  Plan of Care Expiration: 12/20/19  Visit # / Visits authorized: 2/ 20    Visit amount: 118.08  Total amount: 231.28  Gcode 2/10    Time In: 10:01 am  Time Out: 11:00 am   Total Billable Time: 54 minutes    Precautions: Standard    Subjective     Pt reports: a little pain in (R) knee prior to therapy session.   She was compliant with home exercise program.  Response to previous treatment: no adverse effects  Functional change: none    Pain: 2/10  Location: right knee      Objective     Celestine received the following manual therapy techniques:  for 10 minutes, including:  STM to (R) quad, hamstring, ITB, posterior knee    Celestine received therapeutic exercises to develop strength, ROM and flexibility for 39 minutes including:    Bike x 5'   Seated hamstring stretch 2' w/foot propped   gastroc stretch w/strap  Heel slides 5"x10 w/strap  Quad sets towel under ankle 5"x20  SLR 2x10   Hamstring curls RTB 2x10   LAQ 2x10 2" hold     Celestine participated in gait training for 10 minutes. The following activities were included:  Gait training with SPC in clinic w/CGA    Home Exercises Provided and Patient Education Provided     Education provided:   - Continue with HEP  - Instructed to continue to ambulate with RW. Pt verbalized understanding.     Written Home Exercises Provided: Patient " instructed to cont prior HEP.  Exercises were reviewed and Celestine was able to demonstrate them prior to the end of the session.  Celestine demonstrated good  understanding of the education provided.     See EMR under Patient Instructions for exercises provided initial evaluation.      Assessment     Pt received VC for proper technique/sequence when ambulating with SPC. Pt also received VC to increase (R) step length and for proper distance when placing SPC. Pt was able to ambulate with SPC without any LOB episodes.  Pt will continue to benefit from gait training with SPC. Pt tolerated therapy session without any adverse reactions. Pt reported no increased pain at the end of therapy session.   Celestine is progressing well towards her goals.   Pt prognosis is Good.     Pt will continue to benefit from skilled outpatient physical therapy to address the deficits listed in the problem list box on initial evaluation, provide pt/family education and to maximize pt's level of independence in the home and community environment.     Pt's spiritual, cultural and educational needs considered and pt agreeable to plan of care and goals.    Anticipated barriers to physical therapy: pain    Goals:     Short Term Goals: 4 weeks     1. Pt will be independent with HEP supplement PT in improving functional mobility.  2. Pt will improve (R) LE strength to at least 75% of (L) LE strength as measured via MicroFet handheld dynamometer in order to improve functional mobility        Long Term Goals:  1. Pt will improve (R) LE strength to at least 90% of (L) LE strength as measured via MicroFet handheld dynamometer in order to improve functional mobility  2. Pt will improve (R) knee AROM to at least 0-120 degrees in order to improve gait and ability to perform ADLs  3. Pt will improve FOTO knee survey score to </= 35% limited in order to demo improved functional mobility  4. Pt will perform TUG in < 10 seconds without AD in order to demo improved  gait speed  5. Pt will perform at least 14 sit to stands without UE support on 30 second sit to stand test in order to demo improved ability to perform transfers    Plan     Continue per POC, progress as tolerated    Farhana Duque, PT

## 2019-10-29 ENCOUNTER — TELEPHONE (OUTPATIENT)
Dept: HOME HEALTH SERVICES | Facility: HOSPITAL | Age: 74
End: 2019-10-29

## 2019-10-30 ENCOUNTER — CLINICAL SUPPORT (OUTPATIENT)
Dept: REHABILITATION | Facility: HOSPITAL | Age: 74
End: 2019-10-30
Payer: MEDICARE

## 2019-10-30 DIAGNOSIS — M25.561 CHRONIC PAIN OF RIGHT KNEE: ICD-10-CM

## 2019-10-30 DIAGNOSIS — M25.661 DECREASED RANGE OF MOTION OF RIGHT KNEE: ICD-10-CM

## 2019-10-30 DIAGNOSIS — R53.1 DECREASED STRENGTH: ICD-10-CM

## 2019-10-30 DIAGNOSIS — G89.29 CHRONIC PAIN OF RIGHT KNEE: ICD-10-CM

## 2019-10-30 DIAGNOSIS — R26.89 IMPAIRED GAIT AND MOBILITY: ICD-10-CM

## 2019-10-30 PROCEDURE — 97140 MANUAL THERAPY 1/> REGIONS: CPT

## 2019-10-30 PROCEDURE — 97116 GAIT TRAINING THERAPY: CPT

## 2019-10-30 NOTE — PROGRESS NOTES
"  Physical Therapy Daily Treatment Note     Name: Celestine Diallo  Clinic Number: [id number]    Therapy Diagnosis:   Encounter Diagnoses   Name Primary?    Chronic pain of right knee     Decreased range of motion of right knee     Decreased strength     Impaired gait and mobility      Physician: Radha Ruano NP    Visit Date: 10/30/2019    Physician Orders: PT Eval and Treat: S/p right knee replacement 9/30 by Dr. [facility]. Just finished home health and ready for outpatient PT  Medical Diagnosis from Referral: Z96.651 (ICD-10-CM) - S/P TKR (total knee replacement), right  Evaluation Date: 10/24/2019  Authorization Period Expiration: 12/31/19  Plan of Care Expiration: 12/20/19  Visit # / Visits authorized: 3/ 20    Visit amount: 57.44  Total amount: 288.72  Gcode 3/10    Time In: 10:03 am  Time Out: 10:59 am   Total Billable Time: 27 minutes    Precautions: Standard    Subjective     Pt reports: experiencing a little pain in (R) knee when ambulating.   She was compliant with home exercise program.  Response to previous treatment: no adverse effects  Functional change: none    Pain: 1/10  Location: right knee      Objective       Celestine received the following manual therapy techniques:  for 10 minutes, including:  STM to (R) quad, hamstring, ITB, posterior knee    Celestine received therapeutic exercises to develop strength, ROM and flexibility for 37 minutes including:    Bike x 5'   Seated hamstring stretch 2' w/foot propped   gastroc stretch w/strap  Heel slides 5"x10 w/strap  Quad sets towel under ankle 5"x20  SLR 2x10   SL hip abduction 2x10 R  Hamstring curls RTB 2x10   LAQ 2x10 2" hold     Celestine participated in gait training for 9 minutes. The following activities were included:  Gait training with SPC in clinic w/CGA      Home Exercises Provided and Patient Education Provided     Education provided:   - Continue with HEP  - Continue to ambulate with RW. Pt verbalized understanding.     Written Home Exercises " Provided: Patient instructed to cont prior HEP.  Exercises were reviewed and Celestine was able to demonstrate them prior to the end of the session.  Celestine demonstrated good  understanding of the education provided.     See EMR under Patient Instructions for exercises provided initial evaluation.      Assessment     Pt required VC for proper sequence when ambulating with SPC initially. Pt required intermittent VC to not place SPC too far in front of her. Pt tolerated therapy session without any adverse reactions.   Celestine is progressing well towards her goals.   Pt prognosis is Good.     Pt will continue to benefit from skilled outpatient physical therapy to address the deficits listed in the problem list box on initial evaluation, provide pt/family education and to maximize pt's level of independence in the home and community environment.     Pt's spiritual, cultural and educational needs considered and pt agreeable to plan of care and goals.    Anticipated barriers to physical therapy: pain    Goals:     Short Term Goals: 4 weeks     1. Pt will be independent with HEP supplement PT in improving functional mobility.  2. Pt will improve (R) LE strength to at least 75% of (L) LE strength as measured via MicroFet handheld dynamometer in order to improve functional mobility        Long Term Goals:  1. Pt will improve (R) LE strength to at least 90% of (L) LE strength as measured via MicroFet handheld dynamometer in order to improve functional mobility  2. Pt will improve (R) knee AROM to at least 0-120 degrees in order to improve gait and ability to perform ADLs  3. Pt will improve FOTO knee survey score to </= 35% limited in order to demo improved functional mobility  4. Pt will perform TUG in < 10 seconds without AD in order to demo improved gait speed  5. Pt will perform at least 14 sit to stands without UE support on 30 second sit to stand test in order to demo improved ability to perform transfers      Plan      Continue per POC, progress as tolerated     Farhana Duque, PT

## 2019-11-05 ENCOUNTER — CLINICAL SUPPORT (OUTPATIENT)
Dept: REHABILITATION | Facility: HOSPITAL | Age: 74
End: 2019-11-05
Payer: MEDICARE

## 2019-11-05 DIAGNOSIS — G89.29 CHRONIC PAIN OF RIGHT KNEE: ICD-10-CM

## 2019-11-05 DIAGNOSIS — M25.661 DECREASED RANGE OF MOTION OF RIGHT KNEE: ICD-10-CM

## 2019-11-05 DIAGNOSIS — M25.561 CHRONIC PAIN OF RIGHT KNEE: ICD-10-CM

## 2019-11-05 DIAGNOSIS — R53.1 DECREASED STRENGTH: ICD-10-CM

## 2019-11-05 DIAGNOSIS — R26.89 IMPAIRED GAIT AND MOBILITY: ICD-10-CM

## 2019-11-05 PROCEDURE — 97110 THERAPEUTIC EXERCISES: CPT

## 2019-11-05 NOTE — PROGRESS NOTES
"  Physical Therapy Daily Treatment Note     Name: Celestine Diallo  Clinic Number: [id number]    Therapy Diagnosis:   Encounter Diagnoses   Name Primary?    Chronic pain of right knee     Decreased range of motion of right knee     Decreased strength     Impaired gait and mobility      Physician: Radha Ruano NP    Visit Date: 11/5/2019    Physician Orders: PT Eval and Treat: S/p right knee replacement 9/30 by Dr. [facility]. Just finished home health and ready for outpatient PT  Medical Diagnosis from Referral: Z96.651 (ICD-10-CM) - S/P TKR (total knee replacement), right  Evaluation Date: 10/24/2019  Authorization Period Expiration: 12/31/19  Plan of Care Expiration: 12/20/19  Visit # / Visits authorized: 4/ 20    G code: 4/10  Visit amount: 60.64  Total amount: 350    Time In: 11:00 am  Time Out: 11:59 am   Total Billable Time: 25 minutes    Precautions: Standard    Subjective     Pt reports: her R knee is mainly hurting when she gets up after sitting for prolonged periods.   She was compliant with home exercise program.  Response to previous treatment: no adverse effects  Functional change: none    Pain: 1/10  Location: right knee      Objective       Celestine received the following manual therapy techniques:  for 10 minutes, including:  STM to (R) quad  Scar mobility   PF mobs     Celestine received therapeutic exercises to develop strength, ROM and flexibility for 40 minutes including:    Bike x 5'   Seated hamstring stretch 2' w/foot propped   gastroc stretch w/strap  Heel slides 5"x10 w/strap  Quad sets towel under ankle 5"x20  SLR 2x10   SL hip abduction 2x10 R  Hamstring curls RTB 2x10   LAQ 2x10 2" hold     Celestine participated in gait training for 9 minutes. The following activities were included:  Gait training with SPC in clinic w/CGA      Home Exercises Provided and Patient Education Provided     Education provided:   - Continue with HEP  - Scar mobility    Written Home Exercises Provided: Patient instructed " to cont prior HEP.  Exercises were reviewed and Celestine was able to demonstrate them prior to the end of the session.  Celestine demonstrated good  understanding of the education provided.     See EMR under Patient Instructions for exercises provided initial evaluation.      Assessment     Therapy session tolerated well today with no adverse reactions reported. Pt reported pain relief after manual treatments today. Pt educated on performing scar mobility at home and verbalized understanding. Pt tolerated therapy session without any adverse reactions.   Celestine is progressing well towards her goals.   Pt prognosis is Good.     Pt will continue to benefit from skilled outpatient physical therapy to address the deficits listed in the problem list box on initial evaluation, provide pt/family education and to maximize pt's level of independence in the home and community environment.     Pt's spiritual, cultural and educational needs considered and pt agreeable to plan of care and goals.    Anticipated barriers to physical therapy: pain    Goals:     Short Term Goals: 4 weeks     1. Pt will be independent with HEP supplement PT in improving functional mobility.  2. Pt will improve (R) LE strength to at least 75% of (L) LE strength as measured via MicroFet handheld dynamometer in order to improve functional mobility        Long Term Goals:  1. Pt will improve (R) LE strength to at least 90% of (L) LE strength as measured via MicroFet handheld dynamometer in order to improve functional mobility  2. Pt will improve (R) knee AROM to at least 0-120 degrees in order to improve gait and ability to perform ADLs  3. Pt will improve FOTO knee survey score to </= 35% limited in order to demo improved functional mobility  4. Pt will perform TUG in < 10 seconds without AD in order to demo improved gait speed  5. Pt will perform at least 14 sit to stands without UE support on 30 second sit to stand test in order to demo improved ability to  perform transfers      Plan     Continue per POC, progress as tolerated     Molly Rodriges, PT

## 2019-11-07 ENCOUNTER — CLINICAL SUPPORT (OUTPATIENT)
Dept: REHABILITATION | Facility: HOSPITAL | Age: 74
End: 2019-11-07
Payer: MEDICARE

## 2019-11-07 DIAGNOSIS — R53.1 DECREASED STRENGTH: ICD-10-CM

## 2019-11-07 DIAGNOSIS — G89.29 CHRONIC PAIN OF RIGHT KNEE: ICD-10-CM

## 2019-11-07 DIAGNOSIS — M25.561 CHRONIC PAIN OF RIGHT KNEE: ICD-10-CM

## 2019-11-07 DIAGNOSIS — M25.661 DECREASED RANGE OF MOTION OF RIGHT KNEE: ICD-10-CM

## 2019-11-07 DIAGNOSIS — R26.89 IMPAIRED GAIT AND MOBILITY: ICD-10-CM

## 2019-11-07 PROCEDURE — 97110 THERAPEUTIC EXERCISES: CPT

## 2019-11-07 PROCEDURE — 97140 MANUAL THERAPY 1/> REGIONS: CPT

## 2019-11-07 NOTE — PROGRESS NOTES
"  Physical Therapy Daily Treatment Note     Name: Celestine Diallo  Clinic Number: [id number]    Therapy Diagnosis:   Encounter Diagnoses   Name Primary?    Chronic pain of right knee     Decreased range of motion of right knee     Decreased strength     Impaired gait and mobility      Physician: Radha Ruano NP    Visit Date: 11/7/2019    Physician Orders: PT Eval and Treat: S/p right knee replacement 9/30 by Dr. [facility]. Just finished home health and ready for outpatient PT  Medical Diagnosis from Referral: Z96.651 (ICD-10-CM) - S/P TKR (total knee replacement), right  Evaluation Date: 10/24/2019  Authorization Period Expiration: 12/31/19  Plan of Care Expiration: 12/20/19  Visit # / Visits authorized: 5/ 20    G code: 5/10  Visit amount: 57.78  Total amount: 407.78    Time In: 11:01 am   Time Out: 11:56 am   Total Billable Time: 27 minutes    Precautions: Standard    Subjective     Pt reports: experiencing just a little pain in her (R) knee prior to therapy session.   She was compliant with home exercise program.  Response to previous treatment: no adverse effects  Functional change: none    Pain: 1/10  Location: right knee      Objective     Celestine received the following manual therapy techniques:  for 10 minutes, including:  STM to (R) quad, hamstring, gastroc, and ITB  (R) knee scar mobs    Celestine received therapeutic exercises to develop strength, ROM and flexibility for 40 minutes including:    Bike x 5'   Seated hamstring stretch 2' w/foot propped   gastroc stretch w/strap  Heel slides 5"x10 w/strap  Quad sets towel under ankle 5"x20 - not performed  SLR 2x10 - not performed  SL hip abduction 2x10 R - not performed  Hamstring curls RTB 2x10   LAQ 2x10 2" hold   Step ups 2x10 L2 R    (R) knee AROM = 0-120 degrees        Celestine participated in neuromuscular re-education activities  for 5 minutes. The following activities were included:  Steamboats 2x10 B     Home Exercises Provided and Patient Education " Provided     Education provided:   - HEP    Written Home Exercises Provided: yes.  Exercises were reviewed and Celestine was able to demonstrate them prior to the end of the session.  Celestine demonstrated good  understanding of the education provided.     See EMR under Patient Instructions for exercises provided 11/7/2019 and initial evaluation      Assessment     Pt demo improved (R) knee flexion and extension AROM compared to measurements taken on initial evaluation. Pt was able to tolerate all therex including progression to standing therex/activities without any adverse reactions.   Celestine is progressing well towards her goals.   Pt prognosis is Good.     Pt will continue to benefit from skilled outpatient physical therapy to address the deficits listed in the problem list box on initial evaluation, provide pt/family education and to maximize pt's level of independence in the home and community environment.     Pt's spiritual, cultural and educational needs considered and pt agreeable to plan of care and goals.    Anticipated barriers to physical therapy: pain    Goals:     Short Term Goals: 4 weeks     1. Pt will be independent with HEP supplement PT in improving functional mobility.  2. Pt will improve (R) LE strength to at least 75% of (L) LE strength as measured via MicroFet handheld dynamometer in order to improve functional mobility        Long Term Goals:  1. Pt will improve (R) LE strength to at least 90% of (L) LE strength as measured via MicroFet handheld dynamometer in order to improve functional mobility  2. Pt will improve (R) knee AROM to at least 0-120 degrees in order to improve gait and ability to perform ADLs  3. Pt will improve FOTO knee survey score to </= 35% limited in order to demo improved functional mobility  4. Pt will perform TUG in < 10 seconds without AD in order to demo improved gait speed  5. Pt will perform at least 14 sit to stands without UE support on 30 second sit to stand test in  order to demo improved ability to perform transfers    Plan     Continue per POC, progress standing therex/activities as tolerated    Farhana Duque, PT

## 2019-11-12 ENCOUNTER — OFFICE VISIT (OUTPATIENT)
Dept: ORTHOPEDICS | Facility: CLINIC | Age: 74
End: 2019-11-12
Payer: MEDICARE

## 2019-11-12 VITALS — BODY MASS INDEX: 25.44 KG/M2 | HEIGHT: 62 IN | WEIGHT: 138.25 LBS

## 2019-11-12 DIAGNOSIS — Z96.651 STATUS POST TOTAL RIGHT KNEE REPLACEMENT: Primary | ICD-10-CM

## 2019-11-12 PROCEDURE — 99024 POSTOP FOLLOW-UP VISIT: CPT | Mod: S$GLB,,, | Performed by: ORTHOPAEDIC SURGERY

## 2019-11-12 PROCEDURE — 99999 PR PBB SHADOW E&M-EST. PATIENT-LVL III: ICD-10-PCS | Mod: PBBFAC,,, | Performed by: ORTHOPAEDIC SURGERY

## 2019-11-12 PROCEDURE — 99999 PR PBB SHADOW E&M-EST. PATIENT-LVL III: CPT | Mod: PBBFAC,,, | Performed by: ORTHOPAEDIC SURGERY

## 2019-11-12 PROCEDURE — 99024 PR POST-OP FOLLOW-UP VISIT: ICD-10-PCS | Mod: S$GLB,,, | Performed by: ORTHOPAEDIC SURGERY

## 2019-11-12 NOTE — PROGRESS NOTES
"  Physical Therapy Daily Treatment Note     Name: Celestine Diallo  Clinic Number: [id number]    Therapy Diagnosis:   No diagnosis found.  Physician: Radha Ruano NP    Visit Date: 11/13/2019    Physician Orders: PT Eval and Treat: S/p right knee replacement 9/30 by Dr. [facility]. Just finished home health and ready for outpatient PT  Medical Diagnosis from Referral: Z96.651 (ICD-10-CM) - S/P TKR (total knee replacement), right  Evaluation Date: 10/24/2019  Authorization Period Expiration: 12/31/19  Plan of Care Expiration: 12/20/19  Visit # / Visits authorized: 6/ 20    G code: 6/10  Visit amount: 57.78  Total amount: 407.78***    Time In: 11:01 am   Time Out: 11:56 am ***  Total Billable Time: 27 ***minutes    Precautions: Standard    Subjective     Pt reports: experiencing just a little pain in her (R) knee prior to therapy session.   She was compliant with home exercise program.  Response to previous treatment: no adverse effects  Functional change: none    Pain: 1/10  Location: right knee      Objective     Celestine received the following manual therapy techniques:  for 10 minutes, including:  STM to (R) quad, hamstring, gastroc, and ITB  (R) knee scar mobs    Celestine received therapeutic exercises to develop strength, ROM and flexibility for 40 minutes including:    Bike x 5'   Seated hamstring stretch 2' w/foot propped   gastroc stretch w/strap  Heel slides 5"x10 w/strap  Quad sets towel under ankle 5"x20 - not performed  SLR 2x10 - not performed  SL hip abduction 2x10 R - not performed  Hamstring curls RTB 2x10   LAQ 2x10 2" hold   Step ups 2x10 L2 R    (R) knee AROM = 0-120 degrees        Celestine participated in neuromuscular re-education activities  for 5 minutes. The following activities were included:  Steamboats 2x10 B     Home Exercises Provided and Patient Education Provided     Education provided:   - HEP    Written Home Exercises Provided: yes.  Exercises were reviewed and Celestine was able to demonstrate them " prior to the end of the session.  Celestine demonstrated good  understanding of the education provided.     See EMR under Patient Instructions for exercises provided 11/7/2019 and initial evaluation      Assessment     Pt demo improved (R) knee flexion and extension AROM compared to measurements taken on initial evaluation. Pt was able to tolerate all therex including progression to standing therex/activities without any adverse reactions.   Celestine is progressing well towards her goals.   Pt prognosis is Good.     Pt will continue to benefit from skilled outpatient physical therapy to address the deficits listed in the problem list box on initial evaluation, provide pt/family education and to maximize pt's level of independence in the home and community environment.     Pt's spiritual, cultural and educational needs considered and pt agreeable to plan of care and goals.    Anticipated barriers to physical therapy: pain    Goals:     Short Term Goals: 4 weeks     1. Pt will be independent with HEP supplement PT in improving functional mobility.  2. Pt will improve (R) LE strength to at least 75% of (L) LE strength as measured via MicroFet handheld dynamometer in order to improve functional mobility        Long Term Goals:  1. Pt will improve (R) LE strength to at least 90% of (L) LE strength as measured via MicroFet handheld dynamometer in order to improve functional mobility  2. Pt will improve (R) knee AROM to at least 0-120 degrees in order to improve gait and ability to perform ADLs  3. Pt will improve FOTO knee survey score to </= 35% limited in order to demo improved functional mobility  4. Pt will perform TUG in < 10 seconds without AD in order to demo improved gait speed  5. Pt will perform at least 14 sit to stands without UE support on 30 second sit to stand test in order to demo improved ability to perform transfers    Plan     Continue per POC, progress standing therex/activities as tolerated    Raegan Smith  PT

## 2019-11-13 ENCOUNTER — CLINICAL SUPPORT (OUTPATIENT)
Dept: REHABILITATION | Facility: HOSPITAL | Age: 74
End: 2019-11-13
Payer: MEDICARE

## 2019-11-13 DIAGNOSIS — M25.661 DECREASED RANGE OF MOTION OF RIGHT KNEE: ICD-10-CM

## 2019-11-13 DIAGNOSIS — R53.1 DECREASED STRENGTH: ICD-10-CM

## 2019-11-13 DIAGNOSIS — M25.561 CHRONIC PAIN OF RIGHT KNEE: ICD-10-CM

## 2019-11-13 DIAGNOSIS — R26.89 IMPAIRED GAIT AND MOBILITY: ICD-10-CM

## 2019-11-13 DIAGNOSIS — G89.29 CHRONIC PAIN OF RIGHT KNEE: ICD-10-CM

## 2019-11-13 PROCEDURE — 97112 NEUROMUSCULAR REEDUCATION: CPT

## 2019-11-13 PROCEDURE — 97110 THERAPEUTIC EXERCISES: CPT

## 2019-11-13 PROCEDURE — 97116 GAIT TRAINING THERAPY: CPT

## 2019-11-13 NOTE — PROGRESS NOTES
"  Physical Therapy Daily Treatment Note     Name: Celestine Diallo  Clinic Number: [id number]    Therapy Diagnosis:   Encounter Diagnoses   Name Primary?    Chronic pain of right knee     Decreased range of motion of right knee     Decreased strength     Impaired gait and mobility      Physician: Radha Ruano NP    Visit Date: 11/13/2019    Physician Orders: PT Eval and Treat: S/p right knee replacement 9/30 by Dr. [facility]. Just finished home health and ready for outpatient PT  Medical Diagnosis from Referral: Z96.651 (ICD-10-CM) - S/P TKR (total knee replacement), right  Evaluation Date: 10/24/2019  Authorization Period Expiration: 12/31/19  Plan of Care Expiration: 12/20/19  Visit # / Visits authorized: 6/ 20 G code: 6/10  Visit amount: 125.09  Total amount: 532.87    Time In: 9:57 am   Time Out: 11:00 am   Total Billable Time: 58 minutes    Precautions: Standard    Subjective     Pt reports: that she has MD apt yesterday and MD told her her (R) knee looked good. Pt stated that she feels like she is moving around easier and more smoothly.   She was compliant with home exercise program.  Response to previous treatment: no adverse effects  Functional change: improved gait     Pain: 1/10  Location: right knee      Objective       Celestine received therapeutic exercises to develop strength, ROM and flexibility for 42 minutes including:    Bike x 5' - supervised   Seated hamstring stretch 2' w/foot propped   gastroc stretch w/strap  Heel slides 5"x20 w/strap  Quad sets towel under ankle 5"x20 - not performed  SLR 2x10 - not performed  SL hip abduction 2x10 R - not performed  Hamstring curls RTB 2x10   LAQ 2x10 2" hold 1#  Step ups 2x10 L2 R  Standing heel raises 2x10  TKE OTC 2x10 3" hold         Celestine participated in gait training for 12 minutes. The following activities were included:  Gait training with SPC in clinic      Celestine participated in neuromuscular re-education activities  for 9 minutes. The following " activities were included:  Steamboats 2x10 B fingertips only      Home Exercises Provided and Patient Education Provided     Education provided:   - Instructed pt to ambulate with SPC within her home, but to continue to ambulate with RW outside of her home. Pt verbalized understanding.   - HEP    Written Home Exercises Provided: Patient instructed to cont prior HEP.  Exercises were reviewed and Celestine was able to demonstrate them prior to the end of the session.  Celestine demonstrated good  understanding of the education provided.     See EMR under Patient Instructions for exercises provided 11/7/2019 and initial evaluation.      Assessment     Pt initially required VC for proper sequence during gait training with SPC, but then was able to ambulate multiple laps in clinic with SPC without VC for correct sequence. Pt demo safe, steady gait when ambulating with SPC. Pt was able to tolerate all therex/activities including additional standing therex noted above without any adverse reactions.   Celestine is progressing well towards her goals.   Pt prognosis is Good.     Pt will continue to benefit from skilled outpatient physical therapy to address the deficits listed in the problem list box on initial evaluation, provide pt/family education and to maximize pt's level of independence in the home and community environment.     Pt's spiritual, cultural and educational needs considered and pt agreeable to plan of care and goals.    Anticipated barriers to physical therapy: pain    Goals:     Short Term Goals: 4 weeks     1. Pt will be independent with HEP supplement PT in improving functional mobility.  2. Pt will improve (R) LE strength to at least 75% of (L) LE strength as measured via MicroFet handheld dynamometer in order to improve functional mobility        Long Term Goals:  1. Pt will improve (R) LE strength to at least 90% of (L) LE strength as measured via MicroFet handheld dynamometer in order to improve functional  mobility  2. Pt will improve (R) knee AROM to at least 0-120 degrees in order to improve gait and ability to perform ADLs  3. Pt will improve FOTO knee survey score to </= 35% limited in order to demo improved functional mobility  4. Pt will perform TUG in < 10 seconds without AD in order to demo improved gait speed  5. Pt will perform at least 14 sit to stands without UE support on 30 second sit to stand test in order to demo improved ability to perform transfers      Plan     Continue per POC, progress as tolerated     Farhana Duque, PT

## 2019-11-14 NOTE — PROGRESS NOTES
"  Physical Therapy Daily Treatment Note     Name: Celestine Diallo  Clinic Number: [id number]    Therapy Diagnosis:   Encounter Diagnoses   Name Primary?    Chronic pain of right knee     Decreased range of motion of right knee     Decreased strength     Impaired gait and mobility      Physician: Radha Ruano NP    Visit Date: 11/15/2019    Physician Orders: PT Eval and Treat: S/p right knee replacement 9/30 by Dr. [facility]. Just finished home health and ready for outpatient PT  Medical Diagnosis from Referral: Z96.651 (ICD-10-CM) - S/P TKR (total knee replacement), right  Evaluation Date: 10/24/2019  Authorization Period Expiration: 12/31/19  Plan of Care Expiration: 12/20/19  Visit # / Visits authorized: 6/ 20    G code: 6/10  Visit amount: 64.45  Total amount: 597.32    Time In: 10:00 am   Time Out: 10:58 am   Total Billable Time: 25 minutes    Precautions: Standard    Subjective     Pt reports: that she has MD apt yesterday and MD told her her (R) knee looked good. Pt stated that she feels like she is moving around easier and more smoothly.   She was compliant with home exercise program.  Response to previous treatment: no adverse effects  Functional change: improved gait     Pain: 1/10  Location: right knee      Objective       Celestine received therapeutic exercises to develop strength, ROM and flexibility for 33 minutes including:    Bike x 5' - supervised   Seated hamstring stretch 2' w/foot propped   gastroc stretch w/strap  Heel slides 5"x20 w/strap  Quad sets towel under ankle 5"x20 - not performed  SLR 2x10 - not performed  SL hip abduction 2x10 R - not performed  Hamstring curls RTB 2x10   LAQ 3x10 2" hold 1#  Step ups 2x10 L2 R  Standing heel raises 3x10  TKE OTC 3x10 3" hold         Celestine participated in gait training for 15 minutes. The following activities were included:  Gait training with SPC in clinic in straight line & around obstacles   Gait training short distances in clinic without AD with SBA "       Celestine participated in neuromuscular re-education activities  for 10 minutes. The following activities were included:  Steamboats 2x10 B fingertips only      Home Exercises Provided and Patient Education Provided     Education provided:   - Instructed pt to ambulate with SPC within her home, but to continue to ambulate with RW outside of her home. Pt verbalized understanding.   - HEP    Written Home Exercises Provided: Patient instructed to cont prior HEP.  Exercises were reviewed and Celestine was able to demonstrate them prior to the end of the session.  Celestine demonstrated good  understanding of the education provided.     See EMR under Patient Instructions for exercises provided 11/7/2019 and initial evaluation.      Assessment     Pt tolerated session well; she was able to tolerate all standing exercises without a seated break. She is demonstrating improved functional dynamic balance with use of SPC and ambulation short distances without AD.     Celestine is progressing well towards her goals.   Pt prognosis is Good.     Pt will continue to benefit from skilled outpatient physical therapy to address the deficits listed in the problem list box on initial evaluation, provide pt/family education and to maximize pt's level of independence in the home and community environment.     Pt's spiritual, cultural and educational needs considered and pt agreeable to plan of care and goals.    Anticipated barriers to physical therapy: pain    Goals:     Short Term Goals: 4 weeks     1. Pt will be independent with HEP supplement PT in improving functional mobility.  2. Pt will improve (R) LE strength to at least 75% of (L) LE strength as measured via MicroFet handheld dynamometer in order to improve functional mobility        Long Term Goals:  1. Pt will improve (R) LE strength to at least 90% of (L) LE strength as measured via MicroFet handheld dynamometer in order to improve functional mobility  2. Pt will improve (R) knee AROM  "to at least 0-120 degrees in order to improve gait and ability to perform ADLs  3. Pt will improve FOTO knee survey score to </= 35% limited in order to demo improved functional mobility  4. Pt will perform TUG in < 10 seconds without AD in order to demo improved gait speed  5. Pt will perform at least 14 sit to stands without UE support on 30 second sit to stand test in order to demo improved ability to perform transfers      Plan     Continue per POC, progress as tolerated     Raegan Smith, PT   Physical Therapy Daily Treatment Note     Name: Celestine Diallo  Clinic Number: [id number]    Therapy Diagnosis:   Encounter Diagnoses   Name Primary?    Chronic pain of right knee     Decreased range of motion of right knee     Decreased strength     Impaired gait and mobility      Physician: Radha Ruano NP    Visit Date: 11/15/2019    Physician Orders: PT Eval and Treat: S/p right knee replacement 9/30 by Dr. [facility]. Just finished home health and ready for outpatient PT  Medical Diagnosis from Referral: Z96.651 (ICD-10-CM) - S/P TKR (total knee replacement), right  Evaluation Date: 10/24/2019  Authorization Period Expiration: 12/31/19  Plan of Care Expiration: 12/20/19  Visit # / Visits authorized: 7/ 20    G code: 7/10  Visit amount: 64.47  Total amount: 597.32    Time In: 10:00 am   Time Out: 10:58 am   Total Billable Time: 25 minutes    Precautions: Standard    Subjective     Pt reports: her knee isn't hurt too badly today  She was compliant with home exercise program.  Response to previous treatment: no adverse effects  Functional change: improved gait     Pain: 1/10  Location: right knee      Objective       Celestine received therapeutic exercises to develop strength, ROM and flexibility for 33 minutes including:    Bike x 5' - supervised   Seated hamstring stretch 2' w/foot propped   gastroc stretch w/strap  Heel slides 5"x20 w/strap  Quad sets towel under ankle 5"x20 - not performed  SLR 2x10 - not performed  SL " "hip abduction 2x10 R - not performed  Hamstring curls RTB 2x10   LAQ 2x10 2" hold 1#  Step ups 2x10 L2 R- not performed today   Standing heel raises 2x10  TKE OTC 2x10 3" hold         Celestine participated in gait training for 15 minutes. The following activities were included:  Gait training with SPC in clinic in straight line & around obstacles   Gait training short distances with SBA without AD       Celestine participated in neuromuscular re-education activities  for 10 minutes. The following activities were included:  Steamboats 2x10 B fingertips only      Home Exercises Provided and Patient Education Provided     Education provided:   - Instructed pt to ambulate with SPC within her home, but to continue to ambulate with RW outside of her home. Pt verbalized understanding.   - HEP    Written Home Exercises Provided: Patient instructed to cont prior HEP.  Exercises were reviewed and Celestine was able to demonstrate them prior to the end of the session.  Celestine demonstrated good  understanding of the education provided.     See EMR under Patient Instructions for exercises provided 11/7/2019 and initial evaluation.      Assessment     Pt initially required VC for proper sequence during gait training with SPC, but then was able to ambulate multiple laps in clinic with SPC without VC for correct sequence. Pt demo safe, steady gait when ambulating with SPC. Pt was able to tolerate all therex/activities including additional standing therex noted above without any adverse reactions.   Celestine is progressing well towards her goals.   Pt prognosis is Good.     Pt will continue to benefit from skilled outpatient physical therapy to address the deficits listed in the problem list box on initial evaluation, provide pt/family education and to maximize pt's level of independence in the home and community environment.     Pt's spiritual, cultural and educational needs considered and pt agreeable to plan of care and goals.    Anticipated " barriers to physical therapy: pain    Goals:     Short Term Goals: 4 weeks     1. Pt will be independent with HEP supplement PT in improving functional mobility.  2. Pt will improve (R) LE strength to at least 75% of (L) LE strength as measured via MicroFet handheld dynamometer in order to improve functional mobility        Long Term Goals:  1. Pt will improve (R) LE strength to at least 90% of (L) LE strength as measured via MicroFet handheld dynamometer in order to improve functional mobility  2. Pt will improve (R) knee AROM to at least 0-120 degrees in order to improve gait and ability to perform ADLs  3. Pt will improve FOTO knee survey score to </= 35% limited in order to demo improved functional mobility  4. Pt will perform TUG in < 10 seconds without AD in order to demo improved gait speed  5. Pt will perform at least 14 sit to stands without UE support on 30 second sit to stand test in order to demo improved ability to perform transfers      Plan     Continue per POC, progress as tolerated     Raegan Smith, PT

## 2019-11-15 ENCOUNTER — CLINICAL SUPPORT (OUTPATIENT)
Dept: REHABILITATION | Facility: HOSPITAL | Age: 74
End: 2019-11-15
Payer: MEDICARE

## 2019-11-15 DIAGNOSIS — M25.661 DECREASED RANGE OF MOTION OF RIGHT KNEE: ICD-10-CM

## 2019-11-15 DIAGNOSIS — R26.89 IMPAIRED GAIT AND MOBILITY: ICD-10-CM

## 2019-11-15 DIAGNOSIS — R53.1 DECREASED STRENGTH: ICD-10-CM

## 2019-11-15 DIAGNOSIS — G89.29 CHRONIC PAIN OF RIGHT KNEE: ICD-10-CM

## 2019-11-15 DIAGNOSIS — M25.561 CHRONIC PAIN OF RIGHT KNEE: ICD-10-CM

## 2019-11-15 PROCEDURE — 97112 NEUROMUSCULAR REEDUCATION: CPT

## 2019-11-15 PROCEDURE — 97116 GAIT TRAINING THERAPY: CPT

## 2019-11-20 ENCOUNTER — CLINICAL SUPPORT (OUTPATIENT)
Dept: REHABILITATION | Facility: HOSPITAL | Age: 74
End: 2019-11-20
Payer: MEDICARE

## 2019-11-20 DIAGNOSIS — R26.89 IMPAIRED GAIT AND MOBILITY: ICD-10-CM

## 2019-11-20 DIAGNOSIS — R53.1 DECREASED STRENGTH: ICD-10-CM

## 2019-11-20 DIAGNOSIS — M25.561 CHRONIC PAIN OF RIGHT KNEE: ICD-10-CM

## 2019-11-20 DIAGNOSIS — M25.661 DECREASED RANGE OF MOTION OF RIGHT KNEE: ICD-10-CM

## 2019-11-20 DIAGNOSIS — G89.29 CHRONIC PAIN OF RIGHT KNEE: ICD-10-CM

## 2019-11-20 PROCEDURE — 97110 THERAPEUTIC EXERCISES: CPT

## 2019-11-20 PROCEDURE — 97112 NEUROMUSCULAR REEDUCATION: CPT

## 2019-11-20 PROCEDURE — 97116 GAIT TRAINING THERAPY: CPT

## 2019-11-20 NOTE — PROGRESS NOTES
"  Physical Therapy Daily Treatment Note     Name: Celestine Diallo  Clinic Number: [id number]    Therapy Diagnosis:   Encounter Diagnoses   Name Primary?    Chronic pain of right knee     Decreased range of motion of right knee     Decreased strength     Impaired gait and mobility      Physician: Radha Ruano NP    Visit Date: 11/20/2019    Physician Orders: PT Eval and Treat: S/p right knee replacement 9/30 by Dr. [facility]. Just finished home health and ready for outpatient PT  Medical Diagnosis from Referral: Z96.651 (ICD-10-CM) - S/P TKR (total knee replacement), right  Evaluation Date: 10/24/2019  Authorization Period Expiration: 12/31/19  Plan of Care Expiration: 12/20/19  Visit # / Visits authorized: 7/ 20    G code: 7/10  Visit amount: 125.09  Total amount: 722.41    Time In: 10:00 am   Time Out: 11:00 am   Total Billable Time: 55 minutes    Precautions: Standard    Subjective     Pt reports: that she has been using SPC around her home without any difficulty. Pt stated that her (R) knee still hurts when she tries to bend it all the way.   She was compliant with home exercise program.  Response to previous treatment: no adverse effects  Functional change: improved gait     Pain: 1/10  Location: right knee      Objective       Celestine received therapeutic exercises to develop strength, ROM and flexibility for 35 minutes including:    Bike x 5' - supervised   Seated hamstring stretch 2' w/foot propped - not performed  gastroc stretch on wedge 3x30"   Heel slides 5"x20 w/strap - not performed  Quad sets towel under ankle 5"x20 - not performed  SLR 2x10 - not performed  SL hip abduction 2x10 R - not performed  Hamstring curls RTB 2x10 - not performed  LAQ 3x10 2" hold 1# - not performed  Step ups 2x10 L2 R  Standing heel raises 3x10  TKE OTC 3x10 3" hold   Shuttle 2x10 1c DL        Celestine participated in gait training for 15 minutes. The following activities were included:  Gait training with SPC in clinic in " straight line & around/over obstacles and stepping up/down on L1 and L2 steps with SPC      Celestine participated in neuromuscular re-education activities  for 10 minutes. The following activities were included:  Steamboats 3x10 B fingertips only      Home Exercises Provided and Patient Education Provided     Education provided:   - Continue with HEP     Written Home Exercises Provided: Patient instructed to cont prior HEP.  Exercises were reviewed and Celestine was able to demonstrate them prior to the end of the session.  Celestine demonstrated good  understanding of the education provided.     See EMR under Patient Instructions for exercises provided 11/7/2019 and initial evaluation.      Assessment     Pt was able to safely ambulate in clinic and around/over obstacles and ascend/descend L1 and L2 steps with SPC with steady gait and no LOB episodes. Pt also reported that she felt comfortable and steady ambulating with SPC. Pt was able to tolerate addition of shuttle therex today without c/o increased pain. Pt tolerated therapy session without any adverse reactions.   Celestine is progressing well towards her goals.   Pt prognosis is Good.     Pt will continue to benefit from skilled outpatient physical therapy to address the deficits listed in the problem list box on initial evaluation, provide pt/family education and to maximize pt's level of independence in the home and community environment.     Pt's spiritual, cultural and educational needs considered and pt agreeable to plan of care and goals.    Anticipated barriers to physical therapy: pain    Goals:     Short Term Goals: 4 weeks     1. Pt will be independent with HEP supplement PT in improving functional mobility.  2. Pt will improve (R) LE strength to at least 75% of (L) LE strength as measured via MicroFet handheld dynamometer in order to improve functional mobility        Long Term Goals:  1. Pt will improve (R) LE strength to at least 90% of (L) LE strength as  measured via MicroFet handheld dynamometer in order to improve functional mobility  2. Pt will improve (R) knee AROM to at least 0-120 degrees in order to improve gait and ability to perform ADLs  3. Pt will improve FOTO knee survey score to </= 35% limited in order to demo improved functional mobility  4. Pt will perform TUG in < 10 seconds without AD in order to demo improved gait speed  5. Pt will perform at least 14 sit to stands without UE support on 30 second sit to stand test in order to demo improved ability to perform transfers      Plan     Continue per POC, progress as tolerated    Farhana Duque, PT

## 2019-11-22 ENCOUNTER — CLINICAL SUPPORT (OUTPATIENT)
Dept: REHABILITATION | Facility: HOSPITAL | Age: 74
End: 2019-11-22
Payer: MEDICARE

## 2019-11-22 DIAGNOSIS — M25.561 CHRONIC PAIN OF RIGHT KNEE: ICD-10-CM

## 2019-11-22 DIAGNOSIS — M25.661 DECREASED RANGE OF MOTION OF RIGHT KNEE: ICD-10-CM

## 2019-11-22 DIAGNOSIS — G89.29 CHRONIC PAIN OF RIGHT KNEE: ICD-10-CM

## 2019-11-22 DIAGNOSIS — R53.1 DECREASED STRENGTH: ICD-10-CM

## 2019-11-22 DIAGNOSIS — R26.89 IMPAIRED GAIT AND MOBILITY: ICD-10-CM

## 2019-11-22 PROCEDURE — 97110 THERAPEUTIC EXERCISES: CPT

## 2019-11-22 PROCEDURE — 97116 GAIT TRAINING THERAPY: CPT

## 2019-11-22 NOTE — PROGRESS NOTES
"  Physical Therapy Daily Treatment Note     Name: Celestine Diallo  Clinic Number: [id number]    Therapy Diagnosis:   Encounter Diagnoses   Name Primary?    Chronic pain of right knee     Decreased range of motion of right knee     Decreased strength     Impaired gait and mobility      Physician: Radha Ruano NP    Visit Date: 11/22/2019    Physician Orders: PT Eval and Treat: S/p right knee replacement 9/30 by Dr. [facility]. Just finished home health and ready for outpatient PT  Medical Diagnosis from Referral: Z96.651 (ICD-10-CM) - S/P TKR (total knee replacement), right  Evaluation Date: 10/24/2019  Authorization Period Expiration: 12/31/19  Plan of Care Expiration: 12/20/19  Visit # / Visits authorized: 8/ 20    G code: 8/10  Visit amount: 60.30  Total amount: 782.71    Time In: 10:05 am   Time Out: 10:56 am   Total Billable Time: 26  minutes    Precautions: Standard    Subjective     Pt reports: that she did some walking outside with SPC. Pt stated that around her house she is not using SPC.   She was compliant with home exercise program.  Response to previous treatment: no adverse effects  Functional change: improved gait     Pain: 1/10  Location: right knee      Objective       Celestine received therapeutic exercises to develop strength, ROM and flexibility for 26 minutes including:    Bike x 5' - supervised   Seated hamstring stretch 2' w/foot propped - not performed  gastroc stretch on wedge 3x30"   Heel slides 5"x20 w/strap - not performed  Quad sets towel under ankle 5"x20 - not performed  SLR 2x10 - not performed  SL hip abduction 2x10 R - not performed  Hamstring curls GTB 2x10   LAQ 3x10 2" hold 1# - not performed  Step ups 2x10 L2 R  Standing heel raises 3x10  TKE OTC 3x10 3" hold   Shuttle 2x10 1c DL  Step down L1 x10       Celestine participated in gait training for 15 minutes. The following activities were included:  Gait training with SPC in clinic in straight line & around/over obstacles and stepping " up/down on L1 and L2 steps with SPC and ambulating in clinic without SPC      Celestine participated in neuromuscular re-education activities  for 10 minutes. The following activities were included:  Steamboats 3x10 B fingertips only      Home Exercises Provided and Patient Education Provided     Education provided:   - Continue with HEP     Written Home Exercises Provided: Patient instructed to cont prior HEP.  Exercises were reviewed and Celestine was able to demonstrate them prior to the end of the session.  Celestine demonstrated good  understanding of the education provided.     See EMR under Patient Instructions for exercises provided 11/7/2019 and initial evaluation.      Assessment     Pt was again able to ambulate with SPC around/over obstacles and ascend/descend steps without any LOB episodes. Pt was also able to ambulate in clinic without SPC without any LOB episodes. Pt was able to tolerate addition of step down therex . Pt tolerated therapy session without any adverse reactions.   Celestine is progressing well towards her goals.   Pt prognosis is Good.     Pt will continue to benefit from skilled outpatient physical therapy to address the deficits listed in the problem list box on initial evaluation, provide pt/family education and to maximize pt's level of independence in the home and community environment.     Pt's spiritual, cultural and educational needs considered and pt agreeable to plan of care and goals.    Anticipated barriers to physical therapy: pain    Goals:     Short Term Goals: 4 weeks     1. Pt will be independent with HEP supplement PT in improving functional mobility.  2. Pt will improve (R) LE strength to at least 75% of (L) LE strength as measured via MicroFet handheld dynamometer in order to improve functional mobility        Long Term Goals:  1. Pt will improve (R) LE strength to at least 90% of (L) LE strength as measured via MicroFet handheld dynamometer in order to improve functional  mobility  2. Pt will improve (R) knee AROM to at least 0-120 degrees in order to improve gait and ability to perform ADLs  3. Pt will improve FOTO knee survey score to </= 35% limited in order to demo improved functional mobility  4. Pt will perform TUG in < 10 seconds without AD in order to demo improved gait speed  5. Pt will perform at least 14 sit to stands without UE support on 30 second sit to stand test in order to demo improved ability to perform transfers      Plan     Continue per POC, progress as tolerated    Farhana Duque, PT

## 2019-11-27 ENCOUNTER — CLINICAL SUPPORT (OUTPATIENT)
Dept: REHABILITATION | Facility: HOSPITAL | Age: 74
End: 2019-11-27
Payer: MEDICARE

## 2019-11-27 DIAGNOSIS — R53.1 DECREASED STRENGTH: ICD-10-CM

## 2019-11-27 DIAGNOSIS — R26.89 IMPAIRED GAIT AND MOBILITY: ICD-10-CM

## 2019-11-27 DIAGNOSIS — M25.561 CHRONIC PAIN OF RIGHT KNEE: ICD-10-CM

## 2019-11-27 DIAGNOSIS — G89.29 CHRONIC PAIN OF RIGHT KNEE: ICD-10-CM

## 2019-11-27 DIAGNOSIS — M25.661 DECREASED RANGE OF MOTION OF RIGHT KNEE: ICD-10-CM

## 2019-11-27 PROCEDURE — 97112 NEUROMUSCULAR REEDUCATION: CPT

## 2019-11-27 PROCEDURE — 97110 THERAPEUTIC EXERCISES: CPT

## 2019-11-27 NOTE — PROGRESS NOTES
"  Physical Therapy Daily Treatment Note     Name: Celestine Diallo  Clinic Number: [id number]    Therapy Diagnosis:   Encounter Diagnoses   Name Primary?    Chronic pain of right knee     Decreased range of motion of right knee     Decreased strength     Impaired gait and mobility      Physician: Radha Ruano NP    Visit Date: 11/27/2019    Physician Orders: PT Eval and Treat: S/p right knee replacement 9/30 by Dr. [facility]. Just finished home health and ready for outpatient PT  Medical Diagnosis from Referral: Z96.651 (ICD-10-CM) - S/P TKR (total knee replacement), right  Evaluation Date: 10/24/2019  Authorization Period Expiration: 12/31/19  Plan of Care Expiration: 12/20/19  Visit # / Visits authorized: 9/ 20    G code: 9/10  Visit amount: 64.79  Total amount: 847.50    Time In: 10:57 am   Time Out: 11:53 am   Total Billable Time: 28 minutes    Precautions: Standard    Subjective     Pt reports: no pain in (R) knee prior to therapy session. Pt stated that she has not been using RW, just SPC. Pt stated that she did a little shopping this morning prior to therapy session.   She was compliant with home exercise program.  Response to previous treatment: no adverse effects  Functional change: ambulating with SPC    Pain: 0/10  Location: right knee      Objective     Celestine received therapeutic exercises to develop strength, ROM and flexibility for 40 minutes including:    Bike x 5' -   Seated hamstring stretch 2' w/foot propped - not performed  gastroc stretch on wedge 3x30"   Heel slides 5"x20 w/strap - not performed  Quad sets towel under ankle 5"x20 - not performed  SLR 2x10 - not performed  SL hip abduction 2x10 R - not performed  Hamstring curls GTB 2x10   LAQ 3x10 2" hold 1# - not performed  Step ups 2x10 L2 R  Standing heel raises 3x10  TKE OTC 3x10 3" hold   Shuttle 2x10 1.5 c DL  Step down L1 x10       Celestine participated in gait training for 5 minutes. The following activities were included:  Gait training " in clinic with no AD around/over obstacles and stepping up/down on L1 and L2 steps and walking on foam pads       Celestine participated in neuromuscular re-education activities  for 11 minutes. The following activities were included:  Steamboats 2x10 B fingertips only blue foam     Home Exercises Provided and Patient Education Provided     Education provided:   - Continue with HEP     Written Home Exercises Provided: Patient instructed to cont prior HEP.  Exercises were reviewed and Celestine was able to demonstrate them prior to the end of the session.  Celestine demonstrated good  understanding of the education provided.     See EMR under Patient Instructions for exercises provided 11/7/2019 and initial evaluation.      Assessment     Pt arrived to therapy session today with SPC. Pt was able to ambulate around clinic without AD and performed gait training noted above without AD, with CGA from PT and with no LOB episodes. Pt is demonstrating improved confidence ambulating with SPC and without SPC. Pt was able to tolerate increased resistance for shuttle therex today and performing steamboats on blue foam today to further challenge her balance. Pt tolerated therapy session without any adverse reactions.   Celestine is progressing well towards her goals.   Pt prognosis is Good.     Pt will continue to benefit from skilled outpatient physical therapy to address the deficits listed in the problem list box on initial evaluation, provide pt/family education and to maximize pt's level of independence in the home and community environment.     Pt's spiritual, cultural and educational needs considered and pt agreeable to plan of care and goals.    Anticipated barriers to physical therapy: pain    Goals:     Short Term Goals: 4 weeks     1. Pt will be independent with HEP supplement PT in improving functional mobility.  2. Pt will improve (R) LE strength to at least 75% of (L) LE strength as measured via [employer] handheld dynamometer in  order to improve functional mobility        Long Term Goals:  1. Pt will improve (R) LE strength to at least 90% of (L) LE strength as measured via MicroFet handheld dynamometer in order to improve functional mobility  2. Pt will improve (R) knee AROM to at least 0-120 degrees in order to improve gait and ability to perform ADLs  3. Pt will improve FOTO knee survey score to </= 35% limited in order to demo improved functional mobility  4. Pt will perform TUG in < 10 seconds without AD in order to demo improved gait speed  5. Pt will perform at least 14 sit to stands without UE support on 30 second sit to stand test in order to demo improved ability to perform transfers        Plan     Continue per POC, progress as tolerated    Farhana Duque, PT

## 2019-11-29 ENCOUNTER — CLINICAL SUPPORT (OUTPATIENT)
Dept: REHABILITATION | Facility: HOSPITAL | Age: 74
End: 2019-11-29
Payer: MEDICARE

## 2019-11-29 DIAGNOSIS — R26.89 IMPAIRED GAIT AND MOBILITY: ICD-10-CM

## 2019-11-29 DIAGNOSIS — R53.1 DECREASED STRENGTH: ICD-10-CM

## 2019-11-29 DIAGNOSIS — G89.29 CHRONIC PAIN OF RIGHT KNEE: ICD-10-CM

## 2019-11-29 DIAGNOSIS — M25.561 CHRONIC PAIN OF RIGHT KNEE: ICD-10-CM

## 2019-11-29 DIAGNOSIS — M25.661 DECREASED RANGE OF MOTION OF RIGHT KNEE: ICD-10-CM

## 2019-11-29 PROCEDURE — 97110 THERAPEUTIC EXERCISES: CPT

## 2019-11-29 PROCEDURE — 97116 GAIT TRAINING THERAPY: CPT

## 2019-11-29 NOTE — PROGRESS NOTES
"  Physical Therapy Daily Treatment Note     Name: Celestine Diallo  Clinic Number: [id number]    Therapy Diagnosis:   Encounter Diagnoses   Name Primary?    Chronic pain of right knee     Decreased range of motion of right knee     Decreased strength     Impaired gait and mobility      Physician: Radha Ruano NP    Visit Date: 11/29/2019    Physician Orders: PT Eval and Treat: S/p right knee replacement 9/30 by Dr. [facility]. Just finished home health and ready for outpatient PT  Medical Diagnosis from Referral: Z96.651 (ICD-10-CM) - S/P TKR (total knee replacement), right  Evaluation Date: 10/24/2019  Authorization Period Expiration: 12/31/19  Plan of Care Expiration: 12/20/19  Visit # / Visits authorized: 10/ 20 FOTO NEXT     G code: 10/10  Visit amount: 60.30  Total amount: 907.80    Time In: 10:02 am   Time Out: 11:00 am   Total Billable Time: 28 minutes    Precautions: Standard    Subjective     Pt reports: that her (R) knee hurts a little still when she bends (R) knee.   She was compliant with home exercise program.  Response to previous treatment: no adverse effects  Functional change: ambulating with SPC    Pain: 0/10  Location: right knee      Objective     Celestine received assessment and  therapeutic exercises to develop strength, ROM and flexibility for 50 minutes including:    Bike x 5' -   Supine hamstring stretch 3x30" w/strap    gastroc stretch on wedge 3x30"   Heel slides 5"x20 w/strap - not performed  Quad sets towel under ankle 5"x20 - not performed  SLR 2x10 - not performed  SL hip abduction 2x10 R - not performed  Hamstring curls GTB 2x10   LAQ 3x10 2" hold 1# - not performed  Step ups 2x10 L2 R - not performed   Standing heel raises 3x10 - not performed  TKE OTC 3x10 3" hold   Shuttle 2x10 1.5 c DL  Step down L1 x10       Knee  Right  Left Pain/Dysfunction with Movement     AROM AROM NT = not tested   Flexion 120 140     Extension 3 lacking 0           L/E Strength w/ MicroFET Muscle Caitlin " Dynamometer Right Left Pain/Dysfunction with Movement   (approx 4 sec hold w/ max contraction)   Hip Flexion 17.4 kg  22.8 kg      Hip Abduction 22.4 kg  19.2 kg      Quadriceps 23.8 kg  23.6 kg      Hamstrings 21.3 kg  21.4 kg        TU seconds w/o AD      30 Second sit to stand test: 8 w/o UE support     Celestine participated in gait training for 8 minutes. The following activities were included:  Gait training in clinic with no AD around/over obstacles and stepping up/down on L1 and L2 steps and walking on foam pads       Celestine participated in neuromuscular re-education activities  for 0 minutes. The following activities were included:  Steamboats 2x10 B fingertips only blue foam  - not performed today      Home Exercises Provided and Patient Education Provided     Education provided:   - Continue with HEP     Written Home Exercises Provided: Patient instructed to cont prior HEP.  Exercises were reviewed and Celestine was able to demonstrate them prior to the end of the session.  Celestine demonstrated good  understanding of the education provided.     See EMR under Patient Instructions for exercises provided 2019 and initial evaluation.      Assessment     Pt demo improved (R) LE strength, improved (R) knee AROM, improved TUG score, and improved 30 second sit to stand test compared to measurements taken on initial evaluation. Pt does still present lacking (R) knee extension AROM. Pt received VC for heel toe pattern when ambulating without AD. Pt was able to perform gait training noted above without SPC and without any LOB episodes. Pt tolerated therapy session without any adverse reactions.   Celestine is progressing well towards her goals.   Pt prognosis is Good.     Pt will continue to benefit from skilled outpatient physical therapy to address the deficits listed in the problem list box on initial evaluation, provide pt/family education and to maximize pt's level of independence in the home and community  environment.     Pt's spiritual, cultural and educational needs considered and pt agreeable to plan of care and goals.    Anticipated barriers to physical therapy: pain    Goals:     Short Term Goals: 4 weeks     1. Pt will be independent with HEP supplement PT in improving functional mobility. (Met)   2. Pt will improve (R) LE strength to at least 75% of (L) LE strength as measured via MicroFet handheld dynamometer in order to improve functional mobility (Met)         Long Term Goals:  1. Pt will improve (R) LE strength to at least 90% of (L) LE strength as measured via MicroFet handheld dynamometer in order to improve functional mobility (progressing not met)   2. Pt will improve (R) knee AROM to at least 0-120 degrees in order to improve gait and ability to perform ADLs (progressing not Met)   3. Pt will improve FOTO knee survey score to </= 35% limited in order to demo improved functional mobility (progressing not met)   4. Pt will perform TUG in < 10 seconds without AD in order to demo improved gait speed (progressing not met)   5. Pt will perform at least 14 sit to stands without UE support on 30 second sit to stand test in order to demo improved ability to perform transfers (progressing not met)         Plan     Continue per POC, progress as tolerated    Farhana Duque, PT

## 2019-12-02 ENCOUNTER — CLINICAL SUPPORT (OUTPATIENT)
Dept: REHABILITATION | Facility: HOSPITAL | Age: 74
End: 2019-12-02
Payer: MEDICARE

## 2019-12-02 DIAGNOSIS — R53.1 DECREASED STRENGTH: ICD-10-CM

## 2019-12-02 DIAGNOSIS — G89.29 CHRONIC PAIN OF RIGHT KNEE: ICD-10-CM

## 2019-12-02 DIAGNOSIS — R26.89 IMPAIRED GAIT AND MOBILITY: ICD-10-CM

## 2019-12-02 DIAGNOSIS — M25.561 CHRONIC PAIN OF RIGHT KNEE: ICD-10-CM

## 2019-12-02 DIAGNOSIS — M25.661 DECREASED RANGE OF MOTION OF RIGHT KNEE: ICD-10-CM

## 2019-12-02 PROCEDURE — 97116 GAIT TRAINING THERAPY: CPT

## 2019-12-02 PROCEDURE — 97110 THERAPEUTIC EXERCISES: CPT

## 2019-12-02 NOTE — PROGRESS NOTES
"  Physical Therapy Daily Treatment Note     Name: Celestine Diallo  Clinic Number: [id number]    Therapy Diagnosis:   Encounter Diagnoses   Name Primary?    Chronic pain of right knee     Decreased range of motion of right knee     Decreased strength     Impaired gait and mobility      Physician: Radha Ruano NP    Visit Date: 12/2/2019    Physician Orders: PT Eval and Treat: S/p right knee replacement 9/30 by Dr. [facility]. Just finished home health and ready for outpatient PT  Medical Diagnosis from Referral: Z96.651 (ICD-10-CM) - S/P TKR (total knee replacement), right  Evaluation Date: 10/24/2019  Authorization Period Expiration: 12/31/19  Plan of Care Expiration: 12/20/19  Visit # / Visits authorized: 11/ 20 FOTO NEXT     G code: 11/10  Visit amount: 60.30  Total amount: 968.10    Time In: 10:04 am   Time Out: 11:01 am   Total Billable Time: 25 minutes    Precautions: Standard    Subjective     Pt reports: no pain in (R) knee prior to therapy session. Pt stated that she just experiences pain in (R) knee at end range of flexion.   She was compliant with home exercise program.  Response to previous treatment: no adverse effects  Functional change: improved gait    Pain: 0/10  Location: right knee      Objective       Celestine received assessment and  therapeutic exercises to develop strength, ROM and flexibility for 49 minutes including:    Bike x 5' -   Supine hamstring stretch 3x30" w/strap    gastroc stretch on wedge 3x30"   Heel slides 5"x20 w/strap - not performed  Quad sets towel under ankle 5"x20 - not performed  SLR 2x10 - not performed  SL hip abduction 2x10 R - not performed  Hamstring curls GTB 2x10   LAQ 3x10 2" hold 1# -   Step ups 2x10 L2 R -  Standing heel raises 3x10 -   TKE OTC 3x10 3" hold   Shuttle 2x10 1.5 c DL  Step down L2 x10     Celestine participated in gait training for 8 minutes. The following activities were included:  Gait training in clinic with no AD around/over obstacles and stepping " up/down on L2 step and walking on foam pads       Celestine participated in neuromuscular re-education activities  for 0 minutes. The following activities were included:  Steamboats 2x10 B fingertips only blue foam  - not performed today    Home Exercises Provided and Patient Education Provided     Education provided:   - Continue with HEP     Written Home Exercises Provided: Patient instructed to cont prior HEP.  Exercises were reviewed and Celestine was able to demonstrate them prior to the end of the session.  Celestine demonstrated good  understanding of the education provided.     See EMR under Patient Instructions for exercises provided 11/7/2019 and initial evaluation.      Assessment     Pt was able to tolerate performing step downs off higher step today. Pt demo improved gait without AD with improved (R) heel strike and improved gait speed. Pt tolerated therapy session without any adverse reactions.   Celestine is progressing well towards her goals.   Pt prognosis is Good.     Pt will continue to benefit from skilled outpatient physical therapy to address the deficits listed in the problem list box on initial evaluation, provide pt/family education and to maximize pt's level of independence in the home and community environment.     Pt's spiritual, cultural and educational needs considered and pt agreeable to plan of care and goals.    Anticipated barriers to physical therapy: pain    Goals:     Short Term Goals: 4 weeks     1. Pt will be independent with HEP supplement PT in improving functional mobility. (Met)   2. Pt will improve (R) LE strength to at least 75% of (L) LE strength as measured via [employer] handheld dynamometer in order to improve functional mobility (Met)         Long Term Goals:  1. Pt will improve (R) LE strength to at least 90% of (L) LE strength as measured via [employer] handheld dynamometer in order to improve functional mobility (progressing not met)   2. Pt will improve (R) knee AROM to at least  0-120 degrees in order to improve gait and ability to perform ADLs (progressing not Met)   3. Pt will improve FOTO knee survey score to </= 35% limited in order to demo improved functional mobility (progressing not met)   4. Pt will perform TUG in < 10 seconds without AD in order to demo improved gait speed (progressing not met)   5. Pt will perform at least 14 sit to stands without UE support on 30 second sit to stand test in order to demo improved ability to perform transfers (progressing not met)       Plan     Continue per POC, progress as tolerated    Farhana Duque, PT    No

## 2019-12-04 ENCOUNTER — CLINICAL SUPPORT (OUTPATIENT)
Dept: REHABILITATION | Facility: HOSPITAL | Age: 74
End: 2019-12-04
Payer: MEDICARE

## 2019-12-04 DIAGNOSIS — G89.29 CHRONIC PAIN OF RIGHT KNEE: ICD-10-CM

## 2019-12-04 DIAGNOSIS — M25.561 CHRONIC PAIN OF RIGHT KNEE: ICD-10-CM

## 2019-12-04 DIAGNOSIS — M25.661 DECREASED RANGE OF MOTION OF RIGHT KNEE: ICD-10-CM

## 2019-12-04 DIAGNOSIS — R26.89 IMPAIRED GAIT AND MOBILITY: ICD-10-CM

## 2019-12-04 DIAGNOSIS — R53.1 DECREASED STRENGTH: ICD-10-CM

## 2019-12-04 PROCEDURE — 97110 THERAPEUTIC EXERCISES: CPT

## 2019-12-04 NOTE — PROGRESS NOTES
"  Physical Therapy Daily Treatment Note     Name: Celestine Diallo  Clinic Number: [id number]    Therapy Diagnosis:   Encounter Diagnoses   Name Primary?    Chronic pain of right knee     Decreased range of motion of right knee     Decreased strength     Impaired gait and mobility      Physician: Radha Ruano NP    Visit Date: 12/4/2019    Physician Orders: PT Eval and Treat: S/p right knee replacement 9/30 by Dr. [facility]. Just finished home health and ready for outpatient PT  Medical Diagnosis from Referral: Z96.651 (ICD-10-CM) - S/P TKR (total knee replacement), right  Evaluation Date: 10/24/2019  Authorization Period Expiration: 12/31/19  Plan of Care Expiration: 12/20/19  Visit # / Visits authorized: 12/ 20     G code: 12/10  Visit amount: 60.64  Total amount: 1028.10    Time In: 1000  Time Out: 1055   Total Billable Time: 25 minutes    Precautions: Standard    Subjective     Pt reports: Her knees feel the same as last therapy session and she is not in pain today.   She was compliant with home exercise program.  Response to previous treatment: no adverse effects  Functional change: improved gait    Pain: 0/10  Location: right knee      Objective       Celestine received assessment and  therapeutic exercises to develop strength, ROM and flexibility for 50 minutes including:    Bike x 5' -   Supine hamstring stretch 3x30" w/strap    gastroc stretch on wedge 3x30"   Heel slides 5"x20 w/strap -   Quad sets towel under ankle 5"x20 - not performed  SLR 2x10 -   SL hip abduction 2x10 R - not performed  Hamstring curls GTB 2x10   LAQ 3x10 2" hold 1# -   Step ups 2x10 L2 R -  Standing heel raises 3x10 -   TKE OTC 3x10 3" hold   Shuttle 3x10 2.0c DL  Step down L2 x10     Celestine participated in gait training for 0 minutes. The following activities were included:  Gait training in clinic with no AD around/over obstacles and stepping up/down on L2 step and walking on foam pads       Celestine participated in neuromuscular " re-education activities  for 5 minutes. The following activities were included:  Steamboats 2x10 B fingertips only blue foam  -     Home Exercises Provided and Patient Education Provided     Education provided:   - Continue with HEP     Written Home Exercises Provided: Patient instructed to cont prior HEP.  Exercises were reviewed and Celestine was able to demonstrate them prior to the end of the session.  Celestine demonstrated good  understanding of the education provided.     See EMR under Patient Instructions for exercises provided 11/7/2019 and initial evaluation.      Assessment   Patient was progressed with lower extremity strengthening in regard to shuttle exercise. Patient was without increased pain throughout therapy session. Patient with increased difficulty performing step down exercise and needed UE assist.  Celestine is progressing well towards her goals.   Pt prognosis is Good.     Pt will continue to benefit from skilled outpatient physical therapy to address the deficits listed in the problem list box on initial evaluation, provide pt/family education and to maximize pt's level of independence in the home and community environment.     Pt's spiritual, cultural and educational needs considered and pt agreeable to plan of care and goals.    Anticipated barriers to physical therapy: pain    Goals:     Short Term Goals: 4 weeks     1. Pt will be independent with HEP supplement PT in improving functional mobility. (Met)   2. Pt will improve (R) LE strength to at least 75% of (L) LE strength as measured via [employer] handheld dynamometer in order to improve functional mobility (Met)         Long Term Goals:  1. Pt will improve (R) LE strength to at least 90% of (L) LE strength as measured via [employer] handheld dynamometer in order to improve functional mobility (progressing not met)   2. Pt will improve (R) knee AROM to at least 0-120 degrees in order to improve gait and ability to perform ADLs (progressing not Met)    3. Pt will improve FOTO knee survey score to </= 35% limited in order to demo improved functional mobility (progressing not met)   4. Pt will perform TUG in < 10 seconds without AD in order to demo improved gait speed (progressing not met)   5. Pt will perform at least 14 sit to stands without UE support on 30 second sit to stand test in order to demo improved ability to perform transfers (progressing not met)       Plan     Continue per POC, progress as tolerated    Scott Jeffery, PT

## 2019-12-11 ENCOUNTER — CLINICAL SUPPORT (OUTPATIENT)
Dept: REHABILITATION | Facility: HOSPITAL | Age: 74
End: 2019-12-11
Payer: MEDICARE

## 2019-12-11 DIAGNOSIS — R53.1 DECREASED STRENGTH: ICD-10-CM

## 2019-12-11 DIAGNOSIS — R26.89 IMPAIRED GAIT AND MOBILITY: ICD-10-CM

## 2019-12-11 DIAGNOSIS — G89.29 CHRONIC PAIN OF RIGHT KNEE: ICD-10-CM

## 2019-12-11 DIAGNOSIS — M25.661 DECREASED RANGE OF MOTION OF RIGHT KNEE: ICD-10-CM

## 2019-12-11 DIAGNOSIS — M25.561 CHRONIC PAIN OF RIGHT KNEE: ICD-10-CM

## 2019-12-11 PROCEDURE — 97116 GAIT TRAINING THERAPY: CPT

## 2019-12-11 PROCEDURE — 97112 NEUROMUSCULAR REEDUCATION: CPT

## 2019-12-11 PROCEDURE — 97110 THERAPEUTIC EXERCISES: CPT

## 2019-12-11 NOTE — PROGRESS NOTES
"  Physical Therapy Daily Treatment Note     Name: Celestine Diallo  Clinic Number: [id number]    Therapy Diagnosis:   Encounter Diagnoses   Name Primary?    Chronic pain of right knee     Decreased range of motion of right knee     Decreased strength     Impaired gait and mobility      Physician: Radha Ruano NP    Visit Date: 12/11/2019    Physician Orders: PT Eval and Treat: S/p right knee replacement 9/30 by Dr[facility]. Just finished home health and ready for outpatient PT  Medical Diagnosis from Referral: Z96.651 (ICD-10-CM) - S/P TKR (total knee replacement), right  Evaluation Date: 10/24/2019  Authorization Period Expiration: 12/31/19  Plan of Care Expiration: 12/20/19  Visit # / Visits authorized: 13/ 20     G code: 13  Visit amount: 125.09  Total amount: 1153.19    Time In: 10:03 am   Time Out: 11:01 am   Total Billable Time: 53 minutes    Precautions: Standard    Subjective     Pt reports: that in her home she does not use SPC, but still uses SPC when ambulating outside of her home just in case.   She was compliant with home exercise program.  Response to previous treatment: no adverse effects  Functional change: improved gait    Pain: 0/10  Location: right knee      Objective         Celestine received therapeutic exercises to develop strength, ROM and flexibility for 41 minutes including:    Bike x 5' - supervised   Supine hamstring stretch 3x30" w/strap    gastroc stretch on wedge 3x30"   Heel slides 5"x20 w/strap - not performed  Hamstring curls GTB 2x10   LAQ 3x10 2" hold 1# - not performed   Step ups 2x10 L3 R -  Standing heel raises 3x10 -   TKE OTC 3x10 3" hold   Shuttle 3x10 2.0c DL  Step down L2 2x10     Celestine participated in gait training for 9 minutes. The following activities were included:  Gait training in clinic with no AD around/over obstacles and stepping up/down on L2 and L3 step and walking on foam pads forward and lateral       Celestine participated in neuromuscular re-education activities "  for 8 minutes. The following activities were included:  Steamboats 2x10 B fingertips only blue foam  1#      Home Exercises Provided and Patient Education Provided     Education provided:   - Continue with HEP     Written Home Exercises Provided: Patient instructed to cont prior HEP.  Exercises were reviewed and Celestine was able to demonstrate them prior to the end of the session.  Celestine demonstrated good  understanding of the education provided.     See EMR under Patient Instructions for exercises provided 11/7/2019 and initial evaluation.      Assessment     Pt was able to perform gait training noted above forward and laterally with CGA/SBA, no AD, and no LOB episodes. Pt was able to tolerate increased resistance for steamboats today. Pt tolerated therapy session without any adverse reactions.   Celestine is progressing well towards her goals.   Pt prognosis is Good.     Pt will continue to benefit from skilled outpatient physical therapy to address the deficits listed in the problem list box on initial evaluation, provide pt/family education and to maximize pt's level of independence in the home and community environment.     Pt's spiritual, cultural and educational needs considered and pt agreeable to plan of care and goals.    Anticipated barriers to physical therapy: pain    Goals:     Short Term Goals: 4 weeks     1. Pt will be independent with HEP supplement PT in improving functional mobility. (Met)   2. Pt will improve (R) LE strength to at least 75% of (L) LE strength as measured via MicroFet handheld dynamometer in order to improve functional mobility (Met)         Long Term Goals:  1. Pt will improve (R) LE strength to at least 90% of (L) LE strength as measured via MicroFet handheld dynamometer in order to improve functional mobility (progressing not met)   2. Pt will improve (R) knee AROM to at least 0-120 degrees in order to improve gait and ability to perform ADLs (progressing not Met)   3. Pt will  improve FOTO knee survey score to </= 35% limited in order to demo improved functional mobility (progressing not met)   4. Pt will perform TUG in < 10 seconds without AD in order to demo improved gait speed (progressing not met)   5. Pt will perform at least 14 sit to stands without UE support on 30 second sit to stand test in order to demo improved ability to perform transfers (progressing not met)       Plan     Continue per POC, progress as tolerated    Farhana Duque, PT

## 2019-12-13 ENCOUNTER — CLINICAL SUPPORT (OUTPATIENT)
Dept: REHABILITATION | Facility: HOSPITAL | Age: 74
End: 2019-12-13
Payer: MEDICARE

## 2019-12-13 DIAGNOSIS — M25.661 DECREASED RANGE OF MOTION OF RIGHT KNEE: ICD-10-CM

## 2019-12-13 DIAGNOSIS — R26.89 IMPAIRED GAIT AND MOBILITY: ICD-10-CM

## 2019-12-13 DIAGNOSIS — G89.29 CHRONIC PAIN OF RIGHT KNEE: ICD-10-CM

## 2019-12-13 DIAGNOSIS — M25.561 CHRONIC PAIN OF RIGHT KNEE: ICD-10-CM

## 2019-12-13 DIAGNOSIS — R53.1 DECREASED STRENGTH: ICD-10-CM

## 2019-12-13 PROCEDURE — 97112 NEUROMUSCULAR REEDUCATION: CPT

## 2019-12-13 PROCEDURE — 97110 THERAPEUTIC EXERCISES: CPT

## 2019-12-13 NOTE — PROGRESS NOTES
"  Physical Therapy Daily Treatment Note     Name: Celestine Diallo  Clinic Number: [id number]    Therapy Diagnosis:   Encounter Diagnoses   Name Primary?    Chronic pain of right knee     Decreased range of motion of right knee     Decreased strength     Impaired gait and mobility      Physician: Radha Ruano NP    Visit Date: 12/13/2019    Physician Orders: PT Eval and Treat: S/p right knee replacement 9/30 by Dr[facility]. Just finished home health and ready for outpatient PT  Medical Diagnosis from Referral: Z96.651 (ICD-10-CM) - S/P TKR (total knee replacement), right  Evaluation Date: 10/24/2019  Authorization Period Expiration: 12/31/19  Plan of Care Expiration: 12/20/19  Visit # / Visits authorized: 14/ 20     G code: 14  Visit amount: 95.11  Total amount: 1248.30    Time In: 10:00 am   Time Out: 11:01 am   Total Billable Time: 39 minutes    Precautions: Standard    Subjective     Pt reports: still just using SPC when out of her home just in case she needs it.  She was compliant with home exercise program.  Response to previous treatment: no adverse effects  Functional change: improved gait     Pain: 0/10  Location: right knee      Objective       Celestine received therapeutic exercises to develop strength, ROM and flexibility for 46 minutes including:    Bike x 5' - supervised   Supine hamstring stretch 3x30" w/strap    gastroc stretch on wedge 3x30"   Heel slides 5"x20 w/strap - not performed  Hamstring curls GTB 2x10 -not performed  LAQ 3x10 2" hold 1# - not performed   Step ups 2x10 L3 R -  Standing heel raises 3x10 -   TKE OTC 3x10 3" hold   Shuttle 2x10 2.5c DL  Step down L2 2x10     Celestine participated in gait training for 6 minutes. The following activities were included:  Gait training in clinic with no AD around/over obstacles and stepping up/down on L2 and L3 step and walking on foam pads forward and lateral       Celestine participated in neuromuscular re-education activities  for 9 minutes. The " following activities were included:  Steamboats 2x10 B fingertips only blue foam  1#    Home Exercises Provided and Patient Education Provided     Education provided:   - Continue with HEP     Written Home Exercises Provided: Patient instructed to cont prior HEP.  Exercises were reviewed and Celestine was able to demonstrate them prior to the end of the session.  Celestine demonstrated good  understanding of the education provided.     See EMR under Patient Instructions for exercises provided 11/7/2019 and initial evaluation..      Assessment     During gait training today pt was able to step over taller cones without any LOB episodes. Pt was able to tolerate increased resistance for shuttle therex today. Pt tolerated therapy session without any adverse reactions. Pt has been making steady progress and is nearing discharge.   Celestine is progressing well towards her goals.   Pt prognosis is Good.     Pt will continue to benefit from skilled outpatient physical therapy to address the deficits listed in the problem list box on initial evaluation, provide pt/family education and to maximize pt's level of independence in the home and community environment.     Pt's spiritual, cultural and educational needs considered and pt agreeable to plan of care and goals.    Anticipated barriers to physical therapy: pain    Goals:     Short Term Goals: 4 weeks     1. Pt will be independent with HEP supplement PT in improving functional mobility. (Met)   2. Pt will improve (R) LE strength to at least 75% of (L) LE strength as measured via MicroFet handheld dynamometer in order to improve functional mobility (Met)         Long Term Goals:  1. Pt will improve (R) LE strength to at least 90% of (L) LE strength as measured via MicroFet handheld dynamometer in order to improve functional mobility (progressing not met)   2. Pt will improve (R) knee AROM to at least 0-120 degrees in order to improve gait and ability to perform ADLs (progressing not  Met)   3. Pt will improve FOTO knee survey score to </= 35% limited in order to demo improved functional mobility (progressing not met)   4. Pt will perform TUG in < 10 seconds without AD in order to demo improved gait speed (progressing not met)   5. Pt will perform at least 14 sit to stands without UE support on 30 second sit to stand test in order to demo improved ability to perform transfers (progressing not met)     Plan     Continue per POC, progress as tolerated     Farhana Duque, PT

## 2019-12-16 ENCOUNTER — CLINICAL SUPPORT (OUTPATIENT)
Dept: REHABILITATION | Facility: HOSPITAL | Age: 74
End: 2019-12-16
Payer: MEDICARE

## 2019-12-16 DIAGNOSIS — R53.1 DECREASED STRENGTH: ICD-10-CM

## 2019-12-16 DIAGNOSIS — G89.29 CHRONIC PAIN OF RIGHT KNEE: ICD-10-CM

## 2019-12-16 DIAGNOSIS — M25.661 DECREASED RANGE OF MOTION OF RIGHT KNEE: ICD-10-CM

## 2019-12-16 DIAGNOSIS — R26.89 IMPAIRED GAIT AND MOBILITY: ICD-10-CM

## 2019-12-16 DIAGNOSIS — M25.561 CHRONIC PAIN OF RIGHT KNEE: ICD-10-CM

## 2019-12-16 PROCEDURE — 97112 NEUROMUSCULAR REEDUCATION: CPT

## 2019-12-16 PROCEDURE — 97110 THERAPEUTIC EXERCISES: CPT

## 2019-12-16 NOTE — PROGRESS NOTES
"  Physical Therapy Daily Treatment Note     Name: Celestine Diallo  Clinic Number: [id number]    Therapy Diagnosis:   Encounter Diagnoses   Name Primary?    Chronic pain of right knee     Decreased range of motion of right knee     Decreased strength     Impaired gait and mobility      Physician: Radha Ruano NP    Visit Date: 12/16/2019    Physician Orders: PT Eval and Treat: S/p right knee replacement 9/30 by Dr[facility]. Just finished home health and ready for outpatient PT  Medical Diagnosis from Referral: Z96.651 (ICD-10-CM) - S/P TKR (total knee replacement), right  Evaluation Date: 10/24/2019  Authorization Period Expiration: 12/31/19  Plan of Care Expiration: 12/20/19  Visit # / Visits authorized: 14/ 20     G code: 14  Visit amount: 64.79  Total amount: 1313.09    Time In: 10:01 am   Time Out: 11:00 am   Total Billable Time: 30 minutes    Precautions: Standard    Subjective     Pt reports: no new complaints, still just using SPC outside of her home just in case  She was compliant with home exercise program.  Response to previous treatment: no adverse effect  Functional change: improved gait    Pain: 0/10  Location: right knee      Objective       Celestine received therapeutic exercises to develop strength, ROM and flexibility for 44 minutes including:    Bike x 5' - supervised   Supine hamstring stretch 3x30" w/strap    gastroc stretch on wedge 3x30"   Heel slides 5"x20 w/strap - not performed  Hamstring curls GTB 2x10   LAQ 3x10 2" hold 1# - not performed   Step ups 2x10 L3 R -  Standing heel raises 3x10 -   TKE OTC 3x10 3" hold   Shuttle 2x10 2.5c DL  Step down L2 2x10     Celestine participated in gait training for 7 minutes. The following activities were included:  Gait training in clinic with no AD around/over obstacles and stepping up/down on L2 and L3 step and walking on foam pads forward and lateral       Celestine participated in neuromuscular re-education activities  for 8 minutes. The following " activities were included:  Steamboats 2x10 B fingertips only blue foam  1#    Home Exercises Provided and Patient Education Provided     Education provided:   - Continue with HEP     Written Home Exercises Provided: Patient instructed to cont prior HEP.  Exercises were reviewed and Celestine was able to demonstrate them prior to the end of the session.  Celestine demonstrated good  understanding of the education provided.     See EMR under Patient Instructions for exercises provided 11/7/2019 and initial evaluation.      Assessment     Pt was able to again perform gait training without any LOB episodes and without AD. Pt tolerated all therex/activities without c/o increased pain or adverse reactions. Pt may be appropriate for DC next visit.   Celestine is progressing well towards her goals.   Pt prognosis is Good.     Pt will continue to benefit from skilled outpatient physical therapy to address the deficits listed in the problem list box on initial evaluation, provide pt/family education and to maximize pt's level of independence in the home and community environment.     Pt's spiritual, cultural and educational needs considered and pt agreeable to plan of care and goals.    Anticipated barriers to physical therapy: pain    Goals:     Short Term Goals: 4 weeks     1. Pt will be independent with HEP supplement PT in improving functional mobility. (Met)   2. Pt will improve (R) LE strength to at least 75% of (L) LE strength as measured via MicroFet handheld dynamometer in order to improve functional mobility (Met)         Long Term Goals:  1. Pt will improve (R) LE strength to at least 90% of (L) LE strength as measured via MicroFet handheld dynamometer in order to improve functional mobility (progressing not met)   2. Pt will improve (R) knee AROM to at least 0-120 degrees in order to improve gait and ability to perform ADLs (progressing not Met)   3. Pt will improve FOTO knee survey score to </= 35% limited in order to demo  improved functional mobility (progressing not met)   4. Pt will perform TUG in < 10 seconds without AD in order to demo improved gait speed (progressing not met)   5. Pt will perform at least 14 sit to stands without UE support on 30 second sit to stand test in order to demo improved ability to perform transfers (progressing not met)       Plan     Continue per POC, progress as tolerated, possible DC next visit     Farhana Duque, PT

## 2019-12-18 ENCOUNTER — CLINICAL SUPPORT (OUTPATIENT)
Dept: REHABILITATION | Facility: HOSPITAL | Age: 74
End: 2019-12-18
Payer: MEDICARE

## 2019-12-18 DIAGNOSIS — G89.29 CHRONIC PAIN OF RIGHT KNEE: ICD-10-CM

## 2019-12-18 DIAGNOSIS — M25.561 CHRONIC PAIN OF RIGHT KNEE: ICD-10-CM

## 2019-12-18 DIAGNOSIS — R26.89 IMPAIRED GAIT AND MOBILITY: ICD-10-CM

## 2019-12-18 DIAGNOSIS — M25.661 DECREASED RANGE OF MOTION OF RIGHT KNEE: ICD-10-CM

## 2019-12-18 DIAGNOSIS — R53.1 DECREASED STRENGTH: ICD-10-CM

## 2019-12-18 PROCEDURE — 97110 THERAPEUTIC EXERCISES: CPT

## 2019-12-18 PROCEDURE — 97112 NEUROMUSCULAR REEDUCATION: CPT

## 2019-12-18 PROCEDURE — G8979 MOBILITY GOAL STATUS: HCPCS | Mod: CJ

## 2019-12-18 PROCEDURE — G8980 MOBILITY D/C STATUS: HCPCS | Mod: CK

## 2019-12-18 NOTE — PROGRESS NOTES
"  Physical Therapy Daily Treatment Note/Discharge Summary      Name: Celestine Diallo  Clinic Number: [id number]    Therapy Diagnosis:   Encounter Diagnoses   Name Primary?    Chronic pain of right knee     Decreased range of motion of right knee     Decreased strength     Impaired gait and mobility      Physician: Radha Ruano NP    Visit Date: 12/18/2019    Physician Orders: PT Eval and Treat: S/p right knee replacement 9/30 by Dr[facility]. Just finished home health and ready for outpatient PT  Medical Diagnosis from Referral: Z96.651 (ICD-10-CM) - S/P TKR (total knee replacement), right  Evaluation Date: 10/24/2019  Authorization Period Expiration: 12/31/19  Plan of Care Expiration: 12/20/19  Visit # / Visits authorized: 17/ 20 G code: 17  Visit amount: 125.43  Total amount: 1438.52    Time In: 10:01 am   Time Out: 11:00 am   Total Billable Time: 54 minutes    Precautions: Standard    Subjective     Pt reports: agreeable to discharging today.   She was compliant with home exercise program.  Response to previous treatment: no adverse effects  Functional change: improved gait    Pain: 0/10  Location: right knee      Objective       Celestine received assessment and therapeutic exercises to develop strength, ROM and flexibility for 51 minutes including:    Bike x 5' - supervised   Supine hamstring stretch 3x30" w/strap    gastroc stretch on wedge 3x30" - not performed  Heel slides 5"x20 w/strap - not performed  Hamstring curls GTB 2x10   LAQ 3x10 2" hold GTB  Step ups 2x10 L3 R -not performed  Standing heel raises 3x10 -   TKE OTC 3x10 3" hold   Shuttle 2x10 2.5c DL - not performed  Step down L2 2x10  - not performed     Knee  Right  Left Pain/Dysfunction with Movement     AROM AROM NT = not tested   Flexion 125 140     Extension 3 lacking 0           L/E Strength w/ MicroFET Muscle Caitlin Dynamometer Right Left Pain/Dysfunction with Movement   (approx 4 sec hold w/ max contraction)   Hip Flexion 19.7 kg  20.7 kg "      Hip Abduction 26.7 kg  24.5 kg      Quadriceps 25.1 kg  21.8 kg      Hamstrings 23.4 kg  24.4kg         TU seconds w/o AD     30 Second sit to stand test: 10 w/o UE support     FOTO: 42% limited     Nhanzay participated in neuromuscular re-education activities  for 8 minutes. The following activities were included:  Steamboats 3x10 B fingertips only blue foam  GTB       Home Exercises Provided and Patient Education Provided     Education provided:   - Educated pt on [facility]. Pt stated that she is not able to participate in [facility] at this time  - HEP    Written Home Exercises Provided: yes.  Exercises were reviewed and Celestine was able to demonstrate them prior to the end of the session.  Celestine demonstrated good  understanding of the education provided.     See EMR under Patient Instructions for exercises provided 2019.      Assessment     Since initial evaluation on 10/24/19 pt has attended 16 PT follow up sessions. Pt has made steady progress with her PT treatments as evident by subjective and objective improvements. Pt demo improved (R) knee AROM, improved LE strength, improved TUG score, improved 30 second sit to stand score, and improved FOTO score compared to initial evaluation. Pt was agreeable to being discharged with HEP at this time.       Pt's spiritual, cultural and educational needs considered and pt agreeable to plan of care and goals.    Goals:     Short Term Goals: 4 weeks     1. Pt will be independent with HEP supplement PT in improving functional mobility. (Met)   2. Pt will improve (R) LE strength to at least 75% of (L) LE strength as measured via MicroFet handheld dynamometer in order to improve functional mobility (Met)         Long Term Goals:  1. Pt will improve (R) LE strength to at least 90% of (L) LE strength as measured via MicroFet handheld dynamometer in order to improve functional mobility (Met)   2. Pt will improve (R)  knee AROM to at least 0-120 degrees in order to improve gait and ability to perform ADLs (Partially Met)  3. Pt will improve FOTO knee survey score to </= 35% limited in order to demo improved functional mobility (Not Met)   4. Pt will perform TUG in < 10 seconds without AD in order to demo improved gait speed (Not Met)   5. Pt will perform at least 14 sit to stands without UE support on 30 second sit to stand test in order to demo improved ability to perform transfers (Not Met)     Plan     Discharge from PT    Farhana Duque PT

## 2019-12-24 ENCOUNTER — OFFICE VISIT (OUTPATIENT)
Dept: ORTHOPEDICS | Facility: CLINIC | Age: 74
End: 2019-12-24
Payer: MEDICARE

## 2019-12-24 VITALS — BODY MASS INDEX: 26.22 KG/M2 | HEIGHT: 62 IN | WEIGHT: 142.5 LBS

## 2019-12-24 DIAGNOSIS — Z96.651 STATUS POST TOTAL RIGHT KNEE REPLACEMENT: Primary | ICD-10-CM

## 2019-12-24 PROCEDURE — 99999 PR PBB SHADOW E&M-EST. PATIENT-LVL III: CPT | Mod: PBBFAC,,, | Performed by: ORTHOPAEDIC SURGERY

## 2019-12-24 PROCEDURE — 99024 PR POST-OP FOLLOW-UP VISIT: ICD-10-PCS | Mod: S$GLB,,, | Performed by: ORTHOPAEDIC SURGERY

## 2019-12-24 PROCEDURE — 99024 POSTOP FOLLOW-UP VISIT: CPT | Mod: S$GLB,,, | Performed by: ORTHOPAEDIC SURGERY

## 2019-12-24 PROCEDURE — 99999 PR PBB SHADOW E&M-EST. PATIENT-LVL III: ICD-10-PCS | Mod: PBBFAC,,, | Performed by: ORTHOPAEDIC SURGERY

## 2019-12-24 NOTE — PROGRESS NOTES
Patient is here today for 12 week PO FU of her TKA on 9/30/19. She is progressing well.      We will allow her to return as needed for repeat radiographs and certainly for any other questions.    xrays are  reviewed today with good alignment.    We will check repeat radiographs in 1 year.

## 2020-02-19 DIAGNOSIS — M25.562 LEFT KNEE PAIN, UNSPECIFIED CHRONICITY: Primary | ICD-10-CM

## 2020-02-28 ENCOUNTER — HOSPITAL ENCOUNTER (OUTPATIENT)
Dept: RADIOLOGY | Facility: HOSPITAL | Age: 75
Discharge: HOME | End: 2020-02-28
Attending: PHYSICIAN ASSISTANT
Payer: MEDICARE

## 2020-02-28 ENCOUNTER — OFFICE VISIT (OUTPATIENT)
Dept: ORTHOPEDICS | Facility: CLINIC | Age: 75
End: 2020-02-28
Payer: MEDICARE

## 2020-02-28 VITALS
HEIGHT: 62 IN | SYSTOLIC BLOOD PRESSURE: 118 MMHG | TEMPERATURE: 98 F | DIASTOLIC BLOOD PRESSURE: 63 MMHG | BODY MASS INDEX: 26.25 KG/M2 | HEART RATE: 73 BPM | WEIGHT: 142.63 LBS

## 2020-02-28 DIAGNOSIS — M17.12 PRIMARY OSTEOARTHRITIS OF LEFT KNEE: Primary | ICD-10-CM

## 2020-02-28 DIAGNOSIS — M25.562 LEFT KNEE PAIN, UNSPECIFIED CHRONICITY: ICD-10-CM

## 2020-02-28 PROCEDURE — 1159F PR MEDICATION LIST DOCUMENTED IN MEDICAL RECORD: ICD-10-PCS | Mod: S$GLB,,, | Performed by: PHYSICIAN ASSISTANT

## 2020-02-28 PROCEDURE — 20610 PR DRAIN/INJECT LARGE JOINT/BURSA: ICD-10-PCS | Mod: LT,S$GLB,, | Performed by: PHYSICIAN ASSISTANT

## 2020-02-28 PROCEDURE — 73564 X-RAY EXAM KNEE 4 OR MORE: CPT | Mod: TC,LT

## 2020-02-28 PROCEDURE — 1125F AMNT PAIN NOTED PAIN PRSNT: CPT | Mod: S$GLB,,, | Performed by: PHYSICIAN ASSISTANT

## 2020-02-28 PROCEDURE — 1125F PR PAIN SEVERITY QUANTIFIED, PAIN PRESENT: ICD-10-PCS | Mod: S$GLB,,, | Performed by: PHYSICIAN ASSISTANT

## 2020-02-28 PROCEDURE — 73562 X-RAY EXAM OF KNEE 3: CPT | Mod: 26,59,RT, | Performed by: RADIOLOGY

## 2020-02-28 PROCEDURE — 99999 PR PBB SHADOW E&M-EST. PATIENT-LVL III: CPT | Mod: PBBFAC,,, | Performed by: PHYSICIAN ASSISTANT

## 2020-02-28 PROCEDURE — 99213 OFFICE O/P EST LOW 20 MIN: CPT | Mod: 25,S$GLB,, | Performed by: PHYSICIAN ASSISTANT

## 2020-02-28 PROCEDURE — 1101F PR PT FALLS ASSESS DOC 0-1 FALLS W/OUT INJ PAST YR: ICD-10-PCS | Mod: CPTII,S$GLB,, | Performed by: PHYSICIAN ASSISTANT

## 2020-02-28 PROCEDURE — 20610 DRAIN/INJ JOINT/BURSA W/O US: CPT | Mod: LT,S$GLB,, | Performed by: PHYSICIAN ASSISTANT

## 2020-02-28 PROCEDURE — 1101F PT FALLS ASSESS-DOCD LE1/YR: CPT | Mod: CPTII,S$GLB,, | Performed by: PHYSICIAN ASSISTANT

## 2020-02-28 PROCEDURE — 73564 X-RAY EXAM KNEE 4 OR MORE: CPT | Mod: 26,LT,, | Performed by: RADIOLOGY

## 2020-02-28 PROCEDURE — 1159F MED LIST DOCD IN RCRD: CPT | Mod: S$GLB,,, | Performed by: PHYSICIAN ASSISTANT

## 2020-02-28 PROCEDURE — 99999 PR PBB SHADOW E&M-EST. PATIENT-LVL III: ICD-10-PCS | Mod: PBBFAC,,, | Performed by: PHYSICIAN ASSISTANT

## 2020-02-28 PROCEDURE — 73562 XR KNEE ORTHO LEFT WITH FLEXION: ICD-10-PCS | Mod: 26,59,RT, | Performed by: RADIOLOGY

## 2020-02-28 PROCEDURE — 99213 PR OFFICE/OUTPT VISIT, EST, LEVL III, 20-29 MIN: ICD-10-PCS | Mod: 25,S$GLB,, | Performed by: PHYSICIAN ASSISTANT

## 2020-02-28 PROCEDURE — 73564 XR KNEE ORTHO LEFT WITH FLEXION: ICD-10-PCS | Mod: 26,LT,, | Performed by: RADIOLOGY

## 2020-02-28 RX ORDER — TRIAMCINOLONE ACETONIDE 40 MG/ML
60 INJECTION, SUSPENSION INTRA-ARTICULAR; INTRAMUSCULAR
Status: COMPLETED | OUTPATIENT
Start: 2020-02-28 | End: 2020-02-28

## 2020-02-28 RX ORDER — TRAMADOL HYDROCHLORIDE AND ACETAMINOPHEN 37.5; 325 MG/1; MG/1
1 TABLET, FILM COATED ORAL NIGHTLY PRN
Qty: 20 TABLET | Refills: 0 | Status: SHIPPED | OUTPATIENT
Start: 2020-02-28 | End: 2020-09-25 | Stop reason: SDUPTHER

## 2020-02-28 RX ADMIN — TRIAMCINOLONE ACETONIDE 60 MG: 40 INJECTION, SUSPENSION INTRA-ARTICULAR; INTRAMUSCULAR at 03:02

## 2020-02-28 NOTE — PROGRESS NOTES
"  SUBJECTIVE:     Chief Complaint : left knee pain    History of Present Illness:  Celestine Diallo is a 74 y.o. female seen in clinic today with a chief complaint of left knee pain. Patient had R TKA by Dr. [facility] 9/2019 and did very well. No right knee pain. She has advanced OA of left knee. Ten days ago she woke up with severe pain and could not bend knee or ambulate. Doing better now. She would like CSI. She had bilateral knee CSI 9/2018 and Synvisc 7/2019. She denies trauma, hip pain. She is using a cane.     Past Medical History:   Diagnosis Date    Arthritis      Review of Systems:  Constitutional: no fever or chills  ENT: no nasal congestion or sore throat  Respiratory: no cough or shortness of breath  Cardiovascular: no chest pain or palpitations  Gastrointestinal: no nausea or vomiting, tolerating diet    OBJECTIVE:     PHYSICAL EXAM:  Blood pressure 118/63, pulse 73, temperature 98 °F (36.7 °C), temperature source Oral, height 5' 2" (1.575 m), weight 64.7 kg (142 lb 10.2 oz).   General Appearance: WDWN, NAD  Gait: antalgic to left   Neuro/Psych: Mood & affect appropriate  Lungs: Respirations equal and unlabored.   CV: 2+ bilateral upper and lower extremity pulses.   Skin: Intact throughout LE  Extremities: No LE edema    Right Knee Exam  Range of Motion:0-125 active   Effusion:none  Condition of skin:intact and well healed incision   Location of tenderness:None     Left Knee Exam  Range of Motion:0-120 active   Effusion:none  Condition of skin:intact  Location of tenderness:Lateral joint line   Strength:4 of 5 quadriceps strength and 5 of 5 hamstring strength  Stability:stable to testing    Alignment: Moderate varus    Left Hip Examination: no pain with PROM     RADIOGRAPHS: AP, lateral and merchant left knee x-rays ordered and images reviewed today by me reveal advanced degenerative changes. Well fixed total knee right.    ASSESSMENT/PLAN:   Primary osteoarthritis left knee   - CSI left knee   - Continue " using cane   - She will call if injection does not help. Would like to wait several months to have L TKA.  - F/u prn.    Knee Injection Procedure Note  Diagnosis: left knee degenerative arthritis  Indications: left knee pain  Procedure Details: Verbal consent was obtained for the procedure. The injection site was identified and the skin was prepared with alcohol. The left knee was injected from an anterolateral approach with 1.5 ml of Kenalog and 2 ml Lidocaine under sterile technique using a 22 gauge needle. The needle was removed and the area cleansed and dressed.  Complications:  Patient tolerated the procedure well.    she was advised to rest the knee today, using ice and elevation as needed for comfort and swelling.Immediate relief of the knee pain may be short lived and secondary to the lidocaine. she may have an increase in discomfort tonight followed by steady improvement over the next several days. It may take 1-2 weeks following the injection to get the full benefit of the medication.

## 2020-09-25 ENCOUNTER — HOSPITAL ENCOUNTER (OUTPATIENT)
Dept: RADIOLOGY | Facility: HOSPITAL | Age: 75
Discharge: HOME | End: 2020-09-25
Attending: PHYSICIAN ASSISTANT
Payer: MEDICARE

## 2020-09-25 ENCOUNTER — OFFICE VISIT (OUTPATIENT)
Dept: ORTHOPEDICS | Facility: CLINIC | Age: 75
End: 2020-09-25
Payer: MEDICARE

## 2020-09-25 VITALS — WEIGHT: 140 LBS | HEIGHT: 62 IN | BODY MASS INDEX: 25.76 KG/M2

## 2020-09-25 DIAGNOSIS — Z96.651 S/P TKR (TOTAL KNEE REPLACEMENT), RIGHT: ICD-10-CM

## 2020-09-25 DIAGNOSIS — M17.12 PRIMARY OSTEOARTHRITIS OF LEFT KNEE: Primary | ICD-10-CM

## 2020-09-25 DIAGNOSIS — M17.12 PRIMARY OSTEOARTHRITIS OF LEFT KNEE: ICD-10-CM

## 2020-09-25 PROCEDURE — 1159F PR MEDICATION LIST DOCUMENTED IN MEDICAL RECORD: ICD-10-PCS | Mod: S$GLB,,, | Performed by: PHYSICIAN ASSISTANT

## 2020-09-25 PROCEDURE — 73564 X-RAY EXAM KNEE 4 OR MORE: CPT | Mod: 26,50,, | Performed by: RADIOLOGY

## 2020-09-25 PROCEDURE — 1101F PT FALLS ASSESS-DOCD LE1/YR: CPT | Mod: CPTII,S$GLB,, | Performed by: PHYSICIAN ASSISTANT

## 2020-09-25 PROCEDURE — 99213 PR OFFICE/OUTPT VISIT, EST, LEVL III, 20-29 MIN: ICD-10-PCS | Mod: S$GLB,,, | Performed by: PHYSICIAN ASSISTANT

## 2020-09-25 PROCEDURE — 1126F PR PAIN SEVERITY QUANTIFIED, NO PAIN PRESENT: ICD-10-PCS | Mod: S$GLB,,, | Performed by: PHYSICIAN ASSISTANT

## 2020-09-25 PROCEDURE — 73564 XR KNEE ORTHO BILAT WITH FLEXION: ICD-10-PCS | Mod: 26,50,, | Performed by: RADIOLOGY

## 2020-09-25 PROCEDURE — 99213 OFFICE O/P EST LOW 20 MIN: CPT | Mod: S$GLB,,, | Performed by: PHYSICIAN ASSISTANT

## 2020-09-25 PROCEDURE — 1159F MED LIST DOCD IN RCRD: CPT | Mod: S$GLB,,, | Performed by: PHYSICIAN ASSISTANT

## 2020-09-25 PROCEDURE — 99999 PR PBB SHADOW E&M-EST. PATIENT-LVL III: ICD-10-PCS | Mod: PBBFAC,,, | Performed by: PHYSICIAN ASSISTANT

## 2020-09-25 PROCEDURE — 3008F BODY MASS INDEX DOCD: CPT | Mod: CPTII,S$GLB,, | Performed by: PHYSICIAN ASSISTANT

## 2020-09-25 PROCEDURE — 3008F PR BODY MASS INDEX (BMI) DOCUMENTED: ICD-10-PCS | Mod: CPTII,S$GLB,, | Performed by: PHYSICIAN ASSISTANT

## 2020-09-25 PROCEDURE — 1101F PR PT FALLS ASSESS DOC 0-1 FALLS W/OUT INJ PAST YR: ICD-10-PCS | Mod: CPTII,S$GLB,, | Performed by: PHYSICIAN ASSISTANT

## 2020-09-25 PROCEDURE — 73564 X-RAY EXAM KNEE 4 OR MORE: CPT | Mod: TC,50

## 2020-09-25 PROCEDURE — 99999 PR PBB SHADOW E&M-EST. PATIENT-LVL III: CPT | Mod: PBBFAC,,, | Performed by: PHYSICIAN ASSISTANT

## 2020-09-25 PROCEDURE — 1126F AMNT PAIN NOTED NONE PRSNT: CPT | Mod: S$GLB,,, | Performed by: PHYSICIAN ASSISTANT

## 2020-09-25 RX ORDER — TRAMADOL HYDROCHLORIDE AND ACETAMINOPHEN 37.5; 325 MG/1; MG/1
1 TABLET, FILM COATED ORAL NIGHTLY PRN
Qty: 20 TABLET | Refills: 0 | Status: SHIPPED | OUTPATIENT
Start: 2020-09-25 | End: 2022-09-02

## 2020-09-25 NOTE — PROGRESS NOTES
"  SUBJECTIVE:     Chief Complaint : left knee pain, one year follow up right knee     History of Present Illness:  Celestine Diallo is a 74 y.o. female seen in clinic today with a chief complaint of left knee pain. Patient had R TKA by Dr[facility] 9/2019 and did very well. No right knee pain. She is very happy with R TKA. She has advanced OA of left knee. She had left knee CSI 2/28/2020 which relieved her pain and is still working now. She had Synvisc series 7/2019. She denies trauma, hip pain. She is using a cane for the left knee when she walks long distances. She is helping care for her twin one year old granddaughters.     Past Medical History:   Diagnosis Date    Arthritis      Review of Systems:  Constitutional: no fever or chills  ENT: no nasal congestion or sore throat  Respiratory: no cough or shortness of breath  Cardiovascular: no chest pain or palpitations  Gastrointestinal: no nausea or vomiting, tolerating diet    OBJECTIVE:     PHYSICAL EXAM:  Height 5' 2" (1.575 m), weight 63.5 kg (139 lb 15.9 oz).   General Appearance: WDWN, NAD  Gait: antalgic to left   Neuro/Psych: Mood & affect appropriate  Lungs: Respirations equal and unlabored.   CV: 2+ bilateral upper and lower extremity pulses.   Skin: Intact throughout LE  Extremities: No LE edema    Right Knee Exam  Range of Motion:0-125 active   Effusion:none  Condition of skin:intact and well healed incision   Location of tenderness:None     Left Knee Exam  Range of Motion:0-120 active   Effusion:none  Condition of skin:intact  Location of tenderness:Lateral joint line   Strength:4 of 5 quadriceps strength and 5 of 5 hamstring strength  Stability:stable to testing    Alignment: Moderate varus    Left Hip Examination: no pain with PROM     RADIOGRAPHS: AP, lateral and merchant left knee x-rays ordered and images reviewed today by me reveal advanced degenerative changes of left knee.  Well fixed total knee right.    ASSESSMENT/PLAN:   One year post op right " total knee  - Surveys and xray completed   - F/u annually or sooner prn.     Primary osteoarthritis left knee   - Continue using cane   - CSI helped. She will call back if she would like to repeat when pain returns   - F/u prn.

## 2021-04-15 ENCOUNTER — OFFICE VISIT (OUTPATIENT)
Dept: ORTHOPEDICS | Facility: CLINIC | Age: 76
End: 2021-04-15
Payer: MEDICARE

## 2021-04-15 VITALS — WEIGHT: 143.31 LBS | BODY MASS INDEX: 26.37 KG/M2 | HEIGHT: 62 IN

## 2021-04-15 DIAGNOSIS — M17.12 PRIMARY OSTEOARTHRITIS OF LEFT KNEE: Primary | ICD-10-CM

## 2021-04-15 PROCEDURE — 1159F PR MEDICATION LIST DOCUMENTED IN MEDICAL RECORD: ICD-10-PCS | Mod: S$GLB,,, | Performed by: PHYSICIAN ASSISTANT

## 2021-04-15 PROCEDURE — 1125F PR PAIN SEVERITY QUANTIFIED, PAIN PRESENT: ICD-10-PCS | Mod: S$GLB,,, | Performed by: PHYSICIAN ASSISTANT

## 2021-04-15 PROCEDURE — 3008F BODY MASS INDEX DOCD: CPT | Mod: CPTII,S$GLB,, | Performed by: PHYSICIAN ASSISTANT

## 2021-04-15 PROCEDURE — 99213 OFFICE O/P EST LOW 20 MIN: CPT | Mod: 25,S$GLB,, | Performed by: PHYSICIAN ASSISTANT

## 2021-04-15 PROCEDURE — 20610 DRAIN/INJ JOINT/BURSA W/O US: CPT | Mod: LT,S$GLB,, | Performed by: PHYSICIAN ASSISTANT

## 2021-04-15 PROCEDURE — 1101F PT FALLS ASSESS-DOCD LE1/YR: CPT | Mod: CPTII,S$GLB,, | Performed by: PHYSICIAN ASSISTANT

## 2021-04-15 PROCEDURE — 1125F AMNT PAIN NOTED PAIN PRSNT: CPT | Mod: S$GLB,,, | Performed by: PHYSICIAN ASSISTANT

## 2021-04-15 PROCEDURE — 99999 PR PBB SHADOW E&M-EST. PATIENT-LVL III: CPT | Mod: PBBFAC,,, | Performed by: PHYSICIAN ASSISTANT

## 2021-04-15 PROCEDURE — 1159F MED LIST DOCD IN RCRD: CPT | Mod: S$GLB,,, | Performed by: PHYSICIAN ASSISTANT

## 2021-04-15 PROCEDURE — 1101F PR PT FALLS ASSESS DOC 0-1 FALLS W/OUT INJ PAST YR: ICD-10-PCS | Mod: CPTII,S$GLB,, | Performed by: PHYSICIAN ASSISTANT

## 2021-04-15 PROCEDURE — 3288F PR FALLS RISK ASSESSMENT DOCUMENTED: ICD-10-PCS | Mod: CPTII,S$GLB,, | Performed by: PHYSICIAN ASSISTANT

## 2021-04-15 PROCEDURE — 3288F FALL RISK ASSESSMENT DOCD: CPT | Mod: CPTII,S$GLB,, | Performed by: PHYSICIAN ASSISTANT

## 2021-04-15 PROCEDURE — 20610 PR DRAIN/INJECT LARGE JOINT/BURSA: ICD-10-PCS | Mod: LT,S$GLB,, | Performed by: PHYSICIAN ASSISTANT

## 2021-04-15 PROCEDURE — 99213 PR OFFICE/OUTPT VISIT, EST, LEVL III, 20-29 MIN: ICD-10-PCS | Mod: 25,S$GLB,, | Performed by: PHYSICIAN ASSISTANT

## 2021-04-15 PROCEDURE — 99999 PR PBB SHADOW E&M-EST. PATIENT-LVL III: ICD-10-PCS | Mod: PBBFAC,,, | Performed by: PHYSICIAN ASSISTANT

## 2021-04-15 PROCEDURE — 3008F PR BODY MASS INDEX (BMI) DOCUMENTED: ICD-10-PCS | Mod: CPTII,S$GLB,, | Performed by: PHYSICIAN ASSISTANT

## 2021-04-15 RX ORDER — TRIAMCINOLONE ACETONIDE 40 MG/ML
40 INJECTION, SUSPENSION INTRA-ARTICULAR; INTRAMUSCULAR
Status: COMPLETED | OUTPATIENT
Start: 2021-04-15 | End: 2021-04-15

## 2021-04-15 RX ADMIN — TRIAMCINOLONE ACETONIDE 40 MG: 40 INJECTION, SUSPENSION INTRA-ARTICULAR; INTRAMUSCULAR at 08:04

## 2021-10-28 ENCOUNTER — OFFICE VISIT (OUTPATIENT)
Dept: ORTHOPEDICS | Facility: CLINIC | Age: 76
End: 2021-10-28
Payer: MEDICARE

## 2021-10-28 ENCOUNTER — HOSPITAL ENCOUNTER (OUTPATIENT)
Dept: RADIOLOGY | Facility: HOSPITAL | Age: 76
Discharge: HOME | End: 2021-10-28
Attending: PHYSICIAN ASSISTANT
Payer: MEDICARE

## 2021-10-28 VITALS — HEIGHT: 62 IN | WEIGHT: 143 LBS | BODY MASS INDEX: 26.31 KG/M2

## 2021-10-28 DIAGNOSIS — M17.12 PRIMARY OSTEOARTHRITIS OF LEFT KNEE: ICD-10-CM

## 2021-10-28 DIAGNOSIS — M17.12 PRIMARY OSTEOARTHRITIS OF LEFT KNEE: Primary | ICD-10-CM

## 2021-10-28 PROCEDURE — 73562 X-RAY EXAM OF KNEE 3: CPT | Mod: 26,RT,, | Performed by: RADIOLOGY

## 2021-10-28 PROCEDURE — 3008F BODY MASS INDEX DOCD: CPT | Mod: CPTII,S$GLB,, | Performed by: PHYSICIAN ASSISTANT

## 2021-10-28 PROCEDURE — 3008F PR BODY MASS INDEX (BMI) DOCUMENTED: ICD-10-PCS | Mod: CPTII,S$GLB,, | Performed by: PHYSICIAN ASSISTANT

## 2021-10-28 PROCEDURE — 1159F PR MEDICATION LIST DOCUMENTED IN MEDICAL RECORD: ICD-10-PCS | Mod: CPTII,S$GLB,, | Performed by: PHYSICIAN ASSISTANT

## 2021-10-28 PROCEDURE — 3288F FALL RISK ASSESSMENT DOCD: CPT | Mod: CPTII,S$GLB,, | Performed by: PHYSICIAN ASSISTANT

## 2021-10-28 PROCEDURE — 99999 PR PBB SHADOW E&M-EST. PATIENT-LVL III: ICD-10-PCS | Mod: PBBFAC,,, | Performed by: PHYSICIAN ASSISTANT

## 2021-10-28 PROCEDURE — 1159F MED LIST DOCD IN RCRD: CPT | Mod: CPTII,S$GLB,, | Performed by: PHYSICIAN ASSISTANT

## 2021-10-28 PROCEDURE — 3288F PR FALLS RISK ASSESSMENT DOCUMENTED: ICD-10-PCS | Mod: CPTII,S$GLB,, | Performed by: PHYSICIAN ASSISTANT

## 2021-10-28 PROCEDURE — 99213 PR OFFICE/OUTPT VISIT, EST, LEVL III, 20-29 MIN: ICD-10-PCS | Mod: 25,S$GLB,, | Performed by: PHYSICIAN ASSISTANT

## 2021-10-28 PROCEDURE — 99999 PR PBB SHADOW E&M-EST. PATIENT-LVL III: CPT | Mod: PBBFAC,,, | Performed by: PHYSICIAN ASSISTANT

## 2021-10-28 PROCEDURE — 1101F PR PT FALLS ASSESS DOC 0-1 FALLS W/OUT INJ PAST YR: ICD-10-PCS | Mod: CPTII,S$GLB,, | Performed by: PHYSICIAN ASSISTANT

## 2021-10-28 PROCEDURE — 1125F AMNT PAIN NOTED PAIN PRSNT: CPT | Mod: CPTII,S$GLB,, | Performed by: PHYSICIAN ASSISTANT

## 2021-10-28 PROCEDURE — 20610 DRAIN/INJ JOINT/BURSA W/O US: CPT | Mod: LT,S$GLB,, | Performed by: PHYSICIAN ASSISTANT

## 2021-10-28 PROCEDURE — 73564 XR KNEE ORTHO LEFT WITH FLEXION: ICD-10-PCS | Mod: 26,LT,, | Performed by: RADIOLOGY

## 2021-10-28 PROCEDURE — 73564 X-RAY EXAM KNEE 4 OR MORE: CPT | Mod: TC,LT

## 2021-10-28 PROCEDURE — 73562 XR KNEE ORTHO LEFT WITH FLEXION: ICD-10-PCS | Mod: 26,RT,, | Performed by: RADIOLOGY

## 2021-10-28 PROCEDURE — 99213 OFFICE O/P EST LOW 20 MIN: CPT | Mod: 25,S$GLB,, | Performed by: PHYSICIAN ASSISTANT

## 2021-10-28 PROCEDURE — 1101F PT FALLS ASSESS-DOCD LE1/YR: CPT | Mod: CPTII,S$GLB,, | Performed by: PHYSICIAN ASSISTANT

## 2021-10-28 PROCEDURE — 73564 X-RAY EXAM KNEE 4 OR MORE: CPT | Mod: 26,LT,, | Performed by: RADIOLOGY

## 2021-10-28 PROCEDURE — 1160F PR REVIEW ALL MEDS BY PRESCRIBER/CLIN PHARMACIST DOCUMENTED: ICD-10-PCS | Mod: CPTII,S$GLB,, | Performed by: PHYSICIAN ASSISTANT

## 2021-10-28 PROCEDURE — 1125F PR PAIN SEVERITY QUANTIFIED, PAIN PRESENT: ICD-10-PCS | Mod: CPTII,S$GLB,, | Performed by: PHYSICIAN ASSISTANT

## 2021-10-28 PROCEDURE — 20610 PR DRAIN/INJECT LARGE JOINT/BURSA: ICD-10-PCS | Mod: LT,S$GLB,, | Performed by: PHYSICIAN ASSISTANT

## 2021-10-28 PROCEDURE — 1160F RVW MEDS BY RX/DR IN RCRD: CPT | Mod: CPTII,S$GLB,, | Performed by: PHYSICIAN ASSISTANT

## 2021-10-28 RX ORDER — TRIAMCINOLONE ACETONIDE 40 MG/ML
40 INJECTION, SUSPENSION INTRA-ARTICULAR; INTRAMUSCULAR
Status: COMPLETED | OUTPATIENT
Start: 2021-10-28 | End: 2021-10-28

## 2021-10-28 RX ADMIN — TRIAMCINOLONE ACETONIDE 40 MG: 40 INJECTION, SUSPENSION INTRA-ARTICULAR; INTRAMUSCULAR at 09:10

## 2022-06-02 ENCOUNTER — OFFICE VISIT (OUTPATIENT)
Dept: ORTHOPEDICS | Facility: CLINIC | Age: 77
End: 2022-06-02
Payer: MEDICARE

## 2022-06-02 VITALS — WEIGHT: 138.88 LBS | HEIGHT: 62 IN | BODY MASS INDEX: 25.55 KG/M2

## 2022-06-02 DIAGNOSIS — M17.12 PRIMARY OSTEOARTHRITIS OF LEFT KNEE: Primary | ICD-10-CM

## 2022-06-02 PROCEDURE — 99213 OFFICE O/P EST LOW 20 MIN: CPT | Mod: 25,S$GLB,, | Performed by: PHYSICIAN ASSISTANT

## 2022-06-02 PROCEDURE — 3008F BODY MASS INDEX DOCD: CPT | Mod: CPTII,S$GLB,, | Performed by: PHYSICIAN ASSISTANT

## 2022-06-02 PROCEDURE — 1125F PR PAIN SEVERITY QUANTIFIED, PAIN PRESENT: ICD-10-PCS | Mod: CPTII,S$GLB,, | Performed by: PHYSICIAN ASSISTANT

## 2022-06-02 PROCEDURE — 3008F PR BODY MASS INDEX (BMI) DOCUMENTED: ICD-10-PCS | Mod: CPTII,S$GLB,, | Performed by: PHYSICIAN ASSISTANT

## 2022-06-02 PROCEDURE — 1101F PR PT FALLS ASSESS DOC 0-1 FALLS W/OUT INJ PAST YR: ICD-10-PCS | Mod: CPTII,S$GLB,, | Performed by: PHYSICIAN ASSISTANT

## 2022-06-02 PROCEDURE — 99999 PR PBB SHADOW E&M-EST. PATIENT-LVL II: ICD-10-PCS | Mod: PBBFAC,,, | Performed by: PHYSICIAN ASSISTANT

## 2022-06-02 PROCEDURE — 99999 PR PBB SHADOW E&M-EST. PATIENT-LVL II: CPT | Mod: PBBFAC,,, | Performed by: PHYSICIAN ASSISTANT

## 2022-06-02 PROCEDURE — 1125F AMNT PAIN NOTED PAIN PRSNT: CPT | Mod: CPTII,S$GLB,, | Performed by: PHYSICIAN ASSISTANT

## 2022-06-02 PROCEDURE — 1101F PT FALLS ASSESS-DOCD LE1/YR: CPT | Mod: CPTII,S$GLB,, | Performed by: PHYSICIAN ASSISTANT

## 2022-06-02 PROCEDURE — 20610 DRAIN/INJ JOINT/BURSA W/O US: CPT | Mod: LT,S$GLB,, | Performed by: PHYSICIAN ASSISTANT

## 2022-06-02 PROCEDURE — 1159F PR MEDICATION LIST DOCUMENTED IN MEDICAL RECORD: ICD-10-PCS | Mod: CPTII,S$GLB,, | Performed by: PHYSICIAN ASSISTANT

## 2022-06-02 PROCEDURE — 3288F FALL RISK ASSESSMENT DOCD: CPT | Mod: CPTII,S$GLB,, | Performed by: PHYSICIAN ASSISTANT

## 2022-06-02 PROCEDURE — 3288F PR FALLS RISK ASSESSMENT DOCUMENTED: ICD-10-PCS | Mod: CPTII,S$GLB,, | Performed by: PHYSICIAN ASSISTANT

## 2022-06-02 PROCEDURE — 20610 PR DRAIN/INJECT LARGE JOINT/BURSA: ICD-10-PCS | Mod: LT,S$GLB,, | Performed by: PHYSICIAN ASSISTANT

## 2022-06-02 PROCEDURE — 99213 PR OFFICE/OUTPT VISIT, EST, LEVL III, 20-29 MIN: ICD-10-PCS | Mod: 25,S$GLB,, | Performed by: PHYSICIAN ASSISTANT

## 2022-06-02 PROCEDURE — 1159F MED LIST DOCD IN RCRD: CPT | Mod: CPTII,S$GLB,, | Performed by: PHYSICIAN ASSISTANT

## 2022-06-02 RX ORDER — TRIAMCINOLONE ACETONIDE 40 MG/ML
40 INJECTION, SUSPENSION INTRA-ARTICULAR; INTRAMUSCULAR
Status: COMPLETED | OUTPATIENT
Start: 2022-06-02 | End: 2022-06-02

## 2022-06-02 RX ADMIN — TRIAMCINOLONE ACETONIDE 40 MG: 40 INJECTION, SUSPENSION INTRA-ARTICULAR; INTRAMUSCULAR at 10:06

## 2022-06-02 NOTE — PROGRESS NOTES
"  SUBJECTIVE:     Chief Complaint : left knee pain    History of Present Illness:  Celestine Diallo is a 76 y.o. female seen in clinic today with a chief complaint of left knee pain. Patient had R TKA by Dr. O[facility]ner 9/2019 and did very well. No right knee pain. She is very happy with R TKA. She has advanced OA of left knee. She had left knee CSI 10/2021 which relieved her pain until last month. She had Synvisc series 7/2019. She denies trauma, hip pain. She has stiffness in lateral knee when she stands but this improves with walking. She is using a cane for the left knee when she walks long distances. She is helping care for her twin 3 year old granddaughters. She does exercises daily for her legs.     Past Medical History:   Diagnosis Date    Arthritis      Review of Systems:  Constitutional: no fever or chills  ENT: no nasal congestion or sore throat  Respiratory: no cough or shortness of breath  Cardiovascular: no chest pain or palpitations  Gastrointestinal: no nausea or vomiting, tolerating diet    OBJECTIVE:     PHYSICAL EXAM:  Height 5' 2" (1.575 m), weight 63 kg (138 lb 14.4 oz).   General Appearance: WDWN, NAD  Gait: antalgic to left   Neuro/Psych: Mood & affect appropriate  Lungs: Respirations equal and unlabored.   CV: 2+ bilateral upper and lower extremity pulses.   Skin: Intact throughout LE  Extremities: No LE edema    Right Knee Exam  Range of Motion:0-125 active   Effusion:none  Condition of skin:intact and well healed incision   Location of tenderness:None     Left Knee Exam  Range of Motion:0-120 active   Effusion:none  Condition of skin:intact  Location of tenderness:Lateral joint line   Strength:4 of 5 quadriceps strength and 5 of 5 hamstring strength  Stability:stable to testing    Alignment: Moderate varus    Left Hip Examination: no pain with PROM     RADIOGRAPHS: AP, lateral and merchant left knee x-rays reviewed today by me reveal advanced degenerative changes of left knee. Loss of lateral " joint space with valgus deformity. Well fixed total knee right.    ASSESSMENT/PLAN:   Primary osteoarthritis left knee   Increasing valgus deformity  - Continue using cane   - CSI helped. Repeat today  - Continue exercises   - Not ready for TKA but encouraged her to consider in future   - F/u prn.    Knee Injection Procedure Note  Diagnosis: left knee degenerative arthritis  Indications: left knee pain  Procedure Details: Verbal consent was obtained for the procedure. The injection site was identified and the skin was prepared with alcohol. The left knee was injected from an anterolateral approach with 1 ml of Kenalog and 2 ml Lidocaine under sterile technique using a 22 gauge needle. The needle was removed and the area cleansed and dressed.  Complications:  Patient tolerated the procedure well.    she was advised to rest the knee today, using ice and elevation as needed for comfort and swelling.Immediate relief of the knee pain may be short lived and secondary to the lidocaine. she may have an increase in discomfort tonight followed by steady improvement over the next several days. It may take 1-2 weeks following the injection to get the full benefit of the medication.

## 2022-09-02 ENCOUNTER — OFFICE VISIT (OUTPATIENT)
Dept: CARDIOLOGY | Facility: CLINIC | Age: 77
End: 2022-09-02
Payer: MEDICARE

## 2022-09-02 ENCOUNTER — OFFICE VISIT (OUTPATIENT)
Dept: UROLOGY | Facility: CLINIC | Age: 77
End: 2022-09-02
Payer: MEDICARE

## 2022-09-02 VITALS — WEIGHT: 140.63 LBS | HEIGHT: 62 IN | BODY MASS INDEX: 25.88 KG/M2

## 2022-09-02 VITALS
BODY MASS INDEX: 25.92 KG/M2 | SYSTOLIC BLOOD PRESSURE: 108 MMHG | DIASTOLIC BLOOD PRESSURE: 66 MMHG | HEIGHT: 62 IN | WEIGHT: 140.88 LBS | HEART RATE: 84 BPM | OXYGEN SATURATION: 96 %

## 2022-09-02 DIAGNOSIS — R31.0 GROSS HEMATURIA: Primary | ICD-10-CM

## 2022-09-02 DIAGNOSIS — E78.2 MIXED HYPERLIPIDEMIA: ICD-10-CM

## 2022-09-02 DIAGNOSIS — R00.2 PALPITATIONS: Primary | ICD-10-CM

## 2022-09-02 PROCEDURE — 1126F AMNT PAIN NOTED NONE PRSNT: CPT | Mod: CPTII,S$GLB,, | Performed by: STUDENT IN AN ORGANIZED HEALTH CARE EDUCATION/TRAINING PROGRAM

## 2022-09-02 PROCEDURE — 1101F PR PT FALLS ASSESS DOC 0-1 FALLS W/OUT INJ PAST YR: ICD-10-PCS | Mod: CPTII,S$GLB,, | Performed by: INTERNAL MEDICINE

## 2022-09-02 PROCEDURE — 87086 URINE CULTURE/COLONY COUNT: CPT | Performed by: STUDENT IN AN ORGANIZED HEALTH CARE EDUCATION/TRAINING PROGRAM

## 2022-09-02 PROCEDURE — 3008F PR BODY MASS INDEX (BMI) DOCUMENTED: ICD-10-PCS | Mod: CPTII,S$GLB,, | Performed by: STUDENT IN AN ORGANIZED HEALTH CARE EDUCATION/TRAINING PROGRAM

## 2022-09-02 PROCEDURE — 1101F PT FALLS ASSESS-DOCD LE1/YR: CPT | Mod: CPTII,S$GLB,, | Performed by: STUDENT IN AN ORGANIZED HEALTH CARE EDUCATION/TRAINING PROGRAM

## 2022-09-02 PROCEDURE — 99204 OFFICE O/P NEW MOD 45 MIN: CPT | Mod: S$GLB,,, | Performed by: STUDENT IN AN ORGANIZED HEALTH CARE EDUCATION/TRAINING PROGRAM

## 2022-09-02 PROCEDURE — 1160F RVW MEDS BY RX/DR IN RCRD: CPT | Mod: CPTII,S$GLB,, | Performed by: STUDENT IN AN ORGANIZED HEALTH CARE EDUCATION/TRAINING PROGRAM

## 2022-09-02 PROCEDURE — 1101F PT FALLS ASSESS-DOCD LE1/YR: CPT | Mod: CPTII,S$GLB,, | Performed by: INTERNAL MEDICINE

## 2022-09-02 PROCEDURE — 3078F DIAST BP <80 MM HG: CPT | Mod: CPTII,S$GLB,, | Performed by: INTERNAL MEDICINE

## 2022-09-02 PROCEDURE — 1159F MED LIST DOCD IN RCRD: CPT | Mod: CPTII,S$GLB,, | Performed by: STUDENT IN AN ORGANIZED HEALTH CARE EDUCATION/TRAINING PROGRAM

## 2022-09-02 PROCEDURE — 99999 PR PBB SHADOW E&M-EST. PATIENT-LVL III: CPT | Mod: PBBFAC,,, | Performed by: INTERNAL MEDICINE

## 2022-09-02 PROCEDURE — 1101F PR PT FALLS ASSESS DOC 0-1 FALLS W/OUT INJ PAST YR: ICD-10-PCS | Mod: CPTII,S$GLB,, | Performed by: STUDENT IN AN ORGANIZED HEALTH CARE EDUCATION/TRAINING PROGRAM

## 2022-09-02 PROCEDURE — 1126F PR PAIN SEVERITY QUANTIFIED, NO PAIN PRESENT: ICD-10-PCS | Mod: CPTII,S$GLB,, | Performed by: STUDENT IN AN ORGANIZED HEALTH CARE EDUCATION/TRAINING PROGRAM

## 2022-09-02 PROCEDURE — 99999 PR PBB SHADOW E&M-EST. PATIENT-LVL III: ICD-10-PCS | Mod: PBBFAC,,, | Performed by: INTERNAL MEDICINE

## 2022-09-02 PROCEDURE — 1126F PR PAIN SEVERITY QUANTIFIED, NO PAIN PRESENT: ICD-10-PCS | Mod: CPTII,S$GLB,, | Performed by: INTERNAL MEDICINE

## 2022-09-02 PROCEDURE — 99999 PR PBB SHADOW E&M-EST. PATIENT-LVL III: ICD-10-PCS | Mod: PBBFAC,,, | Performed by: STUDENT IN AN ORGANIZED HEALTH CARE EDUCATION/TRAINING PROGRAM

## 2022-09-02 PROCEDURE — 3288F FALL RISK ASSESSMENT DOCD: CPT | Mod: CPTII,S$GLB,, | Performed by: STUDENT IN AN ORGANIZED HEALTH CARE EDUCATION/TRAINING PROGRAM

## 2022-09-02 PROCEDURE — 3078F PR MOST RECENT DIASTOLIC BLOOD PRESSURE < 80 MM HG: ICD-10-PCS | Mod: CPTII,S$GLB,, | Performed by: INTERNAL MEDICINE

## 2022-09-02 PROCEDURE — 3008F BODY MASS INDEX DOCD: CPT | Mod: CPTII,S$GLB,, | Performed by: INTERNAL MEDICINE

## 2022-09-02 PROCEDURE — 1159F PR MEDICATION LIST DOCUMENTED IN MEDICAL RECORD: ICD-10-PCS | Mod: CPTII,S$GLB,, | Performed by: STUDENT IN AN ORGANIZED HEALTH CARE EDUCATION/TRAINING PROGRAM

## 2022-09-02 PROCEDURE — 3074F PR MOST RECENT SYSTOLIC BLOOD PRESSURE < 130 MM HG: ICD-10-PCS | Mod: CPTII,S$GLB,, | Performed by: INTERNAL MEDICINE

## 2022-09-02 PROCEDURE — 3008F BODY MASS INDEX DOCD: CPT | Mod: CPTII,S$GLB,, | Performed by: STUDENT IN AN ORGANIZED HEALTH CARE EDUCATION/TRAINING PROGRAM

## 2022-09-02 PROCEDURE — 99203 OFFICE O/P NEW LOW 30 MIN: CPT | Mod: S$GLB,,, | Performed by: INTERNAL MEDICINE

## 2022-09-02 PROCEDURE — 3008F PR BODY MASS INDEX (BMI) DOCUMENTED: ICD-10-PCS | Mod: CPTII,S$GLB,, | Performed by: INTERNAL MEDICINE

## 2022-09-02 PROCEDURE — 99204 PR OFFICE/OUTPT VISIT, NEW, LEVL IV, 45-59 MIN: ICD-10-PCS | Mod: S$GLB,,, | Performed by: STUDENT IN AN ORGANIZED HEALTH CARE EDUCATION/TRAINING PROGRAM

## 2022-09-02 PROCEDURE — 3288F PR FALLS RISK ASSESSMENT DOCUMENTED: ICD-10-PCS | Mod: CPTII,S$GLB,, | Performed by: INTERNAL MEDICINE

## 2022-09-02 PROCEDURE — 99999 PR PBB SHADOW E&M-EST. PATIENT-LVL III: CPT | Mod: PBBFAC,,, | Performed by: STUDENT IN AN ORGANIZED HEALTH CARE EDUCATION/TRAINING PROGRAM

## 2022-09-02 PROCEDURE — 3288F PR FALLS RISK ASSESSMENT DOCUMENTED: ICD-10-PCS | Mod: CPTII,S$GLB,, | Performed by: STUDENT IN AN ORGANIZED HEALTH CARE EDUCATION/TRAINING PROGRAM

## 2022-09-02 PROCEDURE — 3074F SYST BP LT 130 MM HG: CPT | Mod: CPTII,S$GLB,, | Performed by: INTERNAL MEDICINE

## 2022-09-02 PROCEDURE — 99203 PR OFFICE/OUTPT VISIT, NEW, LEVL III, 30-44 MIN: ICD-10-PCS | Mod: S$GLB,,, | Performed by: INTERNAL MEDICINE

## 2022-09-02 PROCEDURE — 93010 ELECTROCARDIOGRAM REPORT: CPT | Mod: S$GLB,,, | Performed by: INTERNAL MEDICINE

## 2022-09-02 PROCEDURE — 93010 EKG 12-LEAD: ICD-10-PCS | Mod: S$GLB,,, | Performed by: INTERNAL MEDICINE

## 2022-09-02 PROCEDURE — 93005 ELECTROCARDIOGRAM TRACING: CPT

## 2022-09-02 PROCEDURE — 1159F MED LIST DOCD IN RCRD: CPT | Mod: CPTII,S$GLB,, | Performed by: INTERNAL MEDICINE

## 2022-09-02 PROCEDURE — 1159F PR MEDICATION LIST DOCUMENTED IN MEDICAL RECORD: ICD-10-PCS | Mod: CPTII,S$GLB,, | Performed by: INTERNAL MEDICINE

## 2022-09-02 PROCEDURE — 1126F AMNT PAIN NOTED NONE PRSNT: CPT | Mod: CPTII,S$GLB,, | Performed by: INTERNAL MEDICINE

## 2022-09-02 PROCEDURE — 1160F PR REVIEW ALL MEDS BY PRESCRIBER/CLIN PHARMACIST DOCUMENTED: ICD-10-PCS | Mod: CPTII,S$GLB,, | Performed by: STUDENT IN AN ORGANIZED HEALTH CARE EDUCATION/TRAINING PROGRAM

## 2022-09-02 PROCEDURE — 3288F FALL RISK ASSESSMENT DOCD: CPT | Mod: CPTII,S$GLB,, | Performed by: INTERNAL MEDICINE

## 2022-09-02 RX ORDER — ATORVASTATIN CALCIUM 20 MG/1
20 TABLET, FILM COATED ORAL DAILY
COMMUNITY
End: 2023-11-09

## 2022-09-02 NOTE — PROGRESS NOTES
Patient ID: Celestine Diallo is a 76 y.o. female.    Chief Complaint: Hematuria    Referral: Aaareferral Self  No address on file      HPI    76 y.o.  presents to Urology clinic for evaluation of gross hematuria x 1 occasion, it was painless. Patient is unsure when it happened but she remembers it caused her concern for follow up. She states her urine was very very very light pink, not red, no clots. Denies hx of smoking or stones, patient denies renal disease. Patient drinks 4 glasses of water daily, has 1 cup of caffeine in the AM. Denies dysuria, abdominal or flank pain. She had 3 vaginal deliveries, no hx of hysterectomy. Not sexually active. No hx of recurrent UTIs.   No hx of malignancy        Medically necessary ROS documented in HPI    Past Medical History  Active Ambulatory Problems     Diagnosis Date Noted    H/O thyroidectomy 09/19/2019    Varicose veins of leg with swelling, right 09/20/2019    Status post total right knee replacement 9/30/2019 09/27/2019    Primary osteoarthritis of right knee 09/30/2019     Resolved Ambulatory Problems     Diagnosis Date Noted    Mass of foot 12/19/2014    Chronic pain of right knee 10/24/2019    Decreased range of motion of right knee 10/24/2019    Decreased strength 10/24/2019    Impaired gait and mobility 10/24/2019     Past Medical History:   Diagnosis Date    Arthritis          Past Surgical History  Past Surgical History:   Procedure Laterality Date    CYST REMOVAL Left 2012    foot    JOINT REPLACEMENT      THYROIDECTOMY  2005    THYROIDECTOMY, PARTIAL  1980    TOTAL KNEE ARTHROPLASTY Right 9/30/2019    Procedure: ARTHROPLASTY, KNEE, TOTAL;  Surgeon: John L. Ochsner Jr., MD;  Location: [facility];  Service: Orthopedics;  Laterality: Right;       Social History  Social Connections: Not on file       Medications    Current Outpatient Medications:     FLUZONE HIGH-DOSE 2014-15, PF, 180 mcg/0.5 mL Syrg, , Disp: , Rfl:     FLUZONE HIGH-DOSE 2019-20, PF, 180 mcg/0.5 mL  Syrg, ADM 0.5ML IM UTD, Disp: , Rfl: 0    multivitamin with minerals tablet, Take 1 tablet by mouth once daily., Disp: , Rfl:     naproxen sodium (ALEVE) 220 mg Cap, Take 1 tablet by mouth. , Disp: , Rfl:     tramadol-acetaminophen 37.5-325 mg (ULTRACET) 37.5-325 mg Tab, Take 1 tablet by mouth nightly as needed., Disp: 20 tablet, Rfl: 0    Allergies  Review of patient's allergies indicates:  No Known Allergies      Objective:      Physical Exam  Constitutional:       Appearance: She is well-developed.   HENT:      Head: Normocephalic and atraumatic.   Eyes:      Conjunctiva/sclera: Conjunctivae normal.   Pulmonary:      Effort: Pulmonary effort is normal. No respiratory distress.   Abdominal:      General: Abdomen is flat. There is no distension.      Palpations: Abdomen is soft. There is no mass.      Tenderness: There is no abdominal tenderness. There is no right CVA tenderness, left CVA tenderness or guarding.   Skin:     General: Skin is warm.      Findings: No rash.   Neurological:      General: No focal deficit present.      Mental Status: She is alert and oriented to person, place, and time.   Psychiatric:         Mood and Affect: Mood normal.         Behavior: Behavior normal.         Assessment:       1. Gross hematuria          Plan:           Discussed possible etiologies of hematuria including urolithiasis, inflammation, medicorenal disease, malignancy, trauma, infection, vulvovaginal atrophy, uterine etc.   Urine culture ordered  Renal US ordered, can consider CT urogram   Cystoscopy discussed and to be scheduled  Should no obvious finding be detected recommend PCP refer patient to nephrology to rule out glomerular disorder ( IgA nephropathy, sickle cell, etc)

## 2022-09-02 NOTE — PROGRESS NOTES
Cardiology    9/2/2022  11:33 AM    Problem list  Patient Active Problem List   Diagnosis    H/O thyroidectomy    Varicose veins of leg with swelling, right    Status post total right knee replacement 9/30/2019    Primary osteoarthritis of right knee    Mixed hyperlipidemia       CC:  Follow-up    HPI:  Patient was referred by her PCP Dr Alex Correa who she saw yesterday.  He recommended that she be seen today prior to leaving to go out of the country.  She will be traveling to [facility] for 2 months.  Yesterday she had an EKG at her PCP's office which showed atrial fibrillation as interpreted by the computer.  However her EKG showed sinus rhythm with PACs.  She also had echocardiogram on August 19th at her PCP's office which showed mild MR and severe TR and mild pulmonic insufficiency.  Check carotid Doppler which showed less than 50% stenosis in both carotid arteries.  She denies any cardiac symptoms.  She denies any chest pain on exertion, dyspnea on exertion, paroxysmal nocturnal dyspnea, palpitation or syncope.  She is active.  She does not have hypertension or diabetes.  She does not smoke or drink alcohol.  Her son is with her today and states that she is very active.    Medications  Current Outpatient Medications   Medication Sig Dispense Refill    atorvastatin (LIPITOR) 20 MG tablet Take 20 mg by mouth once daily.       No current facility-administered medications for this visit.      Prior to Admission medications    Medication Sig Start Date End Date Taking? Authorizing Provider   atorvastatin (LIPITOR) 20 MG tablet Take 20 mg by mouth once daily.   Yes Historical Provider   FLUZONE HIGH-DOSE 2014-15, PF, 180 mcg/0.5 mL Syrg  10/1/14 9/2/22  Historical Provider   FLUZONE HIGH-DOSE 2019-20, PF, 180 mcg/0.5 mL Syrg ADM 0.5ML IM UTD 8/29/19 9/2/22  Historical Provider   multivitamin with minerals tablet Take 1 tablet by mouth once daily.  9/2/22  Historical Provider   naproxen sodium (ALEVE) 220 mg Cap  Take 1 tablet by mouth.   9/2/22  Historical Provider   tramadol-acetaminophen 37.5-325 mg (ULTRACET) 37.5-325 mg Tab Take 1 tablet by mouth nightly as needed. 9/25/20 9/2/22  Carolyn Cardenas PA-C         History  Past Medical History:   Diagnosis Date    Arthritis      Past Surgical History:   Procedure Laterality Date    CYST REMOVAL Left 2012    foot    JOINT REPLACEMENT      THYROIDECTOMY  2005    THYROIDECTOMY, PARTIAL  1980    TOTAL KNEE ARTHROPLASTY Right 9/30/2019    Procedure: ARTHROPLASTY, KNEE, TOTAL;  Surgeon: John L. Ochsner Jr., MD;  Location: [facility];  Service: Orthopedics;  Laterality: Right;     Social History     Socioeconomic History    Marital status: Single   Tobacco Use    Smoking status: Never    Smokeless tobacco: Never   Substance and Sexual Activity    Alcohol use: No    Drug use: Never    Sexual activity: Never         Allergies  Review of patient's allergies indicates:  No Known Allergies      Review of Systems   Review of Systems   Constitutional: Negative for decreased appetite, fever and weight loss.   HENT:  Negative for congestion and nosebleeds.    Eyes:  Negative for double vision, vision loss in left eye, vision loss in right eye and visual disturbance.   Cardiovascular:  Negative for chest pain, claudication, cyanosis, dyspnea on exertion, irregular heartbeat, leg swelling, near-syncope, orthopnea, palpitations, paroxysmal nocturnal dyspnea and syncope.   Respiratory:  Negative for cough, hemoptysis, shortness of breath, sleep disturbances due to breathing, snoring, sputum production and wheezing.    Endocrine: Negative for cold intolerance and heat intolerance.   Skin:  Negative for nail changes and rash.   Musculoskeletal:  Negative for joint pain, muscle cramps, muscle weakness and myalgias.   Gastrointestinal:  Negative for change in bowel habit, heartburn, hematemesis, hematochezia, hemorrhoids and melena.   Neurological:  Negative for dizziness, focal weakness and  headaches.       Physical Exam  Wt Readings from Last 1 Encounters:   09/02/22 63.9 kg (140 lb 14.4 oz)     BP Readings from Last 3 Encounters:   09/02/22 108/66   02/28/20 118/63   10/15/19 120/69     Pulse Readings from Last 1 Encounters:   09/02/22 84     Body mass index is 25.77 kg/m².    Physical Exam  Vitals reviewed.   Constitutional:       Appearance: She is well-developed.   HENT:      Head: Atraumatic.   Eyes:      General: No scleral icterus.  Neck:      Vascular: Normal carotid pulses. No carotid bruit, hepatojugular reflux or JVD.   Cardiovascular:      Rate and Rhythm: Normal rate and regular rhythm.      Chest Wall: PMI is not displaced.      Pulses: Intact distal pulses.           Carotid pulses are 2+ on the right side and 2+ on the left side.       Radial pulses are 2+ on the right side and 2+ on the left side.        Dorsalis pedis pulses are 2+ on the right side and 2+ on the left side.      Heart sounds: Normal heart sounds, S1 normal and S2 normal. No murmur heard.    No friction rub.   Pulmonary:      Effort: Pulmonary effort is normal. No respiratory distress.      Breath sounds: Normal breath sounds. No stridor. No wheezing or rales.   Chest:      Chest wall: No tenderness.   Abdominal:      General: Bowel sounds are normal.      Palpations: Abdomen is soft.   Musculoskeletal:      Cervical back: Neck supple. No edema.   Skin:     General: Skin is warm and dry.      Nails: There is no clubbing.   Neurological:      Mental Status: She is alert and oriented to person, place, and time.   Psychiatric:         Behavior: Behavior normal.         Thought Content: Thought content normal.           Assessment  1. Mixed hyperlipidemia  On atorvastatin    2. Palpitations  Being assessed  - EKG 12-lead  - Echo; Future  - Holter monitor - 24 hour; Future        Plan and Discussion  Discussed her EKG today which showed normal sinus rhythm with sinus arrhythmia.  Discussed her EKG done at PCP's office did  not show atrial fibrillation but rather sinus rhythm with PACs.  She has no murmur on exam that is consistent with the severe tricuspid regurgitation seen on the echocardiogram.  Reassured that she has no cardiac symptoms to be concerned about.  She is cleared to travel.  On return, we will repeat her echocardiogram and Holter monitor.    Follow Up  2 months      Martín Correa MD, F.A.C.C, F.S.C.A.I.

## 2022-09-04 LAB — BACTERIA UR CULT: NO GROWTH

## 2022-11-14 ENCOUNTER — PROCEDURE VISIT (OUTPATIENT)
Dept: UROLOGY | Facility: CLINIC | Age: 77
End: 2022-11-14
Payer: MEDICARE

## 2022-11-14 ENCOUNTER — HOSPITAL ENCOUNTER (OUTPATIENT)
Dept: RADIOLOGY | Facility: HOSPITAL | Age: 77
Discharge: HOME | End: 2022-11-14
Attending: STUDENT IN AN ORGANIZED HEALTH CARE EDUCATION/TRAINING PROGRAM
Payer: MEDICARE

## 2022-11-14 ENCOUNTER — TELEPHONE (OUTPATIENT)
Dept: UROLOGY | Facility: CLINIC | Age: 77
End: 2022-11-14

## 2022-11-14 DIAGNOSIS — R31.0 GROSS HEMATURIA: ICD-10-CM

## 2022-11-14 DIAGNOSIS — N36.2 URETHRAL CARUNCLE: Primary | ICD-10-CM

## 2022-11-14 LAB
BILIRUB SERPL-MCNC: NEGATIVE MG/DL
BLOOD URINE, POC: NORMAL
COLOR, POC UA: YELLOW
GLUCOSE UR QL STRIP: NORMAL
KETONES UR QL STRIP: NEGATIVE
LEUKOCYTE ESTERASE URINE, POC: NEGATIVE
NITRITE, POC UA: NEGATIVE
PH, POC UA: 6
PROTEIN, POC: NEGATIVE
SPECIFIC GRAVITY, POC UA: 1020
UROBILINOGEN, POC UA: NORMAL

## 2022-11-14 PROCEDURE — 81001 POCT URINALYSIS, DIPSTICK OR TABLET REAGENT, AUTOMATED, WITH MICROSCOP: ICD-10-PCS | Mod: S$GLB,,, | Performed by: STUDENT IN AN ORGANIZED HEALTH CARE EDUCATION/TRAINING PROGRAM

## 2022-11-14 PROCEDURE — 52000 CYSTOSCOPY: ICD-10-PCS | Mod: S$GLB,,, | Performed by: STUDENT IN AN ORGANIZED HEALTH CARE EDUCATION/TRAINING PROGRAM

## 2022-11-14 PROCEDURE — 76770 US EXAM ABDO BACK WALL COMP: CPT | Mod: 26,,, | Performed by: RADIOLOGY

## 2022-11-14 PROCEDURE — 52000 CYSTOURETHROSCOPY: CPT | Mod: S$GLB,,, | Performed by: STUDENT IN AN ORGANIZED HEALTH CARE EDUCATION/TRAINING PROGRAM

## 2022-11-14 PROCEDURE — 76770 US EXAM ABDO BACK WALL COMP: CPT | Mod: TC

## 2022-11-14 PROCEDURE — 81001 URINALYSIS AUTO W/SCOPE: CPT | Mod: S$GLB,,, | Performed by: STUDENT IN AN ORGANIZED HEALTH CARE EDUCATION/TRAINING PROGRAM

## 2022-11-14 PROCEDURE — 76770 US RETROPERITONEAL COMPLETE: ICD-10-PCS | Mod: 26,,, | Performed by: RADIOLOGY

## 2022-11-14 RX ORDER — ESTRADIOL 0.1 MG/G
1 CREAM VAGINAL
Qty: 42.5 G | Refills: 11 | Status: SHIPPED | OUTPATIENT
Start: 2022-11-14 | End: 2023-04-26 | Stop reason: SDUPTHER

## 2022-11-14 NOTE — TELEPHONE ENCOUNTER
I alerted the pt son and he verbalized understanding.  ----- Message from Shruti Michelle MD sent at 11/14/2022  3:57 PM CST -----  Please alert patient of normal US results, thanks

## 2022-11-14 NOTE — PROCEDURES
Cystoscopy    Date/Time: 11/14/2022 10:00 AM  Performed by: Shruti Michelle MD  Authorized by: Shruti Michelle MD     Consent Done?:  Yes (Written)  Timeout: prior to procedure the correct patient, procedure, and site was verified    Prep: patient was prepped and draped in usual sterile fashion    Local anesthesia used?: Yes    Anesthesia:  Intraurethral instillation  Local anesthetic:  Topical anesthetic  Indications: hematuria    Position:  Dorsal lithotomy  Anesthesia:  Intraurethral instillation  Patient sedated?: No    Preparation: Patient was prepped and draped in usual sterile fashion    External exam performed: 6 o clock protrusion of urethral mucosa, urethral caruncle.    Digital exam performed: Yes    Urethra normal: Yes    Bladder neck normal: Yes    Bladder normal: Yes     patient tolerated the procedure well with no immediate complications  Comments:      No masses, tumors, diverticulum of bladder  Awaiting imaging results    RTC 6-7 weeks to re-evaluate urethral caruncle, common in post menopausal women, likely the source of patient's hematuria

## 2022-11-17 ENCOUNTER — HOSPITAL ENCOUNTER (OUTPATIENT)
Dept: CARDIOLOGY | Facility: OTHER | Age: 77
Discharge: HOME | End: 2022-11-17
Attending: INTERNAL MEDICINE
Payer: MEDICARE

## 2022-11-17 VITALS
DIASTOLIC BLOOD PRESSURE: 66 MMHG | WEIGHT: 140 LBS | BODY MASS INDEX: 25.76 KG/M2 | SYSTOLIC BLOOD PRESSURE: 108 MMHG | HEIGHT: 62 IN

## 2022-11-17 DIAGNOSIS — R00.2 PALPITATIONS: ICD-10-CM

## 2022-11-17 LAB
ASCENDING AORTA: 2.99 CM
AV INDEX (PROSTH): 0.63
AV MEAN GRADIENT: 4 MMHG
AV PEAK GRADIENT: 7 MMHG
AV VALVE AREA: 2.1 CM2
AV VELOCITY RATIO: 0.66
BSA FOR ECHO PROCEDURE: 1.67 M2
CV ECHO LV RWT: 0.45 CM
DOP CALC AO PEAK VEL: 1.29 M/S
DOP CALC AO VTI: 30.9 CM
DOP CALC LVOT AREA: 3.3 CM2
DOP CALC LVOT DIAMETER: 2.06 CM
DOP CALC LVOT PEAK VEL: 0.85 M/S
DOP CALC LVOT STROKE VOLUME: 64.96 CM3
DOP CALCLVOT PEAK VEL VTI: 19.5 CM
E WAVE DECELERATION TIME: 272.96 MSEC
E/A RATIO: 0.65
E/E' RATIO: 12.73 M/S
ECHO LV POSTERIOR WALL: 0.97 CM (ref 0.6–1.1)
EJECTION FRACTION: 60 %
FRACTIONAL SHORTENING: 43 % (ref 28–44)
INTERVENTRICULAR SEPTUM: 1.04 CM (ref 0.6–1.1)
IVC DIAMETER: 0.65 CM
IVRT: 138.41 MSEC
LA MAJOR: 5.19 CM
LA MINOR: 5.39 CM
LA WIDTH: 4.2 CM
LEFT ATRIUM SIZE: 3.46 CM
LEFT ATRIUM VOLUME INDEX MOD: 43.3 ML/M2
LEFT ATRIUM VOLUME INDEX: 39.8 ML/M2
LEFT ATRIUM VOLUME MOD: 71 CM3
LEFT ATRIUM VOLUME: 65.32 CM3
LEFT INTERNAL DIMENSION IN SYSTOLE: 2.45 CM (ref 2.1–4)
LEFT VENTRICLE DIASTOLIC VOLUME INDEX: 51.37 ML/M2
LEFT VENTRICLE DIASTOLIC VOLUME: 84.24 ML
LEFT VENTRICLE MASS INDEX: 88 G/M2
LEFT VENTRICLE SYSTOLIC VOLUME INDEX: 12.9 ML/M2
LEFT VENTRICLE SYSTOLIC VOLUME: 21.23 ML
LEFT VENTRICULAR INTERNAL DIMENSION IN DIASTOLE: 4.33 CM (ref 3.5–6)
LEFT VENTRICULAR MASS: 145.08 G
LV LATERAL E/E' RATIO: 11.67 M/S
LV SEPTAL E/E' RATIO: 14 M/S
LVOT MG: 1.48 MMHG
LVOT MV: 0.57 CM/S
MV PEAK A VEL: 1.07 M/S
MV PEAK E VEL: 0.7 M/S
MV STENOSIS PRESSURE HALF TIME: 79.16 MS
MV VALVE AREA P 1/2 METHOD: 2.78 CM2
PISA MRMAX VEL: 2.06 M/S
PISA TR MAX VEL: 2.46 M/S
PV PEAK VELOCITY: 0.86 CM/S
RA MAJOR: 4.79 CM
RA WIDTH: 4.4 CM
RIGHT VENTRICULAR END-DIASTOLIC DIMENSION: 3.65 CM
SINUS: 2.8 CM
STJ: 2.62 CM
TDI LATERAL: 0.06 M/S
TDI SEPTAL: 0.05 M/S
TDI: 0.06 M/S
TR MAX PG: 24 MMHG
TRICUSPID ANNULAR PLANE SYSTOLIC EXCURSION: 2.1 CM

## 2022-11-17 PROCEDURE — 93306 TTE W/DOPPLER COMPLETE: CPT | Mod: 26,,, | Performed by: INTERNAL MEDICINE

## 2022-11-17 PROCEDURE — 93306 TTE W/DOPPLER COMPLETE: CPT

## 2022-11-17 PROCEDURE — 93226 XTRNL ECG REC<48 HR SCAN A/R: CPT

## 2022-11-17 PROCEDURE — 93227 HOLTER MONITOR - 24 HOUR (CUPID ONLY): ICD-10-PCS | Mod: ,,, | Performed by: INTERNAL MEDICINE

## 2022-11-17 PROCEDURE — 93227 XTRNL ECG REC<48 HR R&I: CPT | Mod: ,,, | Performed by: INTERNAL MEDICINE

## 2022-11-17 PROCEDURE — 93306 ECHO (CUPID ONLY): ICD-10-PCS | Mod: 26,,, | Performed by: INTERNAL MEDICINE

## 2022-11-23 LAB
OHS CV EVENT MONITOR DAY: 0
OHS CV HOLTER LENGTH DECIMAL HOURS: 23.98
OHS CV HOLTER LENGTH HOURS: 23
OHS CV HOLTER LENGTH MINUTES: 59
OHS CV HOLTER SINUS AVERAGE HR: 73
OHS CV HOLTER SINUS MAX HR: 105
OHS CV HOLTER SINUS MIN HR: 50

## 2022-12-02 ENCOUNTER — OFFICE VISIT (OUTPATIENT)
Dept: CARDIOLOGY | Facility: CLINIC | Age: 77
End: 2022-12-02
Payer: MEDICARE

## 2022-12-02 VITALS
DIASTOLIC BLOOD PRESSURE: 72 MMHG | SYSTOLIC BLOOD PRESSURE: 118 MMHG | OXYGEN SATURATION: 97 % | HEIGHT: 62 IN | WEIGHT: 144.19 LBS | BODY MASS INDEX: 26.53 KG/M2 | HEART RATE: 79 BPM

## 2022-12-02 DIAGNOSIS — I83.891 VARICOSE VEINS OF LEG WITH SWELLING, RIGHT: ICD-10-CM

## 2022-12-02 DIAGNOSIS — E78.2 MIXED HYPERLIPIDEMIA: Primary | ICD-10-CM

## 2022-12-02 PROCEDURE — 3008F PR BODY MASS INDEX (BMI) DOCUMENTED: ICD-10-PCS | Mod: CPTII,S$GLB,, | Performed by: INTERNAL MEDICINE

## 2022-12-02 PROCEDURE — 3074F PR MOST RECENT SYSTOLIC BLOOD PRESSURE < 130 MM HG: ICD-10-PCS | Mod: CPTII,S$GLB,, | Performed by: INTERNAL MEDICINE

## 2022-12-02 PROCEDURE — 99999 PR PBB SHADOW E&M-EST. PATIENT-LVL III: ICD-10-PCS | Mod: PBBFAC,,, | Performed by: INTERNAL MEDICINE

## 2022-12-02 PROCEDURE — 3008F BODY MASS INDEX DOCD: CPT | Mod: CPTII,S$GLB,, | Performed by: INTERNAL MEDICINE

## 2022-12-02 PROCEDURE — 1159F MED LIST DOCD IN RCRD: CPT | Mod: CPTII,S$GLB,, | Performed by: INTERNAL MEDICINE

## 2022-12-02 PROCEDURE — 1126F PR PAIN SEVERITY QUANTIFIED, NO PAIN PRESENT: ICD-10-PCS | Mod: CPTII,S$GLB,, | Performed by: INTERNAL MEDICINE

## 2022-12-02 PROCEDURE — 3074F SYST BP LT 130 MM HG: CPT | Mod: CPTII,S$GLB,, | Performed by: INTERNAL MEDICINE

## 2022-12-02 PROCEDURE — 1126F AMNT PAIN NOTED NONE PRSNT: CPT | Mod: CPTII,S$GLB,, | Performed by: INTERNAL MEDICINE

## 2022-12-02 PROCEDURE — 99214 OFFICE O/P EST MOD 30 MIN: CPT | Mod: S$GLB,,, | Performed by: INTERNAL MEDICINE

## 2022-12-02 PROCEDURE — 3078F DIAST BP <80 MM HG: CPT | Mod: CPTII,S$GLB,, | Performed by: INTERNAL MEDICINE

## 2022-12-02 PROCEDURE — 99999 PR PBB SHADOW E&M-EST. PATIENT-LVL III: CPT | Mod: PBBFAC,,, | Performed by: INTERNAL MEDICINE

## 2022-12-02 PROCEDURE — 99214 PR OFFICE/OUTPT VISIT, EST, LEVL IV, 30-39 MIN: ICD-10-PCS | Mod: S$GLB,,, | Performed by: INTERNAL MEDICINE

## 2022-12-02 PROCEDURE — 3078F PR MOST RECENT DIASTOLIC BLOOD PRESSURE < 80 MM HG: ICD-10-PCS | Mod: CPTII,S$GLB,, | Performed by: INTERNAL MEDICINE

## 2022-12-02 PROCEDURE — 1159F PR MEDICATION LIST DOCUMENTED IN MEDICAL RECORD: ICD-10-PCS | Mod: CPTII,S$GLB,, | Performed by: INTERNAL MEDICINE

## 2022-12-02 NOTE — PROGRESS NOTES
Cardiology    12/2/2022  9:26 AM    Problem list  Patient Active Problem List   Diagnosis    H/O thyroidectomy    Varicose veins of leg with swelling, right    Status post total right knee replacement 9/30/2019    Primary osteoarthritis of right knee    Mixed hyperlipidemia       CC:  Follow-up    HPI:  She is doing well.  She went to her trip to [place] without any problems.  She came back and got her Holter done an echo done.  We discussed results of her echocardiogram and Holter monitor which were normal.  She denies any cardiac symptoms.    Medications  Current Outpatient Medications   Medication Sig Dispense Refill    atorvastatin (LIPITOR) 20 MG tablet Take 20 mg by mouth once daily.      estradioL (ESTRACE) 0.01 % (0.1 mg/gram) vaginal cream Place 1 g vaginally twice a week. 42.5 g 11     No current facility-administered medications for this visit.      Prior to Admission medications    Medication Sig Start Date End Date Taking? Authorizing Provider   atorvastatin (LIPITOR) 20 MG tablet Take 20 mg by mouth once daily.   Yes Historical Provider   estradioL (ESTRACE) 0.01 % (0.1 mg/gram) vaginal cream Place 1 g vaginally twice a week. 11/14/22 11/14/23 Yes Shruti Michelle MD         History  Past Medical History:   Diagnosis Date    Arthritis      Past Surgical History:   Procedure Laterality Date    CYST REMOVAL Left 2012    foot    JOINT REPLACEMENT      THYROIDECTOMY  2005    THYROIDECTOMY, PARTIAL  1980    TOTAL KNEE ARTHROPLASTY Right 9/30/2019    Procedure: ARTHROPLASTY, KNEE, TOTAL;  Surgeon: John L. Ochsner Jr., MD;  Location: [facility];  Service: Orthopedics;  Laterality: Right;     Social History     Socioeconomic History    Marital status: Single   Tobacco Use    Smoking status: Never    Smokeless tobacco: Never   Substance and Sexual Activity    Alcohol use: No    Drug use: Never    Sexual activity: Never         Allergies  Review of patient's allergies indicates:  No Known  Allergies      Review of Systems   Review of Systems   Constitutional: Negative for decreased appetite, fever and weight loss.   HENT:  Negative for congestion and nosebleeds.    Eyes:  Negative for double vision, vision loss in left eye, vision loss in right eye and visual disturbance.   Cardiovascular:  Negative for chest pain, claudication, cyanosis, dyspnea on exertion, irregular heartbeat, leg swelling, near-syncope, orthopnea, palpitations, paroxysmal nocturnal dyspnea and syncope.   Respiratory:  Negative for cough, hemoptysis, shortness of breath, sleep disturbances due to breathing, snoring, sputum production and wheezing.    Endocrine: Negative for cold intolerance and heat intolerance.   Skin:  Negative for nail changes and rash.   Musculoskeletal:  Negative for joint pain, muscle cramps, muscle weakness and myalgias.   Gastrointestinal:  Negative for change in bowel habit, heartburn, hematemesis, hematochezia, hemorrhoids and melena.   Neurological:  Negative for dizziness, focal weakness and headaches.       Physical Exam  Wt Readings from Last 1 Encounters:   12/02/22 65.4 kg (144 lb 3.2 oz)     BP Readings from Last 3 Encounters:   12/02/22 118/72   11/17/22 108/66   09/02/22 108/66     Pulse Readings from Last 1 Encounters:   12/02/22 79     Body mass index is 26.37 kg/m².    Physical Exam  Vitals reviewed.   Constitutional:       Appearance: She is well-developed.   HENT:      Head: Atraumatic.   Eyes:      General: No scleral icterus.  Neck:      Vascular: Normal carotid pulses. No carotid bruit, hepatojugular reflux or JVD.   Cardiovascular:      Rate and Rhythm: Normal rate and regular rhythm.      Chest Wall: PMI is not displaced.      Pulses: Intact distal pulses.           Carotid pulses are 2+ on the right side and 2+ on the left side.       Radial pulses are 2+ on the right side and 2+ on the left side.        Dorsalis pedis pulses are 2+ on the right side and 2+ on the left side.      Heart  sounds: Normal heart sounds, S1 normal and S2 normal. No murmur heard.    No friction rub.   Pulmonary:      Effort: Pulmonary effort is normal. No respiratory distress.      Breath sounds: Normal breath sounds. No stridor. No wheezing or rales.   Chest:      Chest wall: No tenderness.   Abdominal:      General: Bowel sounds are normal.      Palpations: Abdomen is soft.   Musculoskeletal:      Cervical back: Neck supple. No edema.   Skin:     General: Skin is warm and dry.      Nails: There is no clubbing.   Neurological:      Mental Status: She is alert and oriented to person, place, and time.   Psychiatric:         Behavior: Behavior normal.         Thought Content: Thought content normal.           Assessment  1. Mixed hyperlipidemia  Stable    2. Varicose veins of leg with swelling, right  Stable        Plan and Discussion  Her echo showed normal EF and no valvular lesions.  Discussed her Holter monitor which showed normal sinus rhythm.    Follow Up  BRANDON Correa MD, F.A.C.C, F.S.C.A.I.        30 minutes were spent in chart review, documentation and review of results, and evaluation, treatment, and counseling of patient on the same day of service.

## 2022-12-23 ENCOUNTER — OFFICE VISIT (OUTPATIENT)
Dept: UROLOGY | Facility: CLINIC | Age: 77
End: 2022-12-23
Payer: MEDICARE

## 2022-12-23 VITALS — WEIGHT: 145.06 LBS | BODY MASS INDEX: 26.53 KG/M2

## 2022-12-23 DIAGNOSIS — R31.0 GROSS HEMATURIA: ICD-10-CM

## 2022-12-23 DIAGNOSIS — N36.2 URETHRAL CARUNCLE: Primary | ICD-10-CM

## 2022-12-23 PROCEDURE — 99214 PR OFFICE/OUTPT VISIT, EST, LEVL IV, 30-39 MIN: ICD-10-PCS | Mod: S$GLB,,, | Performed by: UROLOGY

## 2022-12-23 PROCEDURE — 3288F FALL RISK ASSESSMENT DOCD: CPT | Mod: CPTII,S$GLB,, | Performed by: UROLOGY

## 2022-12-23 PROCEDURE — 1126F PR PAIN SEVERITY QUANTIFIED, NO PAIN PRESENT: ICD-10-PCS | Mod: CPTII,S$GLB,, | Performed by: UROLOGY

## 2022-12-23 PROCEDURE — 3288F PR FALLS RISK ASSESSMENT DOCUMENTED: ICD-10-PCS | Mod: CPTII,S$GLB,, | Performed by: UROLOGY

## 2022-12-23 PROCEDURE — 99214 OFFICE O/P EST MOD 30 MIN: CPT | Mod: S$GLB,,, | Performed by: UROLOGY

## 2022-12-23 PROCEDURE — 1159F PR MEDICATION LIST DOCUMENTED IN MEDICAL RECORD: ICD-10-PCS | Mod: CPTII,S$GLB,, | Performed by: UROLOGY

## 2022-12-23 PROCEDURE — 1101F PR PT FALLS ASSESS DOC 0-1 FALLS W/OUT INJ PAST YR: ICD-10-PCS | Mod: CPTII,S$GLB,, | Performed by: UROLOGY

## 2022-12-23 PROCEDURE — 99999 PR PBB SHADOW E&M-EST. PATIENT-LVL III: CPT | Mod: PBBFAC,,, | Performed by: UROLOGY

## 2022-12-23 PROCEDURE — 1126F AMNT PAIN NOTED NONE PRSNT: CPT | Mod: CPTII,S$GLB,, | Performed by: UROLOGY

## 2022-12-23 PROCEDURE — 1101F PT FALLS ASSESS-DOCD LE1/YR: CPT | Mod: CPTII,S$GLB,, | Performed by: UROLOGY

## 2022-12-23 PROCEDURE — 1159F MED LIST DOCD IN RCRD: CPT | Mod: CPTII,S$GLB,, | Performed by: UROLOGY

## 2022-12-23 PROCEDURE — 99999 PR PBB SHADOW E&M-EST. PATIENT-LVL III: ICD-10-PCS | Mod: PBBFAC,,, | Performed by: UROLOGY

## 2022-12-23 RX ORDER — ERGOCALCIFEROL 1.25 MG/1
50000 CAPSULE ORAL
COMMUNITY
Start: 2022-08-19

## 2022-12-23 RX ORDER — ROSUVASTATIN CALCIUM 10 MG/1
10 TABLET, COATED ORAL
COMMUNITY
Start: 2022-08-22

## 2022-12-23 NOTE — PROGRESS NOTES
[facility] Urology   Clinic Note    SUBJECTIVE:     Chief Complaint   Patient presents with    Follow-up       Referral from: No ref. provider found.    History of Present Illness:  Celestine Diallo is a 77 y.o. female who presents to clinic for follow up for urethral caruncle and gross hematuria.    Patient had a history of gross hematuria.  Was found to have a urethral caruncle on her cystoscopy.  She was started on estrogen therapy.  She is been doing it regularly and reports that her hematuria has resolved.  She does report some tea-colored urine but denies any gross hematuria.  No blood clots.    Patient endorses no additional complaints at this time.    Past Medical History:   Diagnosis Date    Arthritis        Past Surgical History:   Procedure Laterality Date    CYST REMOVAL Left 2012    foot    JOINT REPLACEMENT      THYROIDECTOMY  2005    THYROIDECTOMY, PARTIAL  1980    TOTAL KNEE ARTHROPLASTY Right 9/30/2019    Procedure: ARTHROPLASTY, KNEE, TOTAL;  Surgeon: John L. Ochsner Jr., MD;  Location: [facility];  Service: Orthopedics;  Laterality: Right;       No family history on file.    Social History     Tobacco Use    Smoking status: Never    Smokeless tobacco: Never   Substance Use Topics    Alcohol use: No    Drug use: Never       Current Outpatient Medications on File Prior to Visit   Medication Sig Dispense Refill    atorvastatin (LIPITOR) 20 MG tablet Take 20 mg by mouth once daily.      ergocalciferol (ERGOCALCIFEROL) 50,000 unit Cap Take 50,000 Units by mouth every 7 days.      estradioL (ESTRACE) 0.01 % (0.1 mg/gram) vaginal cream Place 1 g vaginally twice a week. 42.5 g 11    rosuvastatin (CRESTOR) 10 MG tablet Take 10 mg by mouth.       No current facility-administered medications on file prior to visit.       Review of patient's allergies indicates:  No Known Allergies    Review of Systems:  A review of 10+ systems was conducted with pertinent positive and negative findings documented in HPI with all  "other systems reviewed and negative.    OBJECTIVE:     Estimated body mass index is 26.53 kg/m² as calculated from the following:    Height as of 12/2/22: 5' 2" (1.575 m).    Weight as of this encounter: 65.8 kg (145 lb 1 oz).    Vital Signs (Most Recent)  There were no vitals filed for this visit.    Physical Exam:  GENERAL: patient sitting comfortably  HEENT: normocephalic  NECK: supple, no JVD  PULM: normal chest rise, no increased WOB  HEART: non-diaphoretic  ABDO: soft, nondistended, nontender  BACK: no CVA tenderness bilaterally  SKIN: warm, dry, well perfused  EXT: no bruising or edema  NEURO: grossly normal with no focal deficits  PSYCH: appropriate mood and affect    Genitourinary Exam:  Pelvic exam performed in urethra and mucosa appears normal today    Lab Results   Component Value Date    BUN 14 09/20/2019    CREATININE 0.7 09/20/2019    WBC 6.99 09/20/2019    HGB 14.4 09/20/2019    HCT 45.4 09/20/2019     09/20/2019    INR 0.9 09/20/2019        Imaging:  I have personally reviewed all relevant imaging studies.    No results found for this or any previous visit (from the past 2160 hour(s)).  No results found for this or any previous visit (from the past 2160 hour(s)).  Holter monitor - 24 hour  Sinus rhythm.  Very rare PVC.  Occasional PACs.    Monitoring started at 8:54 AM and continued for 23 hr 59 min. The average   heart rate was 73 BPM. The minimum heart  rate was 50 BPM, occurring at 10:45:04 PM. The maximum heart rate was 105   BPM, occurring at 12:28:01 PM.  Ventricular ectopic activity consisted of 11 beats, of which, 7 were in   single PVCs, 4 were in interpolated PVCs.  The patient's rhythm included 3 hr 40 min 45 sec of bradycardia. The   slowest single episode of bradycardia occurred at  10:44:54 PM, lasting 36 sec, with minimum heart rate of 50 BPM.  The patient's rhythm included 6 min 53 sec of tachycardia. The fastest   single episode of tachycardia occurred at  12:27:39 PM, " lasting 49 sec, with maximum heart rate of 105 BPM.  Supraventricular ectopic activity consisted of 484 beats, of which, 4 were   in 1 run, 10 were in atrial couplets, 35 were  late beats, 282 were single PACs, 153 were in bigeminy. The longest R-R   interval was 1.4 seconds occurring at 9:09:31 PM. The  longest N-N interval was 1.4 seconds occurring at 9:09:31 PM. The longest   supraventricular run occurred at 4:08:13 PM  consisting of 4 beats, with maximum heart rate of 141 BPM. The fastest   supraventricular run occurred at 4:08:13 PM, consisting  of 4 beats, with maximum heart rate of 141 BPM.       PSA:  No results found for: PSA, PSADIAG, PSATOTAL, PSAFREE    Testosterone:  No results found for: TOTALTESTOST, TOTALTESTOST, TESTOSTERONE     ASSESSMENT     1. Urethral caruncle    2. Gross hematuria        PLAN:     Urethral Caruncle     - urethral mucosa appeared normal today.  Patient can follow up with Dr. House. Can continue estrogen topical therapy    2.   Gross Hematuria    - Workup otherwise negative.  If it recurs we can consider a CT urogram    Follow up 4-6 months    Maurice Downs MD  Urology  [facility]    Disclaimer: This note has been generated using voice-recognition software. There may be typographical errors that have been missed during proof-reading.

## 2023-01-08 ENCOUNTER — HOSPITAL ENCOUNTER (EMERGENCY)
Facility: HOSPITAL | Age: 78
Discharge: HOME | End: 2023-01-09
Attending: EMERGENCY MEDICINE
Payer: MEDICARE

## 2023-01-08 DIAGNOSIS — R25.1 SHAKINESS: ICD-10-CM

## 2023-01-08 DIAGNOSIS — R03.0 ELEVATED BLOOD PRESSURE READING: Primary | ICD-10-CM

## 2023-01-08 PROCEDURE — 93010 EKG 12-LEAD: ICD-10-PCS | Mod: ,,, | Performed by: INTERNAL MEDICINE

## 2023-01-08 PROCEDURE — 99284 PR EMERGENCY DEPT VISIT,LEVEL IV: ICD-10-PCS | Mod: ,,, | Performed by: EMERGENCY MEDICINE

## 2023-01-08 PROCEDURE — 96360 HYDRATION IV INFUSION INIT: CPT

## 2023-01-08 PROCEDURE — 93010 ELECTROCARDIOGRAM REPORT: CPT | Mod: ,,, | Performed by: INTERNAL MEDICINE

## 2023-01-08 PROCEDURE — 99284 EMERGENCY DEPT VISIT MOD MDM: CPT | Mod: ,,, | Performed by: EMERGENCY MEDICINE

## 2023-01-08 PROCEDURE — 99284 EMERGENCY DEPT VISIT MOD MDM: CPT | Mod: 25

## 2023-01-08 PROCEDURE — 93005 ELECTROCARDIOGRAM TRACING: CPT

## 2023-01-09 VITALS
RESPIRATION RATE: 16 BRPM | TEMPERATURE: 98 F | DIASTOLIC BLOOD PRESSURE: 72 MMHG | SYSTOLIC BLOOD PRESSURE: 131 MMHG | OXYGEN SATURATION: 99 % | BODY MASS INDEX: 27.44 KG/M2 | HEART RATE: 67 BPM | WEIGHT: 150 LBS

## 2023-01-09 LAB
ALBUMIN SERPL BCP-MCNC: 3.9 G/DL (ref 3.5–5.2)
ALP SERPL-CCNC: 106 U/L (ref 55–135)
ALT SERPL W/O P-5'-P-CCNC: 15 U/L (ref 10–44)
ANION GAP SERPL CALC-SCNC: 7 MMOL/L (ref 8–16)
AST SERPL-CCNC: 20 U/L (ref 10–40)
BASOPHILS # BLD AUTO: 0.05 K/UL (ref 0–0.2)
BASOPHILS NFR BLD: 0.7 % (ref 0–1.9)
BILIRUB SERPL-MCNC: 0.3 MG/DL (ref 0.1–1)
BILIRUB UR QL STRIP: NEGATIVE
BUN SERPL-MCNC: 21 MG/DL (ref 8–23)
CALCIUM SERPL-MCNC: 9.7 MG/DL (ref 8.7–10.5)
CHLORIDE SERPL-SCNC: 107 MMOL/L (ref 95–110)
CLARITY UR REFRACT.AUTO: CLEAR
CO2 SERPL-SCNC: 25 MMOL/L (ref 23–29)
COLOR UR AUTO: COLORLESS
CREAT SERPL-MCNC: 0.8 MG/DL (ref 0.5–1.4)
DIFFERENTIAL METHOD: ABNORMAL
EOSINOPHIL # BLD AUTO: 0.5 K/UL (ref 0–0.5)
EOSINOPHIL NFR BLD: 5.9 % (ref 0–8)
ERYTHROCYTE [DISTWIDTH] IN BLOOD BY AUTOMATED COUNT: 13.2 % (ref 11.5–14.5)
EST. GFR  (NO RACE VARIABLE): >60 ML/MIN/1.73 M^2
GLUCOSE SERPL-MCNC: 102 MG/DL (ref 70–110)
GLUCOSE SERPL-MCNC: 95 MG/DL (ref 70–110)
GLUCOSE UR QL STRIP: NEGATIVE
HCT VFR BLD AUTO: 41 % (ref 37–48.5)
HCV AB SERPL QL IA: NORMAL
HGB BLD-MCNC: 12.9 G/DL (ref 12–16)
HGB UR QL STRIP: ABNORMAL
HIV 1+2 AB+HIV1 P24 AG SERPL QL IA: NORMAL
IMM GRANULOCYTES # BLD AUTO: 0.02 K/UL (ref 0–0.04)
IMM GRANULOCYTES NFR BLD AUTO: 0.3 % (ref 0–0.5)
KETONES UR QL STRIP: NEGATIVE
LEUKOCYTE ESTERASE UR QL STRIP: NEGATIVE
LYMPHOCYTES # BLD AUTO: 2.4 K/UL (ref 1–4.8)
LYMPHOCYTES NFR BLD: 32 % (ref 18–48)
MCH RBC QN AUTO: 28.6 PG (ref 27–31)
MCHC RBC AUTO-ENTMCNC: 31.5 G/DL (ref 32–36)
MCV RBC AUTO: 91 FL (ref 82–98)
MICROSCOPIC COMMENT: NORMAL
MONOCYTES # BLD AUTO: 0.6 K/UL (ref 0.3–1)
MONOCYTES NFR BLD: 7.2 % (ref 4–15)
NEUTROPHILS # BLD AUTO: 4.1 K/UL (ref 1.8–7.7)
NEUTROPHILS NFR BLD: 53.9 % (ref 38–73)
NITRITE UR QL STRIP: NEGATIVE
NRBC BLD-RTO: 0 /100 WBC
PH UR STRIP: 7 [PH] (ref 5–8)
PLATELET # BLD AUTO: 255 K/UL (ref 150–450)
PMV BLD AUTO: 9.6 FL (ref 9.2–12.9)
POTASSIUM SERPL-SCNC: 3.9 MMOL/L (ref 3.5–5.1)
PROT SERPL-MCNC: 7.4 G/DL (ref 6–8.4)
PROT UR QL STRIP: NEGATIVE
RBC # BLD AUTO: 4.51 M/UL (ref 4–5.4)
RBC #/AREA URNS AUTO: 2 /HPF (ref 0–4)
SODIUM SERPL-SCNC: 139 MMOL/L (ref 136–145)
SP GR UR STRIP: 1.01 (ref 1–1.03)
SQUAMOUS #/AREA URNS AUTO: 0 /HPF
URN SPEC COLLECT METH UR: ABNORMAL
WBC # BLD AUTO: 7.59 K/UL (ref 3.9–12.7)
WBC #/AREA URNS AUTO: 1 /HPF (ref 0–5)

## 2023-01-09 PROCEDURE — 81001 URINALYSIS AUTO W/SCOPE: CPT | Performed by: EMERGENCY MEDICINE

## 2023-01-09 PROCEDURE — 85025 COMPLETE CBC W/AUTO DIFF WBC: CPT | Performed by: EMERGENCY MEDICINE

## 2023-01-09 PROCEDURE — 80053 COMPREHEN METABOLIC PANEL: CPT | Performed by: EMERGENCY MEDICINE

## 2023-01-09 PROCEDURE — 86803 HEPATITIS C AB TEST: CPT | Performed by: PHYSICIAN ASSISTANT

## 2023-01-09 PROCEDURE — 87389 HIV-1 AG W/HIV-1&-2 AB AG IA: CPT | Performed by: PHYSICIAN ASSISTANT

## 2023-01-09 PROCEDURE — 63600175 PHARM REV CODE 636 W HCPCS: Performed by: EMERGENCY MEDICINE

## 2023-01-09 RX ADMIN — SODIUM CHLORIDE, POTASSIUM CHLORIDE, SODIUM LACTATE AND CALCIUM CHLORIDE 1000 ML: 600; 310; 30; 20 INJECTION, SOLUTION INTRAVENOUS at 12:01

## 2023-01-09 NOTE — ED TRIAGE NOTES
"Celestine Diallo, a 77 y.o. female presents to the ED w/ complaint of hypertension at home. Patient reports she's been taking her blood pressure 3x a day for monitoring but she doesn't have hypertension. This evening she felt "shaky" and took her BP finding the systolic value to be >170s. She came here. Denies fever, chest pain, shortness of breath, nausea, vomiting, abdominal pain.    Triage note:  Chief Complaint   Patient presents with    Hypertension     Pt c/o HPB in the 170's. She also feels "shaky". Pt has not had a problem in the past. Denies SOB, HA, and CP.      Review of patient's allergies indicates:  No Known Allergies  Past Medical History:   Diagnosis Date    Arthritis       "

## 2023-01-09 NOTE — ED PROVIDER NOTES
"Encounter Date: 1/8/2023       History     Chief Complaint   Patient presents with    Hypertension     Pt c/o HPB in the 170's. She also feels "shaky". Pt has not had a problem in the past. Denies SOB, HA, and CP.      The patient is a 77-year-old with the below past medical history who presents with her son.  A couple of hours ago she developed generalized shakiness.  She checked her blood pressure and it was 170s systolic.  She checked it again a short time later and it was still elevated.  She does not have hypertension.  She had a similar transient elevation of her blood pressure a few months ago.  She has been checking her blood pressure 3 times daily since then and it is normally in the 130s systolic.  She denies fever, chills, nausea, and vomiting.  She denies fatigue.  She denies ocular pain and changes in her vision.  She denies chest pain and palpitations.  She denies weakness and numbness to her extremities.  She denies headache, lightheadedness, and dizziness.  She had a sodium rich diet today which consisted of both steak and canned sardines.    The history is provided by the patient and a relative. No  was used.   Review of patient's allergies indicates:  No Known Allergies  Past Medical History:   Diagnosis Date    Arthritis      Past Surgical History:   Procedure Laterality Date    CYST REMOVAL Left 2012    foot    JOINT REPLACEMENT      THYROIDECTOMY  2005    THYROIDECTOMY, PARTIAL  1980    TOTAL KNEE ARTHROPLASTY Right 9/30/2019    Procedure: ARTHROPLASTY, KNEE, TOTAL;  Surgeon: John L. Ochsner Jr., MD;  Location: [facility];  Service: Orthopedics;  Laterality: Right;     No family history on file.  Social History     Tobacco Use    Smoking status: Never    Smokeless tobacco: Never   Substance Use Topics    Alcohol use: No    Drug use: Never     Review of Systems   Constitutional:  Negative for chills, diaphoresis, fatigue and fever.   Eyes:  Negative for visual disturbance. "   Respiratory:  Negative for shortness of breath.    Cardiovascular:  Negative for chest pain and palpitations.   Gastrointestinal:  Negative for abdominal pain, nausea and vomiting.   Endocrine: Negative for polydipsia and polyuria.   Genitourinary:  Negative for difficulty urinating and dysuria.   Musculoskeletal:  Negative for arthralgias and myalgias.   Neurological:  Negative for dizziness, syncope, weakness, light-headedness, numbness and headaches.     Physical Exam     Initial Vitals [01/08/23 2249]   BP Pulse Resp Temp SpO2   (!) 179/92 79 18 98.4 °F (36.9 °C) 100 %      MAP       --         Physical Exam    Nursing note and vitals reviewed.  Constitutional: She is not diaphoretic. No distress.   HENT:   Head: Normocephalic and atraumatic.   Mouth/Throat: Oropharynx is clear and moist.   Eyes: Conjunctivae and EOM are normal. Pupils are equal, round, and reactive to light. No scleral icterus. Right eye exhibits no nystagmus. Left eye exhibits no nystagmus.   Cardiovascular:  Normal rate, regular rhythm and normal heart sounds.     Exam reveals no gallop and no friction rub.       No murmur heard.  Pulmonary/Chest: No respiratory distress. She has no wheezes. She has no rhonchi. She has no rales.   Abdominal: Abdomen is soft. She exhibits no distension. There is no abdominal tenderness.     Neurological: She is alert and oriented to person, place, and time. She has normal strength. No cranial nerve deficit. Coordination normal. GCS score is 15. GCS eye subscore is 4. GCS verbal subscore is 5. GCS motor subscore is 6.   Skin: Skin is warm and dry. No pallor.   Psychiatric: She has a normal mood and affect. Her speech is normal and behavior is normal. She is not actively hallucinating. She is attentive.       ED Course   Procedures  Labs Reviewed   CBC W/ AUTO DIFFERENTIAL - Abnormal; Notable for the following components:       Result Value    MCHC 31.5 (*)     All other components within normal limits    COMPREHENSIVE METABOLIC PANEL - Abnormal; Notable for the following components:    Anion Gap 7 (*)     All other components within normal limits   HIV 1 / 2 ANTIBODY    Narrative:     Release to patient->Immediate   HEPATITIS C ANTIBODY    Narrative:     Release to patient->Immediate   URINALYSIS, REFLEX TO URINE CULTURE     EKG Readings: (Independently Interpreted)   01/08/2023 22:51   Normal sinus rhythm. Ventricular rate 72 bpm.  Normal axis.  Normal QRS and QT intervals.  No ST segment elevation or depression.  Normal T-wave morphology. Overall impression:  Normal EKG.     Imaging Results    None          Medications   lactated ringers bolus 1,000 mL (1,000 mLs Intravenous New Bag 1/9/23 0047)     Medical Decision Making:   History:   Old Medical Records: I decided to obtain old medical records.  Old Records Summarized: other records.  Independently Interpreted Test(s):   I have ordered and independently interpreted EKG Reading(s) - see prior notes  Clinical Tests:   Lab Tests: Ordered and Reviewed  Medical Tests: Ordered and Reviewed  ED Management:    MDM:  This was an urgent evaluation.  The patient had elevated blood pressure on arrival.  Her physical examination was unremarkable.  EKG was normal.  CBC and CMP were without concerning findings.  Urinalysis was pending at the end of my shift.  I turned her care over to Dr. Aldana.  Likely discharge.  She will need oral antibiotics if there are signs of UTI.  I instructed her to arrange close follow-up with her PCP.  Standard ED return instructions were given.           ED Course as of 01/09/23 0105   Sun Jan 08, 2023   7470 EKG received/reviewed. No STEMI. [LP]      ED Course User Index  [LP] Tani Young III, MD                 Clinical Impression:   Final diagnoses:  [R03.0] Elevated blood pressure reading (Primary)  [R25.1] Shakiness               Tani Young III, MD  01/09/23 0105

## 2023-01-09 NOTE — ED NOTES
Pt has urine specimen collection cup and once again reminded of importance of urine specimen collection. Pt reports she is unable to go at this time and will attempt once fluids are complete.

## 2023-01-09 NOTE — ED NOTES
General: Awake and alert:  Neck: Supple  Respiratory: Nonlabored respirations. No audible wheeze. Speaking in full sentences.  Cardiac: Well-perfused. No acrocyanosis. 2+ radial pulses bilaterally. Bilateral hands warm.  Abdomen: Supple. Nontender  Neurological: Moves all extremities symmetrically and equally. Answers questions appropriately.

## 2023-01-09 NOTE — PROVIDER PROGRESS NOTES - EMERGENCY DEPT.
Encounter Date: 1/8/2023    ED Physician Progress Notes        Physician Note:   S/o Dr. Young:  77-year-old female presenting with brief episode of hypertension to the 170s, resolved to 130s after 1 L fluid.  Labs pending include: Urinalysis. Anticipate d/c once UA results.     3:07 AM  UA negative for infection.  Stable for discharge.

## 2023-01-18 ENCOUNTER — OFFICE VISIT (OUTPATIENT)
Dept: ORTHOPEDICS | Facility: CLINIC | Age: 78
End: 2023-01-18
Payer: MEDICARE

## 2023-01-18 VITALS
BODY MASS INDEX: 25.32 KG/M2 | SYSTOLIC BLOOD PRESSURE: 121 MMHG | HEART RATE: 72 BPM | DIASTOLIC BLOOD PRESSURE: 73 MMHG | HEIGHT: 62 IN | WEIGHT: 137.56 LBS

## 2023-01-18 DIAGNOSIS — Z96.651 STATUS POST RIGHT KNEE REPLACEMENT: ICD-10-CM

## 2023-01-18 DIAGNOSIS — M17.12 PRIMARY OSTEOARTHRITIS OF LEFT KNEE: Primary | ICD-10-CM

## 2023-01-18 PROCEDURE — 3078F DIAST BP <80 MM HG: CPT | Mod: CPTII,S$GLB,, | Performed by: PHYSICIAN ASSISTANT

## 2023-01-18 PROCEDURE — 1125F AMNT PAIN NOTED PAIN PRSNT: CPT | Mod: CPTII,S$GLB,, | Performed by: PHYSICIAN ASSISTANT

## 2023-01-18 PROCEDURE — 99999 PR PBB SHADOW E&M-EST. PATIENT-LVL III: CPT | Mod: PBBFAC,,, | Performed by: PHYSICIAN ASSISTANT

## 2023-01-18 PROCEDURE — 99213 PR OFFICE/OUTPT VISIT, EST, LEVL III, 20-29 MIN: ICD-10-PCS | Mod: 25,S$GLB,, | Performed by: PHYSICIAN ASSISTANT

## 2023-01-18 PROCEDURE — 1160F PR REVIEW ALL MEDS BY PRESCRIBER/CLIN PHARMACIST DOCUMENTED: ICD-10-PCS | Mod: CPTII,S$GLB,, | Performed by: PHYSICIAN ASSISTANT

## 2023-01-18 PROCEDURE — 20610 DRAIN/INJ JOINT/BURSA W/O US: CPT | Mod: LT,S$GLB,, | Performed by: PHYSICIAN ASSISTANT

## 2023-01-18 PROCEDURE — 1101F PR PT FALLS ASSESS DOC 0-1 FALLS W/OUT INJ PAST YR: ICD-10-PCS | Mod: CPTII,S$GLB,, | Performed by: PHYSICIAN ASSISTANT

## 2023-01-18 PROCEDURE — 1125F PR PAIN SEVERITY QUANTIFIED, PAIN PRESENT: ICD-10-PCS | Mod: CPTII,S$GLB,, | Performed by: PHYSICIAN ASSISTANT

## 2023-01-18 PROCEDURE — 1159F PR MEDICATION LIST DOCUMENTED IN MEDICAL RECORD: ICD-10-PCS | Mod: CPTII,S$GLB,, | Performed by: PHYSICIAN ASSISTANT

## 2023-01-18 PROCEDURE — 1159F MED LIST DOCD IN RCRD: CPT | Mod: CPTII,S$GLB,, | Performed by: PHYSICIAN ASSISTANT

## 2023-01-18 PROCEDURE — 3074F PR MOST RECENT SYSTOLIC BLOOD PRESSURE < 130 MM HG: ICD-10-PCS | Mod: CPTII,S$GLB,, | Performed by: PHYSICIAN ASSISTANT

## 2023-01-18 PROCEDURE — 3288F PR FALLS RISK ASSESSMENT DOCUMENTED: ICD-10-PCS | Mod: CPTII,S$GLB,, | Performed by: PHYSICIAN ASSISTANT

## 2023-01-18 PROCEDURE — 99213 OFFICE O/P EST LOW 20 MIN: CPT | Mod: 25,S$GLB,, | Performed by: PHYSICIAN ASSISTANT

## 2023-01-18 PROCEDURE — 3078F PR MOST RECENT DIASTOLIC BLOOD PRESSURE < 80 MM HG: ICD-10-PCS | Mod: CPTII,S$GLB,, | Performed by: PHYSICIAN ASSISTANT

## 2023-01-18 PROCEDURE — 20610 PR DRAIN/INJECT LARGE JOINT/BURSA: ICD-10-PCS | Mod: LT,S$GLB,, | Performed by: PHYSICIAN ASSISTANT

## 2023-01-18 PROCEDURE — 99999 PR PBB SHADOW E&M-EST. PATIENT-LVL III: ICD-10-PCS | Mod: PBBFAC,,, | Performed by: PHYSICIAN ASSISTANT

## 2023-01-18 PROCEDURE — 3288F FALL RISK ASSESSMENT DOCD: CPT | Mod: CPTII,S$GLB,, | Performed by: PHYSICIAN ASSISTANT

## 2023-01-18 PROCEDURE — 3074F SYST BP LT 130 MM HG: CPT | Mod: CPTII,S$GLB,, | Performed by: PHYSICIAN ASSISTANT

## 2023-01-18 PROCEDURE — 1160F RVW MEDS BY RX/DR IN RCRD: CPT | Mod: CPTII,S$GLB,, | Performed by: PHYSICIAN ASSISTANT

## 2023-01-18 PROCEDURE — 1101F PT FALLS ASSESS-DOCD LE1/YR: CPT | Mod: CPTII,S$GLB,, | Performed by: PHYSICIAN ASSISTANT

## 2023-01-18 RX ORDER — TRIAMCINOLONE ACETONIDE 40 MG/ML
40 INJECTION, SUSPENSION INTRA-ARTICULAR; INTRAMUSCULAR
Status: COMPLETED | OUTPATIENT
Start: 2023-01-18 | End: 2023-01-18

## 2023-01-18 RX ADMIN — TRIAMCINOLONE ACETONIDE 40 MG: 40 INJECTION, SUSPENSION INTRA-ARTICULAR; INTRAMUSCULAR at 01:01

## 2023-01-18 NOTE — PROGRESS NOTES
SUBJECTIVE:     Chief Complaint & History of Present Illness:  Celestine Diallo is a Established patient 77 y.o. female who is seen here today with a complaint of    Chief Complaint   Patient presents with    Left Knee - Pain    .  She is patient well-known to us was last seen treated the clinic for this condition by Carolyn Lim PA-C at which time she would undergone cortisone injection of the left knee.  She had very good results for about 6 months beginning to have return of pain soreness in the knees requesting repeat injection therapy.  She is status post right TKA performed by Dr. [facility] in 2019 which she is doing very well.  She does not wish to pursue knee replacement surgery for the left at this time  On a scale of 1-10, with 10 being worst pain imaginable, he rates this pain as 3 on good days and 6 on bad days.  she describes the pain as sore and achy.    Review of patient's allergies indicates:  No Known Allergies      Current Outpatient Medications   Medication Sig Dispense Refill    atorvastatin (LIPITOR) 20 MG tablet Take 20 mg by mouth once daily.      ergocalciferol (ERGOCALCIFEROL) 50,000 unit Cap Take 50,000 Units by mouth every 7 days.      estradioL (ESTRACE) 0.01 % (0.1 mg/gram) vaginal cream Place 1 g vaginally twice a week. 42.5 g 11    rosuvastatin (CRESTOR) 10 MG tablet Take 10 mg by mouth.       No current facility-administered medications for this visit.       Past Medical History:   Diagnosis Date    Arthritis        Past Surgical History:   Procedure Laterality Date    CYST REMOVAL Left 2012    foot    JOINT REPLACEMENT      THYROIDECTOMY  2005    THYROIDECTOMY, PARTIAL  1980    TOTAL KNEE ARTHROPLASTY Right 9/30/2019    Procedure: ARTHROPLASTY, KNEE, TOTAL;  Surgeon: John L. Ochsner Jr., MD;  Location: [facility];  Service: Orthopedics;  Laterality: Right;       Vital Signs (Most Recent)  Vitals:    01/18/23 1335   BP: 121/73   Pulse: 72           Review of  "Systems:  ROS:  Constitutional: no fever or chills  Eyes: no visual changes  ENT: no nasal congestion or sore throat  Respiratory: no cough or shortness of breath  Cardiovascular: no chest pain or palpitations, positive varicose veins  Gastrointestinal: no nausea or vomiting, tolerating diet  Genitourinary: no hematuria or dysuria  Integument/Breast: no rash or pruritis  Hematologic/Lymphatic: no easy bruising or lymphadenopathy  Musculoskeletal: no arthralgias or myalgias, status post right TKA osteoarthritis of the left  Neurological: no seizures or tremors  Behavioral/Psych: no auditory or visual hallucinations  Endocrine: no heat or cold intolerance, status post thyroidectomy                OBJECTIVE:     PHYSICAL EXAM:  Height: 5' 2" (157.5 cm) Weight: 62.4 kg (137 lb 9.1 oz), General Appearance: Well nourished, well developed, in no acute distress.  Neurological: Mood & affect are normal.    left  Knee Exam:  Knee Range of Motion:0-120 degrees flexion   Effusion:none  Condition of skin:intact  Location of tenderness:Medial joint line   Strength:limited by pain and 5 of 5  Stability:  Lachman: stable, LCL: stable, MCL: stable, PCL: stable, and posteromedial (dial): stable  Varus /Valgus stress:  normal  Brandin:   negative/negative    right  Knee Exam:  Knee Range of Motion:0-120 degrees flexion   Effusion:none  Condition of skin:intact  Location of tenderness:None   Strength:5 of 5  Stability:  stable to testing  Varus /Valgus stress:  normal        Hip Examination:  full painless range of motion, without tenderness    RADIOGRAPHS:  X-rays from previous visit reviewed by me today demonstrate well-fixed well-aligned prosthesis in the right knee with no evidence of loosening wear damage left knee demonstrates moderate to severe arthritic changes tricompartmentally with marked osteophytic spurring sclerotic changes no evidence of fracture dislocation    ASSESSMENT/PLAN:       ICD-10-CM ICD-9-CM   1. Primary " osteoarthritis of left knee  M17.12 715.16   2. Status post right knee replacement  Z96.651 V43.65       Plan: We discussed with the patient at length all the different treatment options available for  the knee including anti-inflammatories, acetaminophen, rest, ice, knee strengthening exercise, occasional cortisone injections for temporary relief, Viscosupplimentation injections, arthroscopic debridement osteotomy, and finally knee arthroplasty.   Will proceed with repeat cortisone injection of the left knee     The injection site was identified and the skin was prepared with a betadine solution. The   left  knee was injected with 1 ml of Kenalog and 5 ml Lidocaine under sterile technique. Celestine Diallo tolerated the procedure well, she was advised to rest the knee today, ice and elevation. she did receive immediate relief of the pain in and about his knee she was told this would be short lived and is secondary to the lidocaine. she may have an increase in his discomfort tonight followed by steady improvement over the next several days. I may take 1-3 weeks following the injection to get the full benefit of the medication.  I will see her back in 4-6 months. Sooner if he has any problems or concerns.

## 2023-04-26 ENCOUNTER — OFFICE VISIT (OUTPATIENT)
Dept: UROLOGY | Facility: CLINIC | Age: 78
End: 2023-04-26
Payer: MEDICARE

## 2023-04-26 VITALS — WEIGHT: 141.13 LBS | BODY MASS INDEX: 25.81 KG/M2

## 2023-04-26 DIAGNOSIS — R31.0 GROSS HEMATURIA: ICD-10-CM

## 2023-04-26 DIAGNOSIS — N36.2 URETHRAL CARUNCLE: Primary | ICD-10-CM

## 2023-04-26 DIAGNOSIS — N95.8 GENITOURINARY SYNDROME OF MENOPAUSE: ICD-10-CM

## 2023-04-26 PROCEDURE — 1126F AMNT PAIN NOTED NONE PRSNT: CPT | Mod: CPTII,S$GLB,, | Performed by: STUDENT IN AN ORGANIZED HEALTH CARE EDUCATION/TRAINING PROGRAM

## 2023-04-26 PROCEDURE — 99999 PR PBB SHADOW E&M-EST. PATIENT-LVL III: CPT | Mod: PBBFAC,,, | Performed by: STUDENT IN AN ORGANIZED HEALTH CARE EDUCATION/TRAINING PROGRAM

## 2023-04-26 PROCEDURE — 1159F PR MEDICATION LIST DOCUMENTED IN MEDICAL RECORD: ICD-10-PCS | Mod: CPTII,S$GLB,, | Performed by: STUDENT IN AN ORGANIZED HEALTH CARE EDUCATION/TRAINING PROGRAM

## 2023-04-26 PROCEDURE — 99214 PR OFFICE/OUTPT VISIT, EST, LEVL IV, 30-39 MIN: ICD-10-PCS | Mod: S$GLB,,, | Performed by: STUDENT IN AN ORGANIZED HEALTH CARE EDUCATION/TRAINING PROGRAM

## 2023-04-26 PROCEDURE — 1160F PR REVIEW ALL MEDS BY PRESCRIBER/CLIN PHARMACIST DOCUMENTED: ICD-10-PCS | Mod: CPTII,S$GLB,, | Performed by: STUDENT IN AN ORGANIZED HEALTH CARE EDUCATION/TRAINING PROGRAM

## 2023-04-26 PROCEDURE — 1126F PR PAIN SEVERITY QUANTIFIED, NO PAIN PRESENT: ICD-10-PCS | Mod: CPTII,S$GLB,, | Performed by: STUDENT IN AN ORGANIZED HEALTH CARE EDUCATION/TRAINING PROGRAM

## 2023-04-26 PROCEDURE — 99214 OFFICE O/P EST MOD 30 MIN: CPT | Mod: S$GLB,,, | Performed by: STUDENT IN AN ORGANIZED HEALTH CARE EDUCATION/TRAINING PROGRAM

## 2023-04-26 PROCEDURE — 1159F MED LIST DOCD IN RCRD: CPT | Mod: CPTII,S$GLB,, | Performed by: STUDENT IN AN ORGANIZED HEALTH CARE EDUCATION/TRAINING PROGRAM

## 2023-04-26 PROCEDURE — 99999 PR PBB SHADOW E&M-EST. PATIENT-LVL III: ICD-10-PCS | Mod: PBBFAC,,, | Performed by: STUDENT IN AN ORGANIZED HEALTH CARE EDUCATION/TRAINING PROGRAM

## 2023-04-26 PROCEDURE — 1160F RVW MEDS BY RX/DR IN RCRD: CPT | Mod: CPTII,S$GLB,, | Performed by: STUDENT IN AN ORGANIZED HEALTH CARE EDUCATION/TRAINING PROGRAM

## 2023-04-26 RX ORDER — ESTRADIOL 0.1 MG/G
1 CREAM VAGINAL
Qty: 42.5 G | Refills: 11 | Status: SHIPPED | OUTPATIENT
Start: 2023-04-27 | End: 2024-04-26

## 2023-04-26 NOTE — LETTER
April 26, 2023        Son SHARJoyce MD Madeline  [address]  [place] [address]             [facility] - Urology  [address]  [place] [address]  Phone: [phone]  Fax: [phone]   Patient: Celestine Diallo   MR Number: [id number]   YOB: 1945   Date of Visit: 4/26/2023       Dear Dr. Correa:    Thank you for referring Celestine Diallo to me for evaluation. Below are the relevant portions of my assessment and plan of care.    Urethral caruncle/  syndrome of menopause  Resolution of urethral caruncle  Continue topical estrogen indefinitely    Hematuria  2/2 to above, resolved        If you have questions, please do not hesitate to call me. I look forward to following Celestine along with you.    Sincerely,      Shruti Michelle MD           CC    No Recipients

## 2023-04-26 NOTE — PROGRESS NOTES
Patient ID: Celestine Diallo is a 77 y.o. female.    Chief Complaint: Other    Referral: No referring provider defined for this encounter.     HPI  77 y.o. who presents to the Urology clinic for evaluation of gross hematuria, work up revealed urethral caruncle, patient has been applying topical estrogen cream as directed. Patient denies gross hematuria, dysuria, UTIs.        Medically Necessary ROS documented in HPI    Past Medical History  Active Ambulatory Problems     Diagnosis Date Noted    H/O thyroidectomy 09/19/2019    Varicose veins of leg with swelling, right 09/20/2019    Status post total right knee replacement 9/30/2019 09/27/2019    Primary osteoarthritis of right knee 09/30/2019    Mixed hyperlipidemia 09/02/2022     Resolved Ambulatory Problems     Diagnosis Date Noted    Mass of foot 12/19/2014    Chronic pain of right knee 10/24/2019    Decreased range of motion of right knee 10/24/2019    Decreased strength 10/24/2019    Impaired gait and mobility 10/24/2019     Past Medical History:   Diagnosis Date    Arthritis          Past Surgical History  Past Surgical History:   Procedure Laterality Date    CYST REMOVAL Left 2012    foot    JOINT REPLACEMENT      THYROIDECTOMY  2005    THYROIDECTOMY, PARTIAL  1980    TOTAL KNEE ARTHROPLASTY Right 9/30/2019    Procedure: ARTHROPLASTY, KNEE, TOTAL;  Surgeon: John L. Ochsner Jr., MD;  Location: [facility];  Service: Orthopedics;  Laterality: Right;       Social History  Social Connections: Not on file       Medications    Current Outpatient Medications:     atorvastatin (LIPITOR) 20 MG tablet, Take 20 mg by mouth once daily., Disp: , Rfl:     ergocalciferol (ERGOCALCIFEROL) 50,000 unit Cap, Take 50,000 Units by mouth every 7 days., Disp: , Rfl:     estradioL (ESTRACE) 0.01 % (0.1 mg/gram) vaginal cream, Place 1 g vaginally twice a week., Disp: 42.5 g, Rfl: 11    rosuvastatin (CRESTOR) 10 MG tablet, Take 10 mg by mouth., Disp: , Rfl:     Allergies  Review of  patient's allergies indicates:  No Known Allergies    Patient's PMH, FH, Social hx, Medications, allergies reviewed and updated as pertinent to today's visit    Objective:      Physical Exam  Constitutional:       Appearance: She is well-developed.   HENT:      Head: Normocephalic and atraumatic.   Eyes:      Conjunctiva/sclera: Conjunctivae normal.   Pulmonary:      Effort: Pulmonary effort is normal. No respiratory distress.   Abdominal:      General: There is no distension.      Palpations: Abdomen is soft. There is no mass.      Tenderness: There is no abdominal tenderness. There is no guarding.   Genitourinary:     General: Normal vulva.      Comments: Resolution of urethral caruncle  Skin:     General: Skin is warm.      Findings: No rash.   Neurological:      Mental Status: She is alert and oriented to person, place, and time.   Psychiatric:         Behavior: Behavior normal.         Imaging results:     EXAMINATION:  US RETROPERITONEAL COMPLETE     CLINICAL HISTORY:  check PVR, hematuria;  Gross hematuria     FINDINGS:  The right kidney measures 10.6 cm, the left 10.3.  The right resistive index is 0.78, the left 0.72.  The postvoid bladder volume is 13 cc.  Patient voided before the exam so no prevoid volume available.  Kidneys demonstrate no mass, scar, stone, or hydronephrosis.     Impression:     Normal renal ultrasound.        Electronically signed by: Jose Groves MD  Date:                                            11/14/2022  Time:                                           13:15    Assessment:       1. Urethral caruncle    2. Gross hematuria    3. Genitourinary syndrome of menopause        Plan:       Urethral caruncle/  syndrome of menopause  Resolution of urethral caruncle  Continue topical estrogen indefinitely, refill provided    Hematuria  2/2 to above, resolved

## 2023-05-17 ENCOUNTER — TELEPHONE (OUTPATIENT)
Dept: ORTHOPEDICS | Facility: CLINIC | Age: 78
End: 2023-05-17
Payer: MEDICARE

## 2023-05-23 ENCOUNTER — OFFICE VISIT (OUTPATIENT)
Dept: ORTHOPEDICS | Facility: CLINIC | Age: 78
End: 2023-05-23
Payer: MEDICARE

## 2023-05-23 VITALS — WEIGHT: 145.06 LBS | HEIGHT: 62 IN | BODY MASS INDEX: 26.69 KG/M2

## 2023-05-23 DIAGNOSIS — M17.12 PRIMARY OSTEOARTHRITIS OF LEFT KNEE: Primary | ICD-10-CM

## 2023-05-23 PROCEDURE — 1101F PT FALLS ASSESS-DOCD LE1/YR: CPT | Mod: CPTII,S$GLB,, | Performed by: PHYSICIAN ASSISTANT

## 2023-05-23 PROCEDURE — 99212 PR OFFICE/OUTPT VISIT, EST, LEVL II, 10-19 MIN: ICD-10-PCS | Mod: 25,S$GLB,, | Performed by: PHYSICIAN ASSISTANT

## 2023-05-23 PROCEDURE — 1160F RVW MEDS BY RX/DR IN RCRD: CPT | Mod: CPTII,S$GLB,, | Performed by: PHYSICIAN ASSISTANT

## 2023-05-23 PROCEDURE — 99999 PR PBB SHADOW E&M-EST. PATIENT-LVL III: ICD-10-PCS | Mod: PBBFAC,,, | Performed by: PHYSICIAN ASSISTANT

## 2023-05-23 PROCEDURE — 1125F PR PAIN SEVERITY QUANTIFIED, PAIN PRESENT: ICD-10-PCS | Mod: CPTII,S$GLB,, | Performed by: PHYSICIAN ASSISTANT

## 2023-05-23 PROCEDURE — 99212 OFFICE O/P EST SF 10 MIN: CPT | Mod: 25,S$GLB,, | Performed by: PHYSICIAN ASSISTANT

## 2023-05-23 PROCEDURE — 1159F PR MEDICATION LIST DOCUMENTED IN MEDICAL RECORD: ICD-10-PCS | Mod: CPTII,S$GLB,, | Performed by: PHYSICIAN ASSISTANT

## 2023-05-23 PROCEDURE — 99999 PR PBB SHADOW E&M-EST. PATIENT-LVL III: CPT | Mod: PBBFAC,,, | Performed by: PHYSICIAN ASSISTANT

## 2023-05-23 PROCEDURE — 3288F PR FALLS RISK ASSESSMENT DOCUMENTED: ICD-10-PCS | Mod: CPTII,S$GLB,, | Performed by: PHYSICIAN ASSISTANT

## 2023-05-23 PROCEDURE — 3288F FALL RISK ASSESSMENT DOCD: CPT | Mod: CPTII,S$GLB,, | Performed by: PHYSICIAN ASSISTANT

## 2023-05-23 PROCEDURE — 20610 LARGE JOINT ASPIRATION/INJECTION: L KNEE: ICD-10-PCS | Mod: LT,S$GLB,, | Performed by: PHYSICIAN ASSISTANT

## 2023-05-23 PROCEDURE — 1160F PR REVIEW ALL MEDS BY PRESCRIBER/CLIN PHARMACIST DOCUMENTED: ICD-10-PCS | Mod: CPTII,S$GLB,, | Performed by: PHYSICIAN ASSISTANT

## 2023-05-23 PROCEDURE — 1101F PR PT FALLS ASSESS DOC 0-1 FALLS W/OUT INJ PAST YR: ICD-10-PCS | Mod: CPTII,S$GLB,, | Performed by: PHYSICIAN ASSISTANT

## 2023-05-23 PROCEDURE — 1159F MED LIST DOCD IN RCRD: CPT | Mod: CPTII,S$GLB,, | Performed by: PHYSICIAN ASSISTANT

## 2023-05-23 PROCEDURE — 1125F AMNT PAIN NOTED PAIN PRSNT: CPT | Mod: CPTII,S$GLB,, | Performed by: PHYSICIAN ASSISTANT

## 2023-05-23 PROCEDURE — 20610 DRAIN/INJ JOINT/BURSA W/O US: CPT | Mod: LT,S$GLB,, | Performed by: PHYSICIAN ASSISTANT

## 2023-05-23 RX ORDER — TRIAMCINOLONE ACETONIDE 40 MG/ML
40 INJECTION, SUSPENSION INTRA-ARTICULAR; INTRAMUSCULAR
Status: DISCONTINUED | OUTPATIENT
Start: 2023-05-23 | End: 2023-05-23 | Stop reason: HOSPADM

## 2023-05-23 RX ADMIN — TRIAMCINOLONE ACETONIDE 40 MG: 40 INJECTION, SUSPENSION INTRA-ARTICULAR; INTRAMUSCULAR at 03:05

## 2023-05-23 NOTE — PROGRESS NOTES
"Patient ID: Celestine Diallo is a 77 y.o. female.    Chief Complaint: Injections and Pain of the Left Knee      HISTORY:  Celestine Diallo is a 77 y.o. female who returns to our clinic for follow up of left knee pain.   She has previously been treated by other providers in our clinic.  She receives good relief with CSI in her left knee.  She is not ready to consider TKA in her left knee yet.  Her pain has recently returned.  She has tried rest, ice, topical creams.  She would like another injection today.      PMH/PSH/FamHx/SocHx:    Unchanged from prior visit.    ROS:  Constitution: Negative for chills, fever and weakness.   Respiratory: Negative for cough and shortness of breath.   Musculoskeletal: Positive for left knee pain  Psychiatric/Behavioral: The patient is not nervous/anxious.       PHYSICAL EXAM:   Ht 5' 2.01" (1.575 m)   Wt 65.8 kg (145 lb 1 oz)   BMI 26.53 kg/m²   Left knee  Skin intact  No warmth or effusion  ROM 0-120  TTP lateral joint  5/5 quad, 5/5 hamstring  Painless passive ROM      IMAGING: Previous X-rays of the left knee, personally reviewed by me, demonstrate severe DJD, lateral joint space narrowing, osteophyte formation.      ASSESSMENT/PLAN:    Celestine was seen today for injections and pain.    Diagnoses and all orders for this visit:    Primary osteoarthritis of left knee  -     Large Joint Aspiration/Injection: L knee      - Left knee CSI performed today  - Recommend rest, ice as needed  - Follow up 3-4 months as needed        "

## 2023-05-23 NOTE — PROCEDURES
Large Joint Aspiration/Injection: L knee    Date/Time: 5/23/2023 3:00 PM  Performed by: Mirlande Matute PA-C  Authorized by: Mirlande Matute PA-C     Consent Done?:  Yes (Verbal)  Indications:  Pain  Prep: patient was prepped and draped in usual sterile fashion    Local anesthetic:  Topical anesthetic    Details:  Needle Size:  22 G  Approach:  Anterolateral  Location:  Knee  Site:  L knee  Medications:  40 mg triamcinolone acetonide 40 mg/mL  Patient tolerance:  Patient tolerated the procedure well with no immediate complications

## 2023-11-02 ENCOUNTER — OFFICE VISIT (OUTPATIENT)
Dept: ORTHOPEDICS | Facility: CLINIC | Age: 78
End: 2023-11-02
Payer: MEDICARE

## 2023-11-02 DIAGNOSIS — M17.12 PRIMARY OSTEOARTHRITIS OF LEFT KNEE: Primary | ICD-10-CM

## 2023-11-02 PROCEDURE — 20610 LARGE JOINT ASPIRATION/INJECTION: L KNEE: ICD-10-PCS | Mod: LT,S$GLB,, | Performed by: PHYSICIAN ASSISTANT

## 2023-11-02 PROCEDURE — 99999 PR PBB SHADOW E&M-EST. PATIENT-LVL II: ICD-10-PCS | Mod: PBBFAC,,, | Performed by: PHYSICIAN ASSISTANT

## 2023-11-02 PROCEDURE — 99499 NO LOS: ICD-10-PCS | Mod: S$GLB,,, | Performed by: PHYSICIAN ASSISTANT

## 2023-11-02 PROCEDURE — 20610 DRAIN/INJ JOINT/BURSA W/O US: CPT | Mod: LT,S$GLB,, | Performed by: PHYSICIAN ASSISTANT

## 2023-11-02 PROCEDURE — 99999 PR PBB SHADOW E&M-EST. PATIENT-LVL II: CPT | Mod: PBBFAC,,, | Performed by: PHYSICIAN ASSISTANT

## 2023-11-02 PROCEDURE — 99499 UNLISTED E&M SERVICE: CPT | Mod: S$GLB,,, | Performed by: PHYSICIAN ASSISTANT

## 2023-11-02 RX ORDER — TRIAMCINOLONE ACETONIDE 40 MG/ML
40 INJECTION, SUSPENSION INTRA-ARTICULAR; INTRAMUSCULAR
Status: DISCONTINUED | OUTPATIENT
Start: 2023-11-02 | End: 2023-11-02 | Stop reason: HOSPADM

## 2023-11-02 RX ORDER — LIDOCAINE HYDROCHLORIDE 10 MG/ML
2 INJECTION INFILTRATION; PERINEURAL
Status: DISCONTINUED | OUTPATIENT
Start: 2023-11-02 | End: 2023-11-02 | Stop reason: HOSPADM

## 2023-11-02 RX ADMIN — TRIAMCINOLONE ACETONIDE 40 MG: 40 INJECTION, SUSPENSION INTRA-ARTICULAR; INTRAMUSCULAR at 07:11

## 2023-11-02 RX ADMIN — LIDOCAINE HYDROCHLORIDE 2 ML: 10 INJECTION INFILTRATION; PERINEURAL at 07:11

## 2023-11-02 NOTE — PROCEDURES
Large Joint Aspiration/Injection: L knee    Date/Time: 11/2/2023 7:00 PM    Performed by: Mirlande Matute PA-C  Authorized by: Mirlande Matute PA-C    Consent Done?:  Yes (Verbal)  Indications:  Pain  Timeout: prior to procedure the correct patient, procedure, and site was verified    Prep: patient was prepped and draped in usual sterile fashion    Local anesthetic:  Topical anesthetic    Details:  Needle Size:  22 G  Approach:  Anterolateral  Location:  Knee  Site:  L knee  Medications:  40 mg triamcinolone acetonide 40 mg/mL; 2 mL LIDOcaine HCL 10 mg/ml (1%) 10 mg/mL (1 %)  Patient tolerance:  Patient tolerated the procedure well with no immediate complications

## 2023-11-02 NOTE — PROGRESS NOTES
Patient ID: Celestine Diallo is a 77 y.o. female.    Chief Complaint: Pain and Injections of the Left Knee      HISTORY:  Celestine Diallo is a 77 y.o. female who returns to our clinic for follow up of left knee pain.     She still has good relief with CSI- would like to repeat this today  She is not ready for surgery yet      PMH/PSH/FamHx/SocHx:    Unchanged from prior visit.    ROS:  Constitution: Negative for chills, fever and weakness.   Respiratory: Negative for cough and shortness of breath.   Musculoskeletal: Positive for left knee pain  Psychiatric/Behavioral: The patient is not nervous/anxious.       PHYSICAL EXAM:   Left knee  Skin intact  No warmth or effusion  ROM 0-120  No TTP    IMAGING: Previous X-rays of the left knee, personally reviewed by me, demonstrate severe DJD, lateral joint space narrowing, osteophyte formation.      ASSESSMENT/PLAN:    Celestine was seen today for pain and injections.    Diagnoses and all orders for this visit:    Primary osteoarthritis of left knee  -     Large Joint Aspiration/Injection: L knee      - Left knee CSI performed today  - Recommend rest, ice as needed  - Follow up 3-4 months as needed

## 2023-11-09 ENCOUNTER — OFFICE VISIT (OUTPATIENT)
Dept: CARDIOLOGY | Facility: CLINIC | Age: 78
End: 2023-11-09
Payer: MEDICARE

## 2023-11-09 VITALS
HEART RATE: 66 BPM | DIASTOLIC BLOOD PRESSURE: 64 MMHG | SYSTOLIC BLOOD PRESSURE: 122 MMHG | BODY MASS INDEX: 25.62 KG/M2 | OXYGEN SATURATION: 98 % | WEIGHT: 140.13 LBS

## 2023-11-09 DIAGNOSIS — I77.1 STRICTURE OF ARTERY: Primary | ICD-10-CM

## 2023-11-09 DIAGNOSIS — E78.2 MIXED HYPERLIPIDEMIA: ICD-10-CM

## 2023-11-09 PROCEDURE — 99214 OFFICE O/P EST MOD 30 MIN: CPT | Mod: S$GLB,,, | Performed by: INTERNAL MEDICINE

## 2023-11-09 PROCEDURE — 1159F MED LIST DOCD IN RCRD: CPT | Mod: CPTII,S$GLB,, | Performed by: INTERNAL MEDICINE

## 2023-11-09 PROCEDURE — 1126F PR PAIN SEVERITY QUANTIFIED, NO PAIN PRESENT: ICD-10-PCS | Mod: CPTII,S$GLB,, | Performed by: INTERNAL MEDICINE

## 2023-11-09 PROCEDURE — 3074F PR MOST RECENT SYSTOLIC BLOOD PRESSURE < 130 MM HG: ICD-10-PCS | Mod: CPTII,S$GLB,, | Performed by: INTERNAL MEDICINE

## 2023-11-09 PROCEDURE — 3074F SYST BP LT 130 MM HG: CPT | Mod: CPTII,S$GLB,, | Performed by: INTERNAL MEDICINE

## 2023-11-09 PROCEDURE — 3078F DIAST BP <80 MM HG: CPT | Mod: CPTII,S$GLB,, | Performed by: INTERNAL MEDICINE

## 2023-11-09 PROCEDURE — 93010 ELECTROCARDIOGRAM REPORT: CPT | Mod: S$GLB,,, | Performed by: INTERNAL MEDICINE

## 2023-11-09 PROCEDURE — 99999 PR PBB SHADOW E&M-EST. PATIENT-LVL II: ICD-10-PCS | Mod: PBBFAC,,, | Performed by: INTERNAL MEDICINE

## 2023-11-09 PROCEDURE — 1126F AMNT PAIN NOTED NONE PRSNT: CPT | Mod: CPTII,S$GLB,, | Performed by: INTERNAL MEDICINE

## 2023-11-09 PROCEDURE — 93010 EKG 12-LEAD: ICD-10-PCS | Mod: S$GLB,,, | Performed by: INTERNAL MEDICINE

## 2023-11-09 PROCEDURE — 99999 PR PBB SHADOW E&M-EST. PATIENT-LVL II: CPT | Mod: PBBFAC,,, | Performed by: INTERNAL MEDICINE

## 2023-11-09 PROCEDURE — 1159F PR MEDICATION LIST DOCUMENTED IN MEDICAL RECORD: ICD-10-PCS | Mod: CPTII,S$GLB,, | Performed by: INTERNAL MEDICINE

## 2023-11-09 PROCEDURE — 99214 PR OFFICE/OUTPT VISIT, EST, LEVL IV, 30-39 MIN: ICD-10-PCS | Mod: S$GLB,,, | Performed by: INTERNAL MEDICINE

## 2023-11-09 PROCEDURE — 93005 ELECTROCARDIOGRAM TRACING: CPT

## 2023-11-09 PROCEDURE — 3078F PR MOST RECENT DIASTOLIC BLOOD PRESSURE < 80 MM HG: ICD-10-PCS | Mod: CPTII,S$GLB,, | Performed by: INTERNAL MEDICINE

## 2023-11-09 NOTE — PROGRESS NOTES
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  She denies any complaints.  She denies any angina, dyspnea exertion, palpitation or syncope.      Medications  Current Outpatient Medications   Medication Sig Dispense Refill    ergocalciferol (ERGOCALCIFEROL) 50,000 unit Cap Take 50,000 Units by mouth every 7 days.      estradioL (ESTRACE) 0.01 % (0.1 mg/gram) vaginal cream Place 1 g vaginally twice a week. (Patient not taking: Reported on 11/9/2023) 42.5 g 11    rosuvastatin (CRESTOR) 10 MG tablet Take 10 mg by mouth.       No current facility-administered medications for this visit.      Prior to Admission medications    Medication Sig Start Date End Date Taking? Authorizing Provider   atorvastatin (LIPITOR) 20 MG tablet Take 20 mg by mouth once daily.    Provider, Historical   ergocalciferol (ERGOCALCIFEROL) 50,000 unit Cap Take 50,000 Units by mouth every 7 days. 8/19/22   Provider, Historical   estradioL (ESTRACE) 0.01 % (0.1 mg/gram) vaginal cream Place 1 g vaginally twice a week.  Patient not taking: Reported on 11/9/2023 4/27/23 4/26/24  Shruti Michelle MD   rosuvastatin (CRESTOR) 10 MG tablet Take 10 mg by mouth. 8/22/22   Provider, Historical         History  Past Medical History:   Diagnosis Date    Arthritis      Past Surgical History:   Procedure Laterality Date    CYST REMOVAL Left 2012    foot    JOINT REPLACEMENT      THYROIDECTOMY  2005    THYROIDECTOMY, PARTIAL  1980    TOTAL KNEE ARTHROPLASTY Right 9/30/2019    Procedure: ARTHROPLASTY, KNEE, TOTAL;  Surgeon: [facility] L. [facility] Jr., MD;  Location: [facility];  Service: Orthopedics;  Laterality: Right;     Social History      Socioeconomic History    Marital status: Single   Tobacco Use    Smoking status: Never    Smokeless tobacco: Never   Substance and Sexual Activity    Alcohol use: No    Drug use: Never    Sexual activity: Never         Allergies  Review of patient's allergies indicates:  No Known Allergies      Review of Systems   Review of Systems   Constitutional: Negative for decreased appetite, fever and weight loss.   HENT:  Negative for congestion and nosebleeds.    Eyes:  Negative for double vision, vision loss in left eye, vision loss in right eye and visual disturbance.   Cardiovascular:  Negative for chest pain, claudication, cyanosis, dyspnea on exertion, irregular heartbeat, leg swelling, near-syncope, orthopnea, palpitations, paroxysmal nocturnal dyspnea and syncope.   Respiratory:  Negative for cough, hemoptysis, shortness of breath, sleep disturbances due to breathing, snoring, sputum production and wheezing.    Endocrine: Negative for cold intolerance and heat intolerance.   Skin:  Negative for nail changes and rash.   Musculoskeletal:  Negative for joint pain, muscle cramps, muscle weakness and myalgias.   Gastrointestinal:  Negative for change in bowel habit, heartburn, hematemesis, hematochezia, hemorrhoids and melena.   Neurological:  Negative for dizziness, focal weakness and headaches.         Physical Exam  Wt Readings from Last 1 Encounters:   11/09/23 63.5 kg (140 lb 1.6 oz)     BP Readings from Last 3 Encounters:   11/09/23 122/64   01/18/23 121/73   01/09/23 131/72     Pulse Readings from Last 1 Encounters:   11/09/23 66     Body mass index is 25.62 kg/m².    Physical Exam  Vitals reviewed.   Constitutional:       Appearance: She is well-developed.   HENT:      Head: Atraumatic.   Eyes:      General: No scleral icterus.  Neck:      Vascular: Normal carotid pulses. No carotid bruit, hepatojugular reflux or JVD.   Cardiovascular:      Rate and Rhythm: Normal rate and regular rhythm.      Chest Wall: PMI  is not displaced.      Pulses: Intact distal pulses.           Carotid pulses are 2+ on the right side and 2+ on the left side.       Radial pulses are 2+ on the right side and 2+ on the left side.        Dorsalis pedis pulses are 2+ on the right side and 2+ on the left side.      Heart sounds: Normal heart sounds, S1 normal and S2 normal. No murmur heard.     No friction rub.   Pulmonary:      Effort: Pulmonary effort is normal. No respiratory distress.      Breath sounds: Normal breath sounds. No stridor. No wheezing or rales.   Chest:      Chest wall: No tenderness.   Abdominal:      General: Bowel sounds are normal.      Palpations: Abdomen is soft.   Musculoskeletal:      Cervical back: Neck supple. No edema.   Skin:     General: Skin is warm and dry.      Nails: There is no clubbing.   Neurological:      Mental Status: She is alert and oriented to person, place, and time.   Psychiatric:         Behavior: Behavior normal.         Thought Content: Thought content normal.             Assessment  1. Stricture of artery  Stable    2. Mixed hyperlipidemia  Stable on statin        Plan and Discussion  Discussed her EKG showed normal sinus rhythm rate of 60.  Recommend to continue current medications.    Follow Up  1 year      Martín Correa MD, F.A.C.C, F.S.C.A.I.        30 minutes were spent in chart review, documentation and review of results, and evaluation, treatment, and counseling of patient on the same day of service.    Disclaimer: This document was created using voice recognition software ([employer]). Although it may be edited, this document may contain errors related to incorrect recognition of the spoken word. Please call the physician if clarification is needed.

## 2024-02-29 ENCOUNTER — OFFICE VISIT (OUTPATIENT)
Dept: ORTHOPEDICS | Facility: CLINIC | Age: 79
End: 2024-02-29
Payer: MEDICARE

## 2024-02-29 VITALS — BODY MASS INDEX: 26.51 KG/M2 | HEIGHT: 62 IN | WEIGHT: 144.06 LBS

## 2024-02-29 DIAGNOSIS — M17.12 PRIMARY OSTEOARTHRITIS OF LEFT KNEE: Primary | ICD-10-CM

## 2024-02-29 PROCEDURE — 20610 DRAIN/INJ JOINT/BURSA W/O US: CPT | Mod: LT,S$GLB,, | Performed by: PHYSICIAN ASSISTANT

## 2024-02-29 PROCEDURE — 99499 UNLISTED E&M SERVICE: CPT | Mod: S$GLB,,, | Performed by: PHYSICIAN ASSISTANT

## 2024-02-29 PROCEDURE — 99999 PR PBB SHADOW E&M-EST. PATIENT-LVL II: CPT | Mod: PBBFAC,,, | Performed by: PHYSICIAN ASSISTANT

## 2024-02-29 RX ORDER — TRIAMCINOLONE ACETONIDE 40 MG/ML
40 INJECTION, SUSPENSION INTRA-ARTICULAR; INTRAMUSCULAR
Status: DISCONTINUED | OUTPATIENT
Start: 2024-02-29 | End: 2024-02-29 | Stop reason: HOSPADM

## 2024-02-29 RX ORDER — LIDOCAINE HYDROCHLORIDE 10 MG/ML
2 INJECTION INFILTRATION; PERINEURAL
Status: DISCONTINUED | OUTPATIENT
Start: 2024-02-29 | End: 2024-02-29 | Stop reason: HOSPADM

## 2024-02-29 RX ADMIN — LIDOCAINE HYDROCHLORIDE 2 ML: 10 INJECTION INFILTRATION; PERINEURAL at 08:02

## 2024-02-29 RX ADMIN — TRIAMCINOLONE ACETONIDE 40 MG: 40 INJECTION, SUSPENSION INTRA-ARTICULAR; INTRAMUSCULAR at 08:02

## 2024-02-29 NOTE — PROGRESS NOTES
Patient ID: Celestine Diallo is a 78 y.o. female.    Chief Complaint: left knee pain      HISTORY:  Celestine Diallo is a 78 y.o. female who returns to our clinic for follow up of left knee pain.     She would like another injection.  She is leaving for a trip next month and she has good relief with CSI.  Her las injection was 4 months ago.        PMH/PSH/FamHx/SocHx:    Unchanged from prior visit.    ROS:  Constitution: Negative for chills, fever and weakness.   Respiratory: Negative for cough and shortness of breath.   Musculoskeletal: Positive for left knee pain  Psychiatric/Behavioral: The patient is not nervous/anxious.       PHYSICAL EXAM:   Left knee  Skin intact  No warmth or effusion  ROM 0-120  No TTP    IMAGING: Previous X-rays of the left knee, personally reviewed by me, demonstrate severe DJD, lateral joint space narrowing, osteophyte formation.      ASSESSMENT/PLAN:    Diagnoses and all orders for this visit:    Primary osteoarthritis of left knee  -     Large Joint Aspiration/Injection: L knee      - Left knee CSI performed today  - Recommend rest, ice as needed  - Follow up as needed

## 2024-02-29 NOTE — PROCEDURES
Large Joint Aspiration/Injection: L knee    Date/Time: 2/29/2024 8:30 AM    Performed by: Mirlande Matute PA-C  Authorized by: Mirlande Matute PA-C    Consent Done?:  Yes (Verbal)  Indications:  Pain  Timeout: prior to procedure the correct patient, procedure, and site was verified    Prep: patient was prepped and draped in usual sterile fashion    Local anesthetic:  Topical anesthetic    Details:  Needle Size:  22 G  Approach:  Anterolateral  Location:  Knee  Site:  L knee  Medications:  40 mg triamcinolone acetonide 40 mg/mL; 2 mL LIDOcaine HCL 10 mg/ml (1%) 10 mg/mL (1 %)  Patient tolerance:  Patient tolerated the procedure well with no immediate complications

## 2024-10-11 ENCOUNTER — TELEPHONE (OUTPATIENT)
Dept: ORTHOPEDICS | Facility: CLINIC | Age: 79
End: 2024-10-11
Payer: MEDICARE

## 2024-10-11 NOTE — TELEPHONE ENCOUNTER
I spoke with pt,scheduled appointment. Pt,verbalized understanding      ----- Message from Margo sent at 10/11/2024  7:53 AM CDT -----    Nature of Call: requesting left knee injection    Best Call Back Number: [phone]     Additional Information:  Celestine Diallo calling regarding Appointment Access for requesting to have injection of the left knee, please call back to schedule PT

## 2024-10-15 ENCOUNTER — TELEPHONE (OUTPATIENT)
Dept: CARDIOLOGY | Facility: CLINIC | Age: 79
End: 2024-10-15
Payer: MEDICARE

## 2024-10-15 NOTE — TELEPHONE ENCOUNTER
Spoke to pt. Scheduled follow up visit with Dr. Correa on 11/1 at 8:40 am at [facility]. Pt verbalized understanding.

## 2024-10-15 NOTE — TELEPHONE ENCOUNTER
----- Message from Tarik sent at 10/15/2024  9:28 AM CDT -----  Regarding: self  Type: Patient Call Back    Who called:self    What is the request in detail:pt is calling to get an earlier appt due to her annual check up for her heart. Would like a callback     Can the clinic reply by [facility]?no    Would the patient rather a call back or a response via [facility]? callback    Best call back number:[phone]    Additional Information:

## 2024-10-21 ENCOUNTER — OFFICE VISIT (OUTPATIENT)
Dept: ORTHOPEDICS | Facility: CLINIC | Age: 79
End: 2024-10-21
Payer: MEDICARE

## 2024-10-21 ENCOUNTER — HOSPITAL ENCOUNTER (OUTPATIENT)
Dept: RADIOLOGY | Facility: HOSPITAL | Age: 79
Discharge: HOME | End: 2024-10-21
Attending: PHYSICIAN ASSISTANT
Payer: MEDICARE

## 2024-10-21 DIAGNOSIS — M25.561 RIGHT KNEE PAIN, UNSPECIFIED CHRONICITY: ICD-10-CM

## 2024-10-21 DIAGNOSIS — M17.12 PRIMARY OSTEOARTHRITIS OF LEFT KNEE: Primary | ICD-10-CM

## 2024-10-21 PROCEDURE — 20610 DRAIN/INJ JOINT/BURSA W/O US: CPT | Mod: LT,S$GLB,, | Performed by: PHYSICIAN ASSISTANT

## 2024-10-21 PROCEDURE — 73562 X-RAY EXAM OF KNEE 3: CPT | Mod: 26,LT,, | Performed by: RADIOLOGY

## 2024-10-21 PROCEDURE — 99999 PR PBB SHADOW E&M-EST. PATIENT-LVL II: CPT | Mod: PBBFAC,,, | Performed by: PHYSICIAN ASSISTANT

## 2024-10-21 PROCEDURE — 73560 X-RAY EXAM OF KNEE 1 OR 2: CPT | Mod: 26,59,RT, | Performed by: RADIOLOGY

## 2024-10-21 PROCEDURE — 99499 UNLISTED E&M SERVICE: CPT | Mod: S$GLB,,, | Performed by: PHYSICIAN ASSISTANT

## 2024-10-21 PROCEDURE — 73560 X-RAY EXAM OF KNEE 1 OR 2: CPT | Mod: TC,RT

## 2024-10-21 PROCEDURE — 73562 X-RAY EXAM OF KNEE 3: CPT | Mod: TC,LT

## 2024-10-21 RX ORDER — LIDOCAINE HYDROCHLORIDE 10 MG/ML
2 INJECTION, SOLUTION INFILTRATION; PERINEURAL
Status: DISCONTINUED | OUTPATIENT
Start: 2024-10-21 | End: 2024-10-21 | Stop reason: HOSPADM

## 2024-10-21 RX ORDER — TRIAMCINOLONE ACETONIDE 40 MG/ML
40 INJECTION, SUSPENSION INTRA-ARTICULAR; INTRAMUSCULAR
Status: DISCONTINUED | OUTPATIENT
Start: 2024-10-21 | End: 2024-10-21 | Stop reason: HOSPADM

## 2024-10-21 RX ADMIN — LIDOCAINE HYDROCHLORIDE 2 ML: 10 INJECTION, SOLUTION INFILTRATION; PERINEURAL at 08:10

## 2024-10-21 RX ADMIN — TRIAMCINOLONE ACETONIDE 40 MG: 40 INJECTION, SUSPENSION INTRA-ARTICULAR; INTRAMUSCULAR at 08:10

## 2024-10-21 NOTE — PROGRESS NOTES
Patient ID: Celestine Diallo is a 78 y.o. female.    Chief Complaint: left knee pain    HISTORY:  Celestine Diallo is a 78 y.o. female who returns to me today for follow up of left knee pain.  She was last seen by me 2/29/2024.  Today she is doing well, but notes pain has recently worsened.  She would liek to repeat an injection today.       PMH/PSH/FamHx/SocHx:    Unchanged from prior visit.    ROS:  Constitution: Negative for chills, fever and weakness.   Respiratory: Negative for cough and shortness of breath.   Musculoskeletal: Positive for left knee pain  Psychiatric/Behavioral: The patient is not nervous/anxious.       PHYSICAL EXAM:   Left knee  Skin intact  No warmth or effusion  ROM 0-130  Stable to testing  No TTP    IMAGING: X-rays of the left knee, personally reviewed by me, demonstrate severe degenerative changes with joint space narrowing , osteophyte formation and subchondral sclerosis.  No fracture or dislocation.     ASSESSMENT/PLAN:    Celestine was seen today for pain, injections, knee pain and injections.    Diagnoses and all orders for this visit:    Primary osteoarthritis of left knee  -     X-ray Knee Ortho Left; Future      - Left knee CSI performed today  - Rest, ice as needed  - Follow up if symptoms worsen or fail to improve    If there are any questions prior to this, the patient was instructed to contact the office.

## 2024-10-21 NOTE — PROCEDURES
Large Joint Aspiration/Injection: L knee    Date/Time: 10/21/2024 8:30 AM    Performed by: Mirlande Matute PA-C  Authorized by: Mirlande Matute PA-C    Consent Done?:  Yes (Verbal)  Indications:  Pain  Timeout: prior to procedure the correct patient, procedure, and site was verified    Prep: patient was prepped and draped in usual sterile fashion    Local anesthetic:  Topical anesthetic    Details:  Needle Size:  22 G  Approach:  Anterolateral  Location:  Knee  Site:  L knee  Medications:  40 mg triamcinolone acetonide 40 mg/mL; 2 mL LIDOcaine HCL 10 mg/ml (1%) 10 mg/mL (1 %)  Patient tolerance:  Patient tolerated the procedure well with no immediate complications

## 2024-11-01 ENCOUNTER — OFFICE VISIT (OUTPATIENT)
Dept: CARDIOLOGY | Facility: CLINIC | Age: 79
End: 2024-11-01
Payer: MEDICARE

## 2024-11-01 VITALS
HEART RATE: 69 BPM | OXYGEN SATURATION: 98 % | SYSTOLIC BLOOD PRESSURE: 110 MMHG | WEIGHT: 139.5 LBS | DIASTOLIC BLOOD PRESSURE: 60 MMHG | BODY MASS INDEX: 25.51 KG/M2

## 2024-11-01 DIAGNOSIS — I83.891 VARICOSE VEINS OF LEG WITH SWELLING, RIGHT: ICD-10-CM

## 2024-11-01 DIAGNOSIS — I77.1 STRICTURE OF ARTERY: ICD-10-CM

## 2024-11-01 DIAGNOSIS — E78.2 MIXED HYPERLIPIDEMIA: Primary | ICD-10-CM

## 2024-11-01 LAB
OHS QRS DURATION: 92 MS
OHS QTC CALCULATION: 406 MS

## 2024-11-01 PROCEDURE — 93005 ELECTROCARDIOGRAM TRACING: CPT

## 2024-11-01 PROCEDURE — 99999 PR PBB SHADOW E&M-EST. PATIENT-LVL III: CPT | Mod: PBBFAC,,, | Performed by: INTERNAL MEDICINE

## 2025-03-24 ENCOUNTER — TELEPHONE (OUTPATIENT)
Dept: ORTHOPEDICS | Facility: CLINIC | Age: 80
End: 2025-03-24
Payer: MEDICARE

## 2025-03-24 NOTE — TELEPHONE ENCOUNTER
----- Message from Chad Crespo sent at 3/21/2025  2:58 PM CDT -----  Regarding: FW: appt  Contact: [phone]    ----- Message -----  From: Yulisa Liao  Sent: 3/21/2025   2:08 PM CDT  To: [facility] Staff  Subject: appt                                             .Type:  Needs Medical AdviceWho Called: pt Symptoms (please be specific): asking for injection type appt for left knee. Pt stated she is open to seeing a different provider if necessary  Would the patient rather a call back or a response via [facility]? Call Best Call Back Number: [phone] Information: n/a

## 2025-03-25 ENCOUNTER — TELEPHONE (OUTPATIENT)
Dept: ORTHOPEDICS | Facility: CLINIC | Age: 80
End: 2025-03-25
Payer: MEDICARE

## 2025-03-25 NOTE — TELEPHONE ENCOUNTER
Called the pt 3x and was not able to leave a voicemail. I went ahead and scheduled pt appointment since she is active on the portal. Will continue to reach out.            ----- Message from Chad Nunes sent at 3/25/2025 10:22 AM CDT -----    ----- Message -----  From: Margo Moody  Sent: 3/25/2025   9:58 AM CDT  To: [facility] Orth[facility] Clinical Support    Name of Caller: Nature of Call: requesting a injection appt Best Call Back Number: [phone] Additional Information:[facility] FRANKIE calling regarding Appointment Access for requesting to have a f/u left knee injection. PT was last seen by ABEL Jurado and is open to see whomever is the soonest appt. Please call the PT to assist with scheduling

## 2025-03-27 ENCOUNTER — OFFICE VISIT (OUTPATIENT)
Dept: ORTHOPEDICS | Facility: CLINIC | Age: 80
End: 2025-03-27
Payer: MEDICARE

## 2025-03-27 DIAGNOSIS — M17.12 PRIMARY OSTEOARTHRITIS OF LEFT KNEE: Primary | ICD-10-CM

## 2025-03-27 PROCEDURE — 1160F RVW MEDS BY RX/DR IN RCRD: CPT | Mod: CPTII,S$GLB,, | Performed by: PHYSICIAN ASSISTANT

## 2025-03-27 PROCEDURE — 99999 PR PBB SHADOW E&M-EST. PATIENT-LVL I: CPT | Mod: PBBFAC,,, | Performed by: PHYSICIAN ASSISTANT

## 2025-03-27 PROCEDURE — 1125F AMNT PAIN NOTED PAIN PRSNT: CPT | Mod: CPTII,S$GLB,, | Performed by: PHYSICIAN ASSISTANT

## 2025-03-27 PROCEDURE — 3288F FALL RISK ASSESSMENT DOCD: CPT | Mod: CPTII,S$GLB,, | Performed by: PHYSICIAN ASSISTANT

## 2025-03-27 PROCEDURE — 1101F PT FALLS ASSESS-DOCD LE1/YR: CPT | Mod: CPTII,S$GLB,, | Performed by: PHYSICIAN ASSISTANT

## 2025-03-27 PROCEDURE — 20610 DRAIN/INJ JOINT/BURSA W/O US: CPT | Mod: LT,S$GLB,, | Performed by: PHYSICIAN ASSISTANT

## 2025-03-27 PROCEDURE — 1159F MED LIST DOCD IN RCRD: CPT | Mod: CPTII,S$GLB,, | Performed by: PHYSICIAN ASSISTANT

## 2025-03-27 PROCEDURE — 99213 OFFICE O/P EST LOW 20 MIN: CPT | Mod: 25,S$GLB,, | Performed by: PHYSICIAN ASSISTANT

## 2025-03-27 RX ORDER — LIDOCAINE HYDROCHLORIDE 10 MG/ML
2 INJECTION, SOLUTION INFILTRATION; PERINEURAL
Status: DISCONTINUED | OUTPATIENT
Start: 2025-03-27 | End: 2025-03-27 | Stop reason: HOSPADM

## 2025-03-27 RX ORDER — TRIAMCINOLONE ACETONIDE 40 MG/ML
40 INJECTION, SUSPENSION INTRA-ARTICULAR; INTRAMUSCULAR
Status: DISCONTINUED | OUTPATIENT
Start: 2025-03-27 | End: 2025-03-27 | Stop reason: HOSPADM

## 2025-03-27 RX ADMIN — LIDOCAINE HYDROCHLORIDE 2 ML: 10 INJECTION, SOLUTION INFILTRATION; PERINEURAL at 01:03

## 2025-03-27 RX ADMIN — TRIAMCINOLONE ACETONIDE 40 MG: 40 INJECTION, SUSPENSION INTRA-ARTICULAR; INTRAMUSCULAR at 01:03

## 2025-03-27 NOTE — PROCEDURES
Large Joint Aspiration/Injection: L knee    Date/Time: 3/27/2025 1:00 PM    Performed by: Cindy Arenas PA-C  Authorized by: Cindy Arenas PA-C    Consent Done?:  Yes (Verbal)  Indications:  Arthritis  Prep: patient was prepped and draped in usual sterile fashion      Local anesthesia used?: Yes    Local anesthetic:  Topical anesthetic    Details:  Needle Size:  22 G  Ultrasonic Guidance for needle placement?: No    Approach:  Lateral  Location:  Knee  Site:  L knee  Medications:  2 mL LIDOcaine HCL 10 mg/ml (1%) 10 mg/mL (1 %); 40 mg triamcinolone acetonide 40 mg/mL  Patient tolerance:  Patient tolerated the procedure well with no immediate complications

## 2025-03-27 NOTE — PROGRESS NOTES
History of Present Illness    CHIEF COMPLAINT:  Patient presents today for recurrent L knee pain.    KNEE PAIN AND MOBILITY:  She reports recurrent L knee pain for OA about 5-6 months ago. No new injury, trauma, or falls. She requires CSI every 3-4 months when pain returns. She experiences morning knee pain that occasionally requires wheelchair use when unable to stand up from bed. She uses a cane during periods of knee pain but does not require assistive devices when asymptomatic. Today her pain is controlled, but it has been bothering for the past several months. She would like CSI today. Her last CSI was on 10/21/24.            Medications: I have reviewed medication list in the chart at the time of this encounter.     Review of patient's allergies indicates:  No Known Allergies   Past Medical History:   Diagnosis Date    Arthritis      Past Surgical History:   Procedure Laterality Date    CYST REMOVAL Left 2012    foot    JOINT REPLACEMENT      THYROIDECTOMY  2005    THYROIDECTOMY, PARTIAL  1980    TOTAL KNEE ARTHROPLASTY Right 9/30/2019    Procedure: ARTHROPLASTY, KNEE, TOTAL;  Surgeon: [facility]., MD;  Location: [facility];  Service: Orthopedics;  Laterality: Right;       Review of Systems   Musculoskeletal:  Positive for joint pain. Negative for falls.   Neurological:  Negative for tingling and weakness.       Physical Exam  Musculoskeletal:      Left knee: No swelling, deformity, effusion, erythema, ecchymosis, lacerations or bony tenderness. Normal range of motion. Tenderness present. Normal patellar mobility.            Imaging:  I have independently interpreted and reviewed past left knee x-ray with patient.    1. Primary osteoarthritis of left knee       Assessment & Plan    IMPRESSION:  - Knee pain recurred 5-6 months ago.  - Imaging from October last year shows significant OA.  - Joint injection appropriate for symptom management based on reported relief from previous treatments. See procedure  note.    FOLLOW UPS:  - Follow up with me PRN.        Orders Placed This Encounter    Large Joint Aspiration/Injection: L knee        Future Appointments   Date Time Provider Department Center   11/6/2025  8:20 AM Martín Correa MD [id number] ELISE DicksonC  Orthopedic Surgery  [facility]

## 2025-07-31 ENCOUNTER — TELEPHONE (OUTPATIENT)
Dept: CARDIOLOGY | Facility: CLINIC | Age: 80
End: 2025-07-31
Payer: MEDICARE

## 2025-07-31 NOTE — TELEPHONE ENCOUNTER
Spoke to pt. Scheduled appt for pt to see Dr. Correa on August 8 at 2pm. Pt verbalized understanding.

## 2025-08-08 ENCOUNTER — OFFICE VISIT (OUTPATIENT)
Dept: CARDIOLOGY | Facility: CLINIC | Age: 80
End: 2025-08-08
Payer: MEDICARE

## 2025-08-08 VITALS — WEIGHT: 136.69 LBS | BODY MASS INDEX: 25 KG/M2 | SYSTOLIC BLOOD PRESSURE: 106 MMHG | DIASTOLIC BLOOD PRESSURE: 66 MMHG

## 2025-08-08 DIAGNOSIS — E78.2 MIXED HYPERLIPIDEMIA: Primary | ICD-10-CM

## 2025-08-08 LAB
OHS QRS DURATION: 88 MS
OHS QTC CALCULATION: 367 MS

## 2025-08-08 PROCEDURE — 99999 PR PBB SHADOW E&M-EST. PATIENT-LVL III: CPT | Mod: PBBFAC,,, | Performed by: INTERNAL MEDICINE

## 2025-08-08 NOTE — PROGRESS NOTES
Cardiology    8/8/2025  2:14 PM    Problem list  Problem List[1]    CC:  Follow-up    HPI:  She is here for annual follow-up.  She has no cardiac complaints.  She denies any angina, palpitation, dyspnea exertion, syncope.    Medications  Current Medications[2]   Prior to Admission medications    Medication Sig Start Date End Date Taking? Authorizing Provider   ergocalciferol (ERGOCALCIFEROL) 50,000 unit Cap Take 50,000 Units by mouth every 7 days. 8/19/22  Yes Provider, Historical   rosuvastatin (CRESTOR) 10 MG tablet Take 10 mg by mouth. 8/22/22  Yes Provider, Historical   estradioL (ESTRACE) 0.01 % (0.1 mg/gram) vaginal cream Place 1 g vaginally twice a week. 4/27/23 4/26/24  Shruti Michelle MD         History  Past Medical History:   Diagnosis Date    Arthritis      Past Surgical History:   Procedure Laterality Date    CYST REMOVAL Left 2012    foot    JOINT REPLACEMENT      THYROIDECTOMY  2005    THYROIDECTOMY, PARTIAL  1980    TOTAL KNEE ARTHROPLASTY Right 9/30/2019    Procedure: ARTHROPLASTY, KNEE, TOTAL;  Surgeon: John L. Ochsner Jr., MD;  Location: [facility];  Service: Orthopedics;  Laterality: Right;     Social History[3]      Allergies  Review of patient's allergies indicates:  No Known Allergies      Review of Systems   Review of Systems   Constitutional: Negative for decreased appetite, fever and weight loss.   HENT:  Negative for congestion and nosebleeds.    Eyes:  Negative for double vision, vision loss in left eye, vision loss in right eye and visual disturbance.   Cardiovascular:  Negative for chest pain, claudication, cyanosis, dyspnea on exertion, irregular heartbeat, leg swelling, near-syncope, orthopnea, palpitations, paroxysmal nocturnal dyspnea and syncope.   Respiratory:  Negative for cough, hemoptysis, shortness of breath, sleep disturbances due to breathing, snoring, sputum production and wheezing.    Endocrine: Negative for cold intolerance and heat intolerance.   Skin:  Negative  for nail changes and rash.   Musculoskeletal:  Negative for joint pain, muscle cramps, muscle weakness and myalgias.   Gastrointestinal:  Negative for change in bowel habit, heartburn, hematemesis, hematochezia, hemorrhoids and melena.   Neurological:  Negative for dizziness, focal weakness and headaches.         Physical Exam  Wt Readings from Last 1 Encounters:   08/08/25 62 kg (136 lb 11.2 oz)     BP Readings from Last 3 Encounters:   08/08/25 106/66   11/01/24 110/60   11/09/23 122/64     Pulse Readings from Last 1 Encounters:   08/08/25 (P) 79     Body mass index is 25 kg/m².    Physical Exam  Vitals reviewed.   Constitutional:       Appearance: She is well-developed.   HENT:      Head: Atraumatic.   Eyes:      General: No scleral icterus.  Neck:      Vascular: Normal carotid pulses. No carotid bruit, hepatojugular reflux or JVD.   Cardiovascular:      Rate and Rhythm: Normal rate and regular rhythm.      Chest Wall: PMI is not displaced.      Pulses: Intact distal pulses.           Carotid pulses are 2+ on the right side and 2+ on the left side.       Radial pulses are 2+ on the right side and 2+ on the left side.        Dorsalis pedis pulses are 2+ on the right side and 2+ on the left side.      Heart sounds: Normal heart sounds, S1 normal and S2 normal. No murmur heard.     No friction rub.   Pulmonary:      Effort: Pulmonary effort is normal. No respiratory distress.      Breath sounds: Normal breath sounds. No stridor. No wheezing or rales.   Chest:      Chest wall: No tenderness.   Abdominal:      General: Bowel sounds are normal.      Palpations: Abdomen is soft.   Musculoskeletal:      Cervical back: Neck supple. No edema.   Skin:     General: Skin is warm and dry.      Nails: There is no clubbing.   Neurological:      Mental Status: She is alert and oriented to person, place, and time.   Psychiatric:         Behavior: Behavior normal.         Thought Content: Thought content normal.              Assessment  1. Mixed hyperlipidemia (Primary)  Stable  - EKG 12-lead        Plan and Discussion  Discussed EKG today which showed normal sinus rhythm rate of 67.  She has no cardiac symptoms.  Continue current heart healthy diet and exercise regimen.    Follow Up  One year      Martín Correa MD, F.A.C.C, F.S.C.A.I.      Total professional time spent for the encounter: 20 minutes  Time was spent preparing to see the patient, reviewing results of prior testing, obtaining and/or reviewing separately obtained history, performing a medically appropriate examination and interview, counseling and educating the patient/family, ordering medications/tests/procedures, referring and communicating with other health care professionals, documenting clinical information in the electronic health record, and independently interpreting results.    Disclaimer: This document was created using voice recognition software ([employer]). Although it may be edited, this document may contain errors related to incorrect recognition of the spoken word. Please call the physician if clarification is needed.          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      rosuvastatin (CRESTOR) 10 MG tablet Take 10 mg by mouth.      estradioL (ESTRACE) 0.01 % (0.1 mg/gram) vaginal cream Place 1 g vaginally twice a week. 42.5 g 11     No current facility-administered medications for this visit.   [3]   Social History  Socioeconomic History    Marital status: Single   Tobacco Use    Smoking status: Never    Smokeless tobacco:  Never   Substance and Sexual Activity    Alcohol use: No    Drug use: Never    Sexual activity: Never

## (undated) DEVICE — TOURNIQUET SB QC DP 34X4IN

## (undated) DEVICE — SEE MEDLINE ITEM 146345

## (undated) DEVICE — BOWL CEMENT

## (undated) DEVICE — GLOVE BIOGEL ORTHOPEDIC 7.5

## (undated) DEVICE — SEE ITEM #10979

## (undated) DEVICE — HOOD T-5 TEAR AWAY STERILE

## (undated) DEVICE — SEE MEDLINE ITEM 157117

## (undated) DEVICE — DRESSING AQUACEL AG ADV 3.5X12

## (undated) DEVICE — PUMP COLD THERAPY

## (undated) DEVICE — SCREW HEX HEAD 3.5X38MM
Type: IMPLANTABLE DEVICE | Site: KNEE | Status: NON-FUNCTIONAL
Removed: 2019-09-30

## (undated) DEVICE — ELECTRODE REM PLYHSV RETURN 9

## (undated) DEVICE — SUT VICRYL BR 1 GEN 27 CT-1

## (undated) DEVICE — STAPLER SKIN PROXIMATE WIDE

## (undated) DEVICE — SEE MEDLINE ITEM 152487

## (undated) DEVICE — PAD COLD THERAPY KNEE WRAP ON

## (undated) DEVICE — NDL 18GA X1 1/2 REG BEVEL

## (undated) DEVICE — KIT IRR SUCTION HND PIECE

## (undated) DEVICE — DRAPE STERI INSTRUMENT 1018

## (undated) DEVICE — BIT DRILL PIN TROCAR 3.2MM
Type: IMPLANTABLE DEVICE | Site: KNEE | Status: NON-FUNCTIONAL
Removed: 2019-09-30

## (undated) DEVICE — KIT TOTAL KNEE TKOFG

## (undated) DEVICE — SOL IRR NACL .9% 3000ML

## (undated) DEVICE — SET CEMENT (SCULP)

## (undated) DEVICE — BLADE RECIP RIBBED

## (undated) DEVICE — SEE MEDLINE ITEM 146270

## (undated) DEVICE — GAUZE SPONGE 4X4 12PLY

## (undated) DEVICE — TAPE SURG DURAPORE 2 X10YD

## (undated) DEVICE — SUT CTD VICRYL 0 UND BR CPX

## (undated) DEVICE — Device

## (undated) DEVICE — PAD CAST SPECIALIST STRL 6

## (undated) DEVICE — SEE MEDLINE ITEM 146298

## (undated) DEVICE — SEE MEDLINE ITEM 152515

## (undated) DEVICE — BANDAGE ACE ELASTIC 6"

## (undated) DEVICE — SYR ONLY LUER LOCK 20CC

## (undated) DEVICE — SYR 30CC LUER LOCK

## (undated) DEVICE — BANDAGE ESMARK 6X12

## (undated) DEVICE — SUT VICRYL 3-0 27 SH

## (undated) DEVICE — SCREW HEX HEAD
Type: IMPLANTABLE DEVICE | Site: KNEE | Status: NON-FUNCTIONAL
Removed: 2019-09-30